# Patient Record
Sex: FEMALE | Race: WHITE | NOT HISPANIC OR LATINO | URBAN - METROPOLITAN AREA
[De-identification: names, ages, dates, MRNs, and addresses within clinical notes are randomized per-mention and may not be internally consistent; named-entity substitution may affect disease eponyms.]

---

## 2017-01-14 ENCOUNTER — EMERGENCY (EMERGENCY)
Facility: HOSPITAL | Age: 82
LOS: 1 days | Discharge: HOME | End: 2017-01-14
Attending: EMERGENCY MEDICINE
Payer: MEDICARE

## 2017-01-14 VITALS
TEMPERATURE: 98 F | OXYGEN SATURATION: 99 % | HEART RATE: 85 BPM | WEIGHT: 164.02 LBS | DIASTOLIC BLOOD PRESSURE: 77 MMHG | RESPIRATION RATE: 16 BRPM | HEIGHT: 63 IN | SYSTOLIC BLOOD PRESSURE: 115 MMHG

## 2017-01-14 DIAGNOSIS — L50.9 URTICARIA, UNSPECIFIED: ICD-10-CM
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
## 2017-01-14 PROCEDURE — 99283 EMERGENCY DEPT VISIT LOW MDM: CPT

## 2017-01-14 RX ADMIN — Medication 40 MILLIGRAM(S): at 10:30

## 2017-01-14 NOTE — ED ADULT NURSE NOTE - OBJECTIVE STATEMENT
Presented to ED with complaints of "allergic reaction that happened 2 weeks ago." AA&Ox3. Breathing on room air. Noted with hives, redness and itching to neck and abdomen. Verbalized she "does not know what triggered allergy." Seen by MD.

## 2017-01-14 NOTE — ED PROVIDER NOTE - NS ED MD SCRIBE ATTENDING SCRIBE SECTIONS
VITAL SIGNS( Pullset)/PHYSICAL EXAM/DISPOSITION/REVIEW OF SYSTEMS/HIV/PAST MEDICAL/SURGICAL/SOCIAL HISTORY/HISTORY OF PRESENT ILLNESS

## 2017-01-14 NOTE — ED PROVIDER NOTE - CHPI ED SYMPTOMS NEG
no nausea/no throat itching/no shortness of breath/no difficulty breathing/no difficulty swallowing/no vomiting

## 2017-01-14 NOTE — ED PROVIDER NOTE - OBJECTIVE STATEMENT
82 y/o female with no PMHx presents to the ED for rash x 2 weeks. Pt reports itchy diffuse rash x 2 weeks, moves around. Pt was initially given bactrim, hydroxyzine and loratadine by PMD without improvement. Pt denies throat itchiness, nausea, vomiting, new exposures, allergies, or any other complaints.

## 2017-01-14 NOTE — ED PROVIDER NOTE - MEDICAL DECISION MAKING DETAILS
Pt with urticarial rash. Pt instructed to stop Abx and continue with anti-histamine treatment. Will provide short course of steroids and have pt f/u with PMD in 1-2 days and allergy specialist in 1 week.

## 2017-05-09 ENCOUNTER — INPATIENT (INPATIENT)
Facility: HOSPITAL | Age: 82
LOS: 1 days | Discharge: HOME HEALTH | End: 2017-05-11
Attending: HOSPITALIST | Admitting: HOSPITALIST
Payer: MEDICARE

## 2017-05-09 VITALS
OXYGEN SATURATION: 99 % | DIASTOLIC BLOOD PRESSURE: 61 MMHG | HEART RATE: 72 BPM | SYSTOLIC BLOOD PRESSURE: 131 MMHG | TEMPERATURE: 98 F | RESPIRATION RATE: 16 BRPM

## 2017-05-09 DIAGNOSIS — F03.91 UNSPECIFIED DEMENTIA WITH BEHAVIORAL DISTURBANCE: ICD-10-CM

## 2017-05-09 LAB
ALBUMIN SERPL ELPH-MCNC: 4.3 G/DL — SIGNIFICANT CHANGE UP (ref 3.3–5)
ALP SERPL-CCNC: 66 U/L — SIGNIFICANT CHANGE UP (ref 40–120)
ALT FLD-CCNC: 10 U/L — SIGNIFICANT CHANGE UP (ref 4–33)
APPEARANCE UR: CLEAR — SIGNIFICANT CHANGE UP
AST SERPL-CCNC: 17 U/L — SIGNIFICANT CHANGE UP (ref 4–32)
BASOPHILS # BLD AUTO: 0.09 K/UL — SIGNIFICANT CHANGE UP (ref 0–0.2)
BASOPHILS NFR BLD AUTO: 1 % — SIGNIFICANT CHANGE UP (ref 0–2)
BILIRUB SERPL-MCNC: 0.8 MG/DL — SIGNIFICANT CHANGE UP (ref 0.2–1.2)
BILIRUB UR-MCNC: NEGATIVE — SIGNIFICANT CHANGE UP
BLOOD UR QL VISUAL: NEGATIVE — SIGNIFICANT CHANGE UP
BUN SERPL-MCNC: 18 MG/DL — SIGNIFICANT CHANGE UP (ref 7–23)
CALCIUM SERPL-MCNC: 9.5 MG/DL — SIGNIFICANT CHANGE UP (ref 8.4–10.5)
CHLORIDE SERPL-SCNC: 102 MMOL/L — SIGNIFICANT CHANGE UP (ref 98–107)
CO2 SERPL-SCNC: 23 MMOL/L — SIGNIFICANT CHANGE UP (ref 22–31)
COLOR SPEC: YELLOW — SIGNIFICANT CHANGE UP
CREAT SERPL-MCNC: 0.85 MG/DL — SIGNIFICANT CHANGE UP (ref 0.5–1.3)
EOSINOPHIL # BLD AUTO: 0.45 K/UL — SIGNIFICANT CHANGE UP (ref 0–0.5)
EOSINOPHIL NFR BLD AUTO: 5.2 % — SIGNIFICANT CHANGE UP (ref 0–6)
GLUCOSE SERPL-MCNC: 106 MG/DL — HIGH (ref 70–99)
GLUCOSE UR-MCNC: NEGATIVE — SIGNIFICANT CHANGE UP
HCT VFR BLD CALC: 42.4 % — SIGNIFICANT CHANGE UP (ref 34.5–45)
HGB BLD-MCNC: 13.2 G/DL — SIGNIFICANT CHANGE UP (ref 11.5–15.5)
IMM GRANULOCYTES NFR BLD AUTO: 0.1 % — SIGNIFICANT CHANGE UP (ref 0–1.5)
KETONES UR-MCNC: NEGATIVE — SIGNIFICANT CHANGE UP
LEUKOCYTE ESTERASE UR-ACNC: HIGH
LYMPHOCYTES # BLD AUTO: 3.82 K/UL — HIGH (ref 1–3.3)
LYMPHOCYTES # BLD AUTO: 43.9 % — SIGNIFICANT CHANGE UP (ref 13–44)
MAGNESIUM SERPL-MCNC: 3.7 MG/DL — HIGH (ref 1.6–2.6)
MCHC RBC-ENTMCNC: 26.3 PG — LOW (ref 27–34)
MCHC RBC-ENTMCNC: 31.1 % — LOW (ref 32–36)
MCV RBC AUTO: 84.5 FL — SIGNIFICANT CHANGE UP (ref 80–100)
MONOCYTES # BLD AUTO: 0.55 K/UL — SIGNIFICANT CHANGE UP (ref 0–0.9)
MONOCYTES NFR BLD AUTO: 6.3 % — SIGNIFICANT CHANGE UP (ref 2–14)
MUCOUS THREADS # UR AUTO: SIGNIFICANT CHANGE UP
NEUTROPHILS # BLD AUTO: 3.78 K/UL — SIGNIFICANT CHANGE UP (ref 1.8–7.4)
NEUTROPHILS NFR BLD AUTO: 43.5 % — SIGNIFICANT CHANGE UP (ref 43–77)
NITRITE UR-MCNC: NEGATIVE — SIGNIFICANT CHANGE UP
NON-SQ EPI CELLS # UR AUTO: <1 — SIGNIFICANT CHANGE UP
PH UR: 6 — SIGNIFICANT CHANGE UP (ref 4.6–8)
PLATELET # BLD AUTO: 289 K/UL — SIGNIFICANT CHANGE UP (ref 150–400)
PMV BLD: 10.7 FL — SIGNIFICANT CHANGE UP (ref 7–13)
POTASSIUM SERPL-MCNC: 3.9 MMOL/L — SIGNIFICANT CHANGE UP (ref 3.5–5.3)
POTASSIUM SERPL-SCNC: 3.9 MMOL/L — SIGNIFICANT CHANGE UP (ref 3.5–5.3)
PROT SERPL-MCNC: 7.9 G/DL — SIGNIFICANT CHANGE UP (ref 6–8.3)
PROT UR-MCNC: 20 — SIGNIFICANT CHANGE UP
RBC # BLD: 5.02 M/UL — SIGNIFICANT CHANGE UP (ref 3.8–5.2)
RBC # FLD: 13.9 % — SIGNIFICANT CHANGE UP (ref 10.3–14.5)
RBC CASTS # UR COMP ASSIST: SIGNIFICANT CHANGE UP (ref 0–?)
SODIUM SERPL-SCNC: 142 MMOL/L — SIGNIFICANT CHANGE UP (ref 135–145)
SP GR SPEC: 1.02 — SIGNIFICANT CHANGE UP (ref 1–1.03)
SQUAMOUS # UR AUTO: SIGNIFICANT CHANGE UP
TSH SERPL-MCNC: 1.6 UIU/ML — SIGNIFICANT CHANGE UP (ref 0.27–4.2)
UROBILINOGEN FLD QL: NORMAL E.U. — SIGNIFICANT CHANGE UP (ref 0.1–0.2)
WBC # BLD: 8.7 K/UL — SIGNIFICANT CHANGE UP (ref 3.8–10.5)
WBC # FLD AUTO: 8.7 K/UL — SIGNIFICANT CHANGE UP (ref 3.8–10.5)
WBC UR QL: HIGH (ref 0–?)

## 2017-05-09 PROCEDURE — 99223 1ST HOSP IP/OBS HIGH 75: CPT

## 2017-05-09 PROCEDURE — 70450 CT HEAD/BRAIN W/O DYE: CPT | Mod: 26

## 2017-05-09 RX ORDER — HALOPERIDOL DECANOATE 100 MG/ML
2.5 INJECTION INTRAMUSCULAR ONCE
Qty: 0 | Refills: 0 | Status: COMPLETED | OUTPATIENT
Start: 2017-05-09 | End: 2017-05-09

## 2017-05-09 RX ADMIN — Medication 1 MILLIGRAM(S): at 19:20

## 2017-05-09 RX ADMIN — HALOPERIDOL DECANOATE 2.5 MILLIGRAM(S): 100 INJECTION INTRAMUSCULAR at 19:20

## 2017-05-09 NOTE — ED ADULT NURSE NOTE - OBJECTIVE STATEMENT
Pt received in spot 25. Alert and oriented x1. Ambulatory. Brought in by family for ams. Hx of dementia, as per family pt's dementia is worsening. Pt wandering the halls in apartment building and becomes agitate at times. Meds given as per MD orders. Skin intact.  Labs sent. IV placed. VSS.

## 2017-05-09 NOTE — ED PROVIDER NOTE - CHIEF COMPLAINT
The patient is a 81y Female complaining of The patient is a 81y Female bib family for agitation, worsening behavior.

## 2017-05-09 NOTE — ED PROVIDER NOTE - MEDICAL DECISION MAKING DETAILS
80 yo F, bib 4 daughters for evaluation of worsening dementia.  Been wandering off from house. Being found by neighbors wandering the streets.  Patient is risk to self and they cannot care for her any longer at home.  They are at wits end and want her admitted for placement.  Patient is not cooperative and attempted to elope several times, but with the help of family 82 yo F, bib 4 daughters for evaluation of worsening dementia.  Been wandering off from house. Being found by neighbors wandering the streets.  Patient is risk to self and they cannot care for her any longer at home.  They are at wits end and want her admitted for placement.  Patient is not cooperative and attempted to elope several times, but with the help of family able to get back into room.  Required 2.5mg Haldol + 1mg Ativan IM.  Physical exam unremarkable.  NCAT. PERRL. EOMI. Neck supple. CTA B.  Abd:  S/ND/NT.  Extremities:  no edema.  Neuro:  ambulates w/o difficulty.  Impression:  likely dementia with behavior disturbance.  Plan:  r/o delirium with cbc, cmp, ct head, ua.  will need pharmacologic restraint to initiate workup; will administer 2.5mg haldol/1mg ativan IM.

## 2017-05-09 NOTE — H&P ADULT. - ASSESSMENT
81 year old female, with PH significant do Hypertension, Dementia and Rash, presented to the ED secondary to agitation and worsening behavior.  Vital signs upon ED presentation as follows: BP = 131/61, JR = 72, RR = 16, T = 36.7C, (98F), Ow St = 97% on RA.  Diagnosed with Dementia with Behavioral Disturbance, Unspecified, and Prolonged QT Interval.  Admitted for further management of 81 year old female, with PH significant do Hypertension, Dementia and Rash, presented to the ED secondary to agitation and worsening behavior.  Vital signs upon ED presentation as follows: BP = 131/61, HR = 72, RR = 16, T = 36.7C, (98F), Ow St = 97% on RA.  Diagnosed with Dementia with Behavioral Disturbance, Unspecified, and Prolonged QT Interval.  Admitted for further management of 81 year old female, with PH significant do Hypertension, Dementia and Urticaria, presented to the ED secondary to agitation and worsening behavior.  Vital signs upon ED presentation as follows: BP = 131/61, HR = 72, RR = 16, T = 36.7C, (98F), Ow St = 97% on RA.  Diagnosed with Dementia with Behavioral Disturbance, Unspecified, and Prolonged QT Interval.  Admitted for further management of Prolonged QT interval, Dementia with behavioral disturbance...

## 2017-05-09 NOTE — ED PROVIDER NOTE - PROGRESS NOTE DETAILS
PCP = Dr. Leslie Granado MD/DO.  No answering service.  Does not admit to [facility].  [phone] PCP = Dr. Leslie Granado MD/DO.  No answering service.  Does not admit to [facility].  [phone]  ECG demosntrated QTc 512; possibly medication induced; will admit on Tele, and check magnesium Per family, was told by PMD that ECG is "abnormal" but unclear exactly what this is.  Takes metoprolol for HTN, but does not take regularly.

## 2017-05-09 NOTE — H&P ADULT. - PROBLEM SELECTOR PLAN 1
- asymptomatic  - previous tracing of ECG without QT prolongation  - QTc currently 515  - s/p Haloperidol 2.5 mg IM in the ED  - f/u repeat ECG in the AM

## 2017-05-09 NOTE — H&P ADULT. - HISTORY OF PRESENT ILLNESS
89 year old female, with PH significant do Hypertension, Dementia and Rash, presented to the ED secondary to agitation and worsening behavior.  Seen and evaluate at bedside;    Vital signs upon ED presentation as follows: BP = 131/61, JR = 72, RR = 16, T = 36.7C, (98F), Ow St = 97% on RA.  Diagnosed with Dementia with Behavioral Disturbance, Unspecified, and Prolonged QT Interval.  Prescribed Haloperidol 2.5 mg IM x one and Lorazepam 1 mg IM in the ED. 81 year old female, with PH significant do Hypertension, Dementia and Rash, presented to the ED secondary to agitation and worsening behavior.  Seen and evaluated at bedside with son present; asleep, and repositions self during interview, but does not fully awaken.  Son, who is Albanian speaking, defers history to his wife, Carine, via telephone.  Per reports, patient has become increasingly agitated and uncooperative, especially during the past few days.  Three days ago, patient stopped taking her prescribed medications and verbalizes that she is well and that she wants to live alone.  Patient lives with her son, daughter-in-law and granddaughter, but prefers to be with her daughter who is only able to be with her for some hours during the days.  Since stopping her medications, patient frequently wanders outside the house, even onto the highway, and is not able to be managed by the A.  Unable to be convinced even to take showers.  Family has tried dissolving the medications in water, but patient refuses same.  Patient sleeps at nights, but gets up at 5:00 AM daily and begins banging on the neighbor's doors; consequently, the neighbors have been complaining.  Notedly, patient's   just over one year ago, and patient's signs/symptoms have worsened since then.  No reports of fevers, chills, SOB, chest pain, nausea, vomiting, dysuria.    Vital signs upon ED presentation as follows: BP = 131/61, JR = 72, RR = 16, T = 36.7C, (98F), Ow St = 97% on RA.  Diagnosed with Dementia with Behavioral Disturbance, Unspecified, and Prolonged QT Interval.  Prescribed Haloperidol 2.5 mg IM x one and Lorazepam 1 mg IM in the ED. 81 year old female, with PH significant do Hypertension, Dementia and Rash, presented to the ED secondary to agitation and worsening behavior.  Seen and evaluated at bedside with son present; asleep, and repositions self during interview, but does not fully awaken.  Son, who is Vietnamese speaking, defers history to his wife, Carine, via telephone.  Per reports, patient has become increasingly agitated and uncooperative, especially during the past few days.  Three days ago, patient stopped taking her prescribed medications and verbalizes that she is well and that she wants to live alone.  Patient lives with her son, daughter-in-law and granddaughter, but prefers to be with her daughter who is only able to be with her for some hours during the days.  Since stopping her medications, patient frequently wanders outside the house, even onto the highway, and is not able to be managed by the A.  Unable to be convinced even to take showers.  Family has tried dissolving the medications in water, but patient refuses same.  Patient sleeps at nights, but gets up at 5:00 AM daily and begins banging on the neighbor's doors; consequently, the neighbors have been complaining.  Notedly, patient's   just over one year ago, and patient's signs/symptoms have worsened since then.  No reports of fevers, chills, SOB, chest pain, nausea, vomiting, dysuria.    Vital signs upon ED presentation as follows: BP = 131/61, JR = 72, RR = 16, T = 36.7C, (98F), Ow St = 97% on RA.  CT scan of head negative for any acute pathology.  Diagnosed with Dementia with Behavioral Disturbance, Unspecified, and Prolonged QT Interval.  Prescribed Haloperidol 2.5 mg IM x one and Lorazepam 1 mg IM in the ED. 81 year old female, with PH significant do Hypertension, Dementia and Rash, presented to the ED secondary to agitation and worsening behavior.  Seen and evaluated at bedside with son present; asleep, and repositions self during interview, but does not fully awaken.  Son, who is Portuguese speaking, defers history to his wife, Carine, via telephone.  Per reports, patient has become increasingly agitated and uncooperative, especially during the past few days.  Three days ago, patient stopped taking her prescribed medications and verbalizes that she is well and that she wants to live alone.  Patient lives with her son, daughter-in-law and granddaughter, but prefers to be with her daughter who is only able to be with her for some hours during the days.  Since stopping her medications, patient frequently wanders outside the house, even onto the highway, and is not able to be managed by the A.  Unable to be convinced even to take showers.  Family has tried dissolving the medications in water, but patient refuses same.  Patient sleeps at nights, but gets up at 5:00 AM daily and begins banging on the neighbor's doors; consequently, the neighbors have been complaining.  Notedly, patient's   just over one year ago, and patient's signs/symptoms have worsened since then.  No reports of fevers, chills, SOB, chest pain, nausea, vomiting, dysuria.    Vital signs upon ED presentation as follows: BP = 131/61, JR = 72, RR = 16, T = 36.7C, (98F), O2 Sat = 97% on RA.  CT scan of head negative for any acute pathology.  Diagnosed with Dementia with Behavioral Disturbance, Unspecified, and Prolonged QT Interval.  Prescribed Haloperidol 2.5 mg IM x one and Lorazepam 1 mg IM in the ED.

## 2017-05-09 NOTE — ED PROVIDER NOTE - PMH
<<----- Click to add NO pertinent Past Medical History No pertinent past medical history Essential hypertension

## 2017-05-09 NOTE — ED PROVIDER NOTE - OBJECTIVE STATEMENT
80 yo F, bib 4 daughters for evaluation of worsening dementia.  Been wandering off from house. Being found by neighbors wandering the streets.  Patient is risk to self and they cannot care for her any longer at home.  They are at wits end and want her admitted for placement.  Patient is not cooperative and attempted to elope several times, but with the help of family able to get her into room for evaluation.

## 2017-05-09 NOTE — ED PROVIDER NOTE - CARE PLAN
Principal Discharge DX:	Dementia with behavioral disturbance, unspecified dementia type Principal Discharge DX:	Dementia with behavioral disturbance, unspecified dementia type  Secondary Diagnosis:	Prolonged QT interval

## 2017-05-09 NOTE — H&P ADULT. - PROBLEM SELECTOR PLAN 2
- worsening, especially during the past few days  - change in behavior noted since   > 1 year ago  - wandering, risk for elopement  - currently calm and asleep - s/p medication in the ED (Haloperidol and Ativan)  - worsening signs/symptoms due to medication non-compliance; patient refusing to take medications, especially over the past 3 days  - previously on Depakote, Namenda, Zoloft, Seroquel (needs to be verified for reconciliation - [facility] [place] [facility] [place])  - on Enhanced supervision for now; may need Constant Observation if patient again becomes agitated  - TSH within acceptable range (1.60)  - f/u AM lab-work  - family does not want NH placement at this time; prefers patient to be at home  - Psychiatry consult in the AM (please call) - worsening, especially during the past few days (anxiety, agitation)  - change in behavior noted since   > 1 year ago  - most likely compounded by depression  - wandering, risk for elopement  - currently calm and asleep - s/p medication in the ED (Haloperidol and Ativan)  - worsening signs/symptoms due to medication non-compliance; patient refusing to take medications, especially over the past 3 days  - previously on Depakote (s/e of stomach upset, per family), Namenda, Zoloft, Seroquel (needs to be verified for reconciliation - [facility] - [place])  - on Enhanced supervision for now; may need Constant Observation if patient again becomes agitated  - TSH within acceptable range (1.60)  - f/u AM lab-work - including vit D, vit B-1, vit B-12 levels  - family does not want NH placement at this time; prefers patient to be at home  - Psychiatry consult in the AM (please call)

## 2017-05-09 NOTE — ED PROVIDER NOTE - CONSTITUTIONAL, MLM
normal... Well appearing, well nourished, awake, alert, oriented to person, and in mild distress.  Agitated that she is in ED

## 2017-05-09 NOTE — ED ADULT TRIAGE NOTE - CHIEF COMPLAINT QUOTE
As per the patients daughter, Her mom has Dementia and has not been taking her meds she is more confused and pacing the hallways in her apartment building, she was sent by her PMD.

## 2017-05-09 NOTE — H&P ADULT. - PMH
Dementia with behavioral disturbance, unspecified dementia type    Essential hypertension    Urticaria

## 2017-05-10 VITALS
HEART RATE: 70 BPM | DIASTOLIC BLOOD PRESSURE: 87 MMHG | RESPIRATION RATE: 18 BRPM | OXYGEN SATURATION: 97 % | SYSTOLIC BLOOD PRESSURE: 143 MMHG | TEMPERATURE: 98 F

## 2017-05-10 DIAGNOSIS — F03.91 UNSPECIFIED DEMENTIA WITH BEHAVIORAL DISTURBANCE: ICD-10-CM

## 2017-05-10 DIAGNOSIS — R63.8 OTHER SYMPTOMS AND SIGNS CONCERNING FOOD AND FLUID INTAKE: ICD-10-CM

## 2017-05-10 DIAGNOSIS — Z29.9 ENCOUNTER FOR PROPHYLACTIC MEASURES, UNSPECIFIED: ICD-10-CM

## 2017-05-10 DIAGNOSIS — R94.31 ABNORMAL ELECTROCARDIOGRAM [ECG] [EKG]: ICD-10-CM

## 2017-05-10 LAB
BUN SERPL-MCNC: 12 MG/DL — SIGNIFICANT CHANGE UP (ref 7–23)
CALCIUM SERPL-MCNC: 9.4 MG/DL — SIGNIFICANT CHANGE UP (ref 8.4–10.5)
CHLORIDE SERPL-SCNC: 104 MMOL/L — SIGNIFICANT CHANGE UP (ref 98–107)
CK SERPL-CCNC: 190 U/L — HIGH (ref 25–170)
CO2 SERPL-SCNC: 27 MMOL/L — SIGNIFICANT CHANGE UP (ref 22–31)
CREAT SERPL-MCNC: 0.69 MG/DL — SIGNIFICANT CHANGE UP (ref 0.5–1.3)
GLUCOSE SERPL-MCNC: 88 MG/DL — SIGNIFICANT CHANGE UP (ref 70–99)
HBA1C BLD-MCNC: 6.6 % — HIGH (ref 4–5.6)
MAGNESIUM SERPL-MCNC: 2.3 MG/DL — SIGNIFICANT CHANGE UP (ref 1.6–2.6)
PHOSPHATE SERPL-MCNC: 4 MG/DL — SIGNIFICANT CHANGE UP (ref 2.5–4.5)
POTASSIUM SERPL-MCNC: 3.9 MMOL/L — SIGNIFICANT CHANGE UP (ref 3.5–5.3)
POTASSIUM SERPL-SCNC: 3.9 MMOL/L — SIGNIFICANT CHANGE UP (ref 3.5–5.3)
SODIUM SERPL-SCNC: 144 MMOL/L — SIGNIFICANT CHANGE UP (ref 135–145)
VIT B12 SERPL-MCNC: 825 PG/ML — SIGNIFICANT CHANGE UP (ref 200–900)

## 2017-05-10 PROCEDURE — 90792 PSYCH DIAG EVAL W/MED SRVCS: CPT

## 2017-05-10 PROCEDURE — 99233 SBSQ HOSP IP/OBS HIGH 50: CPT

## 2017-05-10 RX ORDER — DEXTROSE 50 % IN WATER 50 %
25 SYRINGE (ML) INTRAVENOUS ONCE
Qty: 0 | Refills: 0 | Status: DISCONTINUED | OUTPATIENT
Start: 2017-05-10 | End: 2017-05-11

## 2017-05-10 RX ORDER — PANTOPRAZOLE SODIUM 20 MG/1
40 TABLET, DELAYED RELEASE ORAL
Qty: 0 | Refills: 0 | Status: DISCONTINUED | OUTPATIENT
Start: 2017-05-10 | End: 2017-05-11

## 2017-05-10 RX ORDER — GLUCAGON INJECTION, SOLUTION 0.5 MG/.1ML
1 INJECTION, SOLUTION SUBCUTANEOUS ONCE
Qty: 0 | Refills: 0 | Status: DISCONTINUED | OUTPATIENT
Start: 2017-05-10 | End: 2017-05-11

## 2017-05-10 RX ORDER — DIVALPROEX SODIUM 500 MG/1
250 TABLET, DELAYED RELEASE ORAL DAILY
Qty: 0 | Refills: 0 | Status: DISCONTINUED | OUTPATIENT
Start: 2017-05-10 | End: 2017-05-10

## 2017-05-10 RX ORDER — DIVALPROEX SODIUM 500 MG/1
0 TABLET, DELAYED RELEASE ORAL
Qty: 0 | Refills: 0 | COMMUNITY

## 2017-05-10 RX ORDER — CLONAZEPAM 1 MG
1 TABLET ORAL
Qty: 0 | Refills: 0 | COMMUNITY

## 2017-05-10 RX ORDER — QUETIAPINE FUMARATE 200 MG/1
200 TABLET, FILM COATED ORAL DAILY
Qty: 0 | Refills: 0 | Status: DISCONTINUED | OUTPATIENT
Start: 2017-05-10 | End: 2017-05-10

## 2017-05-10 RX ORDER — INSULIN LISPRO 100/ML
VIAL (ML) SUBCUTANEOUS AT BEDTIME
Qty: 0 | Refills: 0 | Status: DISCONTINUED | OUTPATIENT
Start: 2017-05-10 | End: 2017-05-11

## 2017-05-10 RX ORDER — QUETIAPINE FUMARATE 200 MG/1
1 TABLET, FILM COATED ORAL
Qty: 0 | Refills: 0 | COMMUNITY

## 2017-05-10 RX ORDER — MEMANTINE HYDROCHLORIDE 10 MG/1
10 TABLET ORAL
Qty: 0 | Refills: 0 | Status: DISCONTINUED | OUTPATIENT
Start: 2017-05-10 | End: 2017-05-11

## 2017-05-10 RX ORDER — QUETIAPINE FUMARATE 200 MG/1
25 TABLET, FILM COATED ORAL EVERY 12 HOURS
Qty: 0 | Refills: 0 | Status: DISCONTINUED | OUTPATIENT
Start: 2017-05-10 | End: 2017-05-10

## 2017-05-10 RX ORDER — LEVOCETIRIZINE DIHYDROCHLORIDE 0.5 MG/ML
1 SOLUTION ORAL
Qty: 0 | Refills: 0 | COMMUNITY

## 2017-05-10 RX ORDER — DIVALPROEX SODIUM 500 MG/1
250 TABLET, DELAYED RELEASE ORAL
Qty: 0 | Refills: 0 | Status: DISCONTINUED | OUTPATIENT
Start: 2017-05-11 | End: 2017-05-11

## 2017-05-10 RX ORDER — SERTRALINE 25 MG/1
50 TABLET, FILM COATED ORAL DAILY
Qty: 0 | Refills: 0 | Status: DISCONTINUED | OUTPATIENT
Start: 2017-05-10 | End: 2017-05-11

## 2017-05-10 RX ORDER — SODIUM CHLORIDE 9 MG/ML
1000 INJECTION, SOLUTION INTRAVENOUS
Qty: 0 | Refills: 0 | Status: DISCONTINUED | OUTPATIENT
Start: 2017-05-10 | End: 2017-05-11

## 2017-05-10 RX ORDER — CLONAZEPAM 1 MG
0.5 TABLET ORAL DAILY
Qty: 0 | Refills: 0 | Status: DISCONTINUED | OUTPATIENT
Start: 2017-05-10 | End: 2017-05-11

## 2017-05-10 RX ORDER — INSULIN LISPRO 100/ML
VIAL (ML) SUBCUTANEOUS
Qty: 0 | Refills: 0 | Status: DISCONTINUED | OUTPATIENT
Start: 2017-05-10 | End: 2017-05-11

## 2017-05-10 RX ORDER — LORATADINE 10 MG/1
10 TABLET ORAL DAILY
Qty: 0 | Refills: 0 | Status: DISCONTINUED | OUTPATIENT
Start: 2017-05-10 | End: 2017-05-11

## 2017-05-10 RX ORDER — MEMANTINE HYDROCHLORIDE 10 MG/1
5 TABLET ORAL DAILY
Qty: 0 | Refills: 0 | Status: DISCONTINUED | OUTPATIENT
Start: 2017-05-10 | End: 2017-05-10

## 2017-05-10 RX ORDER — QUETIAPINE FUMARATE 200 MG/1
0 TABLET, FILM COATED ORAL
Qty: 0 | Refills: 0 | COMMUNITY

## 2017-05-10 RX ORDER — MEMANTINE HYDROCHLORIDE 10 MG/1
0 TABLET ORAL
Qty: 0 | Refills: 0 | COMMUNITY

## 2017-05-10 RX ORDER — SERTRALINE 25 MG/1
0 TABLET, FILM COATED ORAL
Qty: 0 | Refills: 0 | COMMUNITY

## 2017-05-10 RX ORDER — ENOXAPARIN SODIUM 100 MG/ML
40 INJECTION SUBCUTANEOUS EVERY 24 HOURS
Qty: 0 | Refills: 0 | Status: DISCONTINUED | OUTPATIENT
Start: 2017-05-10 | End: 2017-05-11

## 2017-05-10 RX ORDER — DEXTROSE 50 % IN WATER 50 %
1 SYRINGE (ML) INTRAVENOUS ONCE
Qty: 0 | Refills: 0 | Status: DISCONTINUED | OUTPATIENT
Start: 2017-05-10 | End: 2017-05-11

## 2017-05-10 RX ORDER — DEXTROSE 50 % IN WATER 50 %
12.5 SYRINGE (ML) INTRAVENOUS ONCE
Qty: 0 | Refills: 0 | Status: DISCONTINUED | OUTPATIENT
Start: 2017-05-10 | End: 2017-05-11

## 2017-05-10 RX ORDER — LORATADINE 10 MG/1
1 TABLET ORAL
Qty: 0 | Refills: 0 | COMMUNITY
Start: 2017-05-10

## 2017-05-10 RX ADMIN — Medication 0.5 MILLIGRAM(S): at 21:25

## 2017-05-10 RX ADMIN — Medication 1 MILLIGRAM(S): at 22:02

## 2017-05-10 RX ADMIN — QUETIAPINE FUMARATE 25 MILLIGRAM(S): 200 TABLET, FILM COATED ORAL at 06:39

## 2017-05-10 NOTE — DISCHARGE NOTE ADULT - MEDICATION SUMMARY - MEDICATIONS TO TAKE
I will START or STAY ON the medications listed below when I get home from the hospital:    Depakote  mg oral tablet, extended release  -- 1 tab(s) by mouth once a day  -- Indication: For Dementia with behavioral disturbance    clonazePAM 0.5 mg oral tablet  -- 1 tab(s) by mouth once a day, As Needed  -- Indication: For Dementia with behavioral disturbance    Zoloft 50 mg oral tablet  -- 1 tab(s) by mouth once a day  -- Indication: For Dementia with behavioral disturbance    metFORMIN 500 mg oral tablet, extended release  -- 1 tab(s) by mouth once a day  -- Indication: For Diabetes    loratadine 10 mg oral tablet  -- 1 tab(s) by mouth once a day  -- Indication: For allergies    Namenda 10 mg oral tablet  -- 1 tab(s) by mouth 2 times a day  -- Indication: For Dementia with behavioral disturbance    omeprazole 20 mg oral delayed release tablet  -- 1 tab(s) by mouth once a day  -- Indication: For GERD

## 2017-05-10 NOTE — DISCHARGE NOTE ADULT - CARE PROVIDER_API CALL
Maxx Aguilar; BM; PhD), Internal Medicine  [address] [place] [address]  Phone: [phone]  Fax: [phone]    Valentina Burns), Psychiatry; Psychosomatic Medicine  [address] [place] [id number]  Phone: [phone]  Fax: [phone]

## 2017-05-10 NOTE — DISCHARGE NOTE ADULT - CARE PLAN
Principal Discharge DX:	Dementia with behavioral disturbance, unspecified dementia type  Goal:	prevent worsening of disease  Instructions for follow-up, activity and diet:	follow up with psychiatrist in 3 days. please continue taking medications as prescribed.  Secondary Diagnosis:	Prolonged QT interval  Instructions for follow-up, activity and diet:	follow up with cardiologist in 1 week to monitor EKGs.

## 2017-05-10 NOTE — PROVIDER CONTACT NOTE (OTHER) - ASSESSMENT
Pt tele monitor off, refused to keep leads on despite multiple attempts. Family decided they want to take pt home today, asking how long it will take to discharge pt.

## 2017-05-10 NOTE — DISCHARGE NOTE ADULT - HOSPITAL COURSE
80 y/o female, with a PmHx of HTN, Dementia and Urticaria, presented to the ED secondary to agitation and worsening behavior. Diagnosed with Dementia with Behavioral Disturbance, Unspecified, and Prolonged QT Interval.  Admitted for further management of Prolonged QT interval, Dementia with behavioral disturbance.    Patient found to have dementia w/behavioral disturbance placed on enhanced supervision. Patient found to have prolonged QT - being monitored on telemetry. Seroquel discontinued due to prolong QTC.   EKG shows sinus bradycardia at 67, 1st deg AVB, LVH, , Q wave I, AVL, V2 UA  showed small leuks 80 y/o female, with a PmHx of HTN, Dementia and Urticaria, presented to the ED secondary to agitation and worsening behavior. Diagnosed with Dementia with Behavioral Disturbance, Unspecified, and Prolonged QT Interval.  Admitted for further management of Prolonged QT interval, Dementia with behavioral disturbance.    Patient found to have dementia w/behavioral disturbance placed on enhanced supervision. Patient found to have prolonged QT - being monitored on telemetry. Seroquel discontinued due to prolong QTC.   EKG shows sinus bradycardia at 67, 1st deg AVB, LVH, , Q wave I, AVL, V2 UA  showed small leuks  5/11 repeat EKG showed QTc- 468. stable for d/c home today and f/u with cardiologist and psychiatrist as an out patient. 82 y/o female, with a PmHx of HTN, Dementia and Urticaria, presented to the ED secondary to agitation and worsening behavior. Diagnosed with Dementia with Behavioral Disturbance, Unspecified, and Prolonged QT Interval.  Admitted for further management of Prolonged QT interval, Dementia with behavioral disturbance.    Patient found to have dementia w/behavioral disturbance placed on enhanced supervision. Patient found to have prolonged QT - being monitored on telemetry. Seroquel discontinued due to prolong QTC.   EKG shows sinus bradycardia at 67, 1st deg AVB, LVH, , Q wave I, AVL, V2 UA  showed small leuks  5/11 repeat EKG showed QTc- 468. stable for d/c home today and f/u with cardiologist and psychiatrist as an out patient.    5/11/17 11:00am, medical attending spoke with Dr. Nash, over phone [phone] about Pt hospital course. She agreed Pt has advanced dementia with behavior disturbance, likely Alzheimer's vs frontal-temperal dementia. She discussed with family about poor prognosis. However, if family is willing to take care of Pt at home, for pt best benefit. She will offer appointment next week to see pt. Discussed Pt current medication regimen.

## 2017-05-10 NOTE — DISCHARGE NOTE ADULT - PLAN OF CARE
prevent worsening of disease follow up with psychiatrist in 3 days. please continue taking medications as prescribed. follow up with cardiologist in 1 week to monitor EKGs.

## 2017-05-10 NOTE — PATIENT PROFILE ADULT. - LANGUAGE ASSISTANCE NEEDED
family @ bedside to interpret/No-Patient/Caregiver offered and refused free interpretation services.

## 2017-05-10 NOTE — DISCHARGE NOTE ADULT - PATIENT PORTAL LINK FT
“You can access the Follow[employer] Patient Portal, offered by [facility], by registering with the following website: [email or web address]”

## 2017-05-10 NOTE — PROVIDER CONTACT NOTE (OTHER) - BACKGROUND
80 yo F PmH HTN, dementia, urticaria, presented to ED secondary to agitation and worsening behavior. Dx with dementia with behavioral disturbance, unspecified, and prolonged QT interval.

## 2017-05-10 NOTE — PROVIDER CONTACT NOTE (OTHER) - SITUATION
Pt tele monitor off, refused to keep leads on despite multiple attempts. Family decided they want to take pt home today.

## 2017-05-10 NOTE — DISCHARGE NOTE ADULT - MEDICATION SUMMARY - MEDICATIONS TO STOP TAKING
I will STOP taking the medications listed below when I get home from the hospital:    SEROquel  --  by mouth

## 2017-05-11 LAB — 24R-OH-CALCIDIOL SERPL-MCNC: 19 NG/ML — LOW (ref 30–100)

## 2017-05-11 PROCEDURE — 99232 SBSQ HOSP IP/OBS MODERATE 35: CPT

## 2017-05-11 PROCEDURE — 99239 HOSP IP/OBS DSCHRG MGMT >30: CPT

## 2017-05-11 PROCEDURE — 93010 ELECTROCARDIOGRAM REPORT: CPT

## 2017-05-13 LAB — VIT B1 SERPL-MCNC: 139 NMOL/L — SIGNIFICANT CHANGE UP (ref 66.5–200)

## 2017-11-11 ENCOUNTER — EMERGENCY (EMERGENCY)
Facility: HOSPITAL | Age: 82
LOS: 1 days | Discharge: HOME | End: 2017-11-11
Attending: EMERGENCY MEDICINE
Payer: MEDICARE

## 2017-11-11 VITALS
HEIGHT: 65 IN | HEART RATE: 82 BPM | WEIGHT: 128.09 LBS | TEMPERATURE: 98 F | RESPIRATION RATE: 16 BRPM | DIASTOLIC BLOOD PRESSURE: 75 MMHG | SYSTOLIC BLOOD PRESSURE: 138 MMHG | OXYGEN SATURATION: 100 %

## 2017-11-11 DIAGNOSIS — K51.30 ULCERATIVE (CHRONIC) RECTOSIGMOIDITIS WITHOUT COMPLICATIONS: ICD-10-CM

## 2017-11-11 DIAGNOSIS — E86.0 DEHYDRATION: ICD-10-CM
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
## 2017-11-11 DIAGNOSIS — I10 ESSENTIAL (PRIMARY) HYPERTENSION: ICD-10-CM

## 2017-11-11 DIAGNOSIS — J32.9 CHRONIC SINUSITIS, UNSPECIFIED: ICD-10-CM
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
## 2017-11-11 LAB
ALBUMIN SERPL ELPH-MCNC: 3.4 G/DL — LOW (ref 3.5–5)
ALP SERPL-CCNC: 60 U/L — SIGNIFICANT CHANGE UP (ref 40–120)
ALT FLD-CCNC: 30 U/L DA — SIGNIFICANT CHANGE UP (ref 10–60)
ANION GAP SERPL CALC-SCNC: 3 MMOL/L — LOW (ref 5–17)
APPEARANCE UR: CLEAR — SIGNIFICANT CHANGE UP
AST SERPL-CCNC: 27 U/L — SIGNIFICANT CHANGE UP (ref 10–40)
BASOPHILS # BLD AUTO: 0.1 K/UL — SIGNIFICANT CHANGE UP (ref 0–0.2)
BASOPHILS NFR BLD AUTO: 0.8 % — SIGNIFICANT CHANGE UP (ref 0–2)
BILIRUB SERPL-MCNC: 0.7 MG/DL — SIGNIFICANT CHANGE UP (ref 0.2–1.2)
BILIRUB UR-MCNC: NEGATIVE — SIGNIFICANT CHANGE UP
BUN SERPL-MCNC: 12 MG/DL — SIGNIFICANT CHANGE UP (ref 7–18)
CALCIUM SERPL-MCNC: 9.1 MG/DL — SIGNIFICANT CHANGE UP (ref 8.4–10.5)
CHLORIDE SERPL-SCNC: 103 MMOL/L — SIGNIFICANT CHANGE UP (ref 96–108)
CO2 SERPL-SCNC: 33 MMOL/L — HIGH (ref 22–31)
COLOR SPEC: YELLOW — SIGNIFICANT CHANGE UP
CREAT SERPL-MCNC: 0.77 MG/DL — SIGNIFICANT CHANGE UP (ref 0.5–1.3)
DIFF PNL FLD: ABNORMAL
EOSINOPHIL # BLD AUTO: 0.3 K/UL — SIGNIFICANT CHANGE UP (ref 0–0.5)
EOSINOPHIL NFR BLD AUTO: 3.3 % — SIGNIFICANT CHANGE UP (ref 0–6)
GLUCOSE SERPL-MCNC: 115 MG/DL — HIGH (ref 70–99)
GLUCOSE UR QL: NEGATIVE — SIGNIFICANT CHANGE UP
HCT VFR BLD CALC: 45 % — SIGNIFICANT CHANGE UP (ref 34.5–45)
HGB BLD-MCNC: 14.3 G/DL — SIGNIFICANT CHANGE UP (ref 11.5–15.5)
KETONES UR-MCNC: NEGATIVE — SIGNIFICANT CHANGE UP
LEUKOCYTE ESTERASE UR-ACNC: ABNORMAL
LYMPHOCYTES # BLD AUTO: 2.2 K/UL — SIGNIFICANT CHANGE UP (ref 1–3.3)
LYMPHOCYTES # BLD AUTO: 21.8 % — SIGNIFICANT CHANGE UP (ref 13–44)
MCHC RBC-ENTMCNC: 28 PG — SIGNIFICANT CHANGE UP (ref 27–34)
MCHC RBC-ENTMCNC: 31.7 GM/DL — LOW (ref 32–36)
MCV RBC AUTO: 88.1 FL — SIGNIFICANT CHANGE UP (ref 80–100)
MONOCYTES # BLD AUTO: 0.6 K/UL — SIGNIFICANT CHANGE UP (ref 0–0.9)
MONOCYTES NFR BLD AUTO: 5.6 % — SIGNIFICANT CHANGE UP (ref 2–14)
NEUTROPHILS # BLD AUTO: 6.8 K/UL — SIGNIFICANT CHANGE UP (ref 1.8–7.4)
NEUTROPHILS NFR BLD AUTO: 68.5 % — SIGNIFICANT CHANGE UP (ref 43–77)
NITRITE UR-MCNC: POSITIVE
PH UR: 7 — SIGNIFICANT CHANGE UP (ref 5–8)
PLATELET # BLD AUTO: 238 K/UL — SIGNIFICANT CHANGE UP (ref 150–400)
POTASSIUM SERPL-MCNC: 4.7 MMOL/L — SIGNIFICANT CHANGE UP (ref 3.5–5.3)
POTASSIUM SERPL-SCNC: 4.7 MMOL/L — SIGNIFICANT CHANGE UP (ref 3.5–5.3)
PROT SERPL-MCNC: 7.4 G/DL — SIGNIFICANT CHANGE UP (ref 6–8.3)
PROT UR-MCNC: 15
RBC # BLD: 5.1 M/UL — SIGNIFICANT CHANGE UP (ref 3.8–5.2)
RBC # FLD: 12.4 % — SIGNIFICANT CHANGE UP (ref 10.3–14.5)
SODIUM SERPL-SCNC: 139 MMOL/L — SIGNIFICANT CHANGE UP (ref 135–145)
SP GR SPEC: 1.01 — SIGNIFICANT CHANGE UP (ref 1.01–1.02)
UROBILINOGEN FLD QL: NEGATIVE — SIGNIFICANT CHANGE UP
WBC # BLD: 10 K/UL — SIGNIFICANT CHANGE UP (ref 3.8–10.5)
WBC # FLD AUTO: 10 K/UL — SIGNIFICANT CHANGE UP (ref 3.8–10.5)

## 2017-11-11 PROCEDURE — 70486 CT MAXILLOFACIAL W/O DYE: CPT | Mod: 26

## 2017-11-11 PROCEDURE — 99285 EMERGENCY DEPT VISIT HI MDM: CPT | Mod: GW

## 2017-11-11 PROCEDURE — 12011 RPR F/E/E/N/L/M 2.5 CM/<: CPT

## 2017-11-11 PROCEDURE — 71010: CPT | Mod: 26

## 2017-11-11 PROCEDURE — 70450 CT HEAD/BRAIN W/O DYE: CPT | Mod: 26

## 2017-11-11 PROCEDURE — 74177 CT ABD & PELVIS W/CONTRAST: CPT | Mod: 26

## 2017-11-11 RX ORDER — CEFDINIR 250 MG/5ML
1 POWDER, FOR SUSPENSION ORAL
Qty: 12 | Refills: 0 | OUTPATIENT
Start: 2017-11-11 | End: 2017-11-17

## 2017-11-11 RX ORDER — HALOPERIDOL DECANOATE 100 MG/ML
5 INJECTION INTRAMUSCULAR ONCE
Qty: 0 | Refills: 0 | Status: COMPLETED | OUTPATIENT
Start: 2017-11-11 | End: 2017-11-11

## 2017-11-11 RX ORDER — SODIUM CHLORIDE 9 MG/ML
1000 INJECTION INTRAMUSCULAR; INTRAVENOUS; SUBCUTANEOUS ONCE
Qty: 0 | Refills: 0 | Status: COMPLETED | OUTPATIENT
Start: 2017-11-11 | End: 2017-11-11

## 2017-11-11 RX ORDER — CEFTRIAXONE 500 MG/1
1 INJECTION, POWDER, FOR SOLUTION INTRAMUSCULAR; INTRAVENOUS ONCE
Qty: 0 | Refills: 0 | Status: COMPLETED | OUTPATIENT
Start: 2017-11-11 | End: 2017-11-11

## 2017-11-11 RX ORDER — FLUCONAZOLE 150 MG/1
1 TABLET ORAL
Qty: 2 | Refills: 0 | OUTPATIENT
Start: 2017-11-11 | End: 2017-11-13

## 2017-11-11 RX ADMIN — HALOPERIDOL DECANOATE 5 MILLIGRAM(S): 100 INJECTION INTRAMUSCULAR at 16:33

## 2017-11-11 RX ADMIN — Medication 80 MILLIGRAM(S): at 21:58

## 2017-11-11 RX ADMIN — CEFTRIAXONE 100 GRAM(S): 500 INJECTION, POWDER, FOR SOLUTION INTRAMUSCULAR; INTRAVENOUS at 19:56

## 2017-11-11 RX ADMIN — SODIUM CHLORIDE 1000 MILLILITER(S): 9 INJECTION INTRAMUSCULAR; INTRAVENOUS; SUBCUTANEOUS at 21:58

## 2017-11-11 RX ADMIN — Medication 2 MILLIGRAM(S): at 15:16

## 2017-11-11 RX ADMIN — Medication 2 MILLIGRAM(S): at 16:33

## 2017-11-11 NOTE — ED PROVIDER NOTE - OBJECTIVE STATEMENT
81 y/o F pt with PMHx of dementia, essential HTN, is on Hospice, brought in by family member to the ED s/p fall x today. As per daughter, home health aid was changing pt when she fell and hit her face and suffered and abrasion to the head. Daughter also noticed itchy rash to left lower extremity for the past 2 days. Limited HPI secondary to dementia

## 2017-11-11 NOTE — ED PROVIDER NOTE - MEDICAL DECISION MAKING DETAILS
will eval for diverticulitis and uti. ct head and max face. xr chest, no other findings of bony injury.

## 2017-11-11 NOTE — ED ADULT NURSE NOTE - OBJECTIVE STATEMENT
BIB EMS S/P fall at home Pt with PMH Alzheimer none verbal anxious and restless bruse to L forehead abrasion to nose and mild bleeding noted family at bed side

## 2017-11-11 NOTE — ED PROVIDER NOTE - PHYSICAL EXAMINATION
A primary and secondary survey was performed. Airway: oropharynx clear, Breathing: normal breath sounds bilaterally,, Circulation: Mentation at baseline (poor verbal but awake) pulses palpated in all 4 limbs, no obvious active external hemorrhage, lungs/abd/pelvis/legs wo signs of blood accumulation. Disability: Neuro intact / pupils equal round reactive. Exposure: No external signs of trauma EXCEPT head contusion. Spine including c-spine non-tender.     Gen: Well appearing not in distress. Head: NC,L frontal contusion. No ott sign/raccoon eyes. ENT: No hemoTM, oropharynx as above, no nasal hemorrhage nasal bridge redness and minimal lac (0.5cm). Neck: as above. Chest: Lung exam- CTAB, no ttp in chest wall. Cardiac: RRR S1S2, No JVD. Abd: soft, left lower quadrant tender. Normal in color. Pelvis: Stable. Ext: no ttp in all 4 limbs, rom at shoulder, elbow, hip and knee wnl, Skin: No Abrasions or lacerations. Neuro: GCS 15 , Moving 4 limbs, able to ambulate per daughter . Psych: mild agitation.

## 2017-11-11 NOTE — ED PROCEDURE NOTE - CPROC ED INFORMED CONSENT1
Benefits, risks, and possible complications of procedure explained to patient/caregiver who verbalized understanding and gave verbal consent./daughter in law,

## 2017-11-11 NOTE — ED PROVIDER NOTE - PROGRESS NOTE DETAILS
labs remarkable for mild elevation bicarb, +uti, rx for abx sent, diflucan for tinea, pt sign out to dr. umanzor to follow up ctap. likely d/c to home hospice. pt with proctocolitis due to stool impaction, will give enema, also pt with urticaria, will give Prednisone  & d/c home with ambulance given 1st enema, only small amt stool obtained, manually disimpacted stool, will give 2nd enema

## 2017-11-12 VITALS
SYSTOLIC BLOOD PRESSURE: 139 MMHG | DIASTOLIC BLOOD PRESSURE: 77 MMHG | TEMPERATURE: 99 F | RESPIRATION RATE: 16 BRPM | OXYGEN SATURATION: 99 %

## 2017-11-12 PROCEDURE — 12011 RPR F/E/E/N/L/M 2.5 CM/<: CPT

## 2017-11-12 PROCEDURE — 87186 SC STD MICRODIL/AGAR DIL: CPT

## 2017-11-12 PROCEDURE — 87086 URINE CULTURE/COLONY COUNT: CPT

## 2017-11-12 PROCEDURE — 99284 EMERGENCY DEPT VISIT MOD MDM: CPT

## 2017-11-12 PROCEDURE — 80053 COMPREHEN METABOLIC PANEL: CPT

## 2017-11-12 PROCEDURE — 70450 CT HEAD/BRAIN W/O DYE: CPT

## 2017-11-12 PROCEDURE — 81001 URINALYSIS AUTO W/SCOPE: CPT

## 2017-11-12 PROCEDURE — 96372 THER/PROPH/DIAG INJ SC/IM: CPT

## 2017-11-12 PROCEDURE — 71045 X-RAY EXAM CHEST 1 VIEW: CPT

## 2017-11-12 PROCEDURE — 74177 CT ABD & PELVIS W/CONTRAST: CPT

## 2017-11-12 PROCEDURE — 85027 COMPLETE CBC AUTOMATED: CPT

## 2017-11-12 PROCEDURE — 70486 CT MAXILLOFACIAL W/O DYE: CPT

## 2017-11-12 PROCEDURE — 96374 THER/PROPH/DIAG INJ IV PUSH: CPT | Mod: XU

## 2017-11-12 PROCEDURE — 82962 GLUCOSE BLOOD TEST: CPT

## 2017-11-12 PROCEDURE — 96375 TX/PRO/DX INJ NEW DRUG ADDON: CPT

## 2017-11-12 RX ADMIN — Medication 1 ENEMA: at 01:14

## 2017-11-12 RX ADMIN — Medication 40 MILLIGRAM(S): at 00:39

## 2017-11-13 LAB
-  AMIKACIN: SIGNIFICANT CHANGE UP
-  AMPICILLIN/SULBACTAM: SIGNIFICANT CHANGE UP
-  AMPICILLIN: SIGNIFICANT CHANGE UP
-  AZTREONAM: SIGNIFICANT CHANGE UP
-  CEFAZOLIN: SIGNIFICANT CHANGE UP
-  CEFEPIME: SIGNIFICANT CHANGE UP
-  CEFOTAXIME: SIGNIFICANT CHANGE UP
-  CEFOXITIN: SIGNIFICANT CHANGE UP
-  CEFTAZIDIME: SIGNIFICANT CHANGE UP
-  CEFTRIAXONE: SIGNIFICANT CHANGE UP
-  CEFUROXIME: SIGNIFICANT CHANGE UP
-  CIPROFLOXACIN: SIGNIFICANT CHANGE UP
-  ERTAPENEM: SIGNIFICANT CHANGE UP
-  GENTAMICIN: SIGNIFICANT CHANGE UP
-  IMIPENEM: SIGNIFICANT CHANGE UP
-  LEVOFLOXACIN: SIGNIFICANT CHANGE UP
-  MEROPENEM: SIGNIFICANT CHANGE UP
-  NITROFURANTOIN: SIGNIFICANT CHANGE UP
-  PIPERACILLIN/TAZOBACTAM: SIGNIFICANT CHANGE UP
-  TIGECYCLINE: SIGNIFICANT CHANGE UP
-  TOBRAMYCIN: SIGNIFICANT CHANGE UP
-  TRIMETHOPRIM/SULFAMETHOXAZOLE: SIGNIFICANT CHANGE UP
CULTURE RESULTS: SIGNIFICANT CHANGE UP
METHOD TYPE: SIGNIFICANT CHANGE UP
ORGANISM # SPEC MICROSCOPIC CNT: SIGNIFICANT CHANGE UP
ORGANISM # SPEC MICROSCOPIC CNT: SIGNIFICANT CHANGE UP
SPECIMEN SOURCE: SIGNIFICANT CHANGE UP

## 2018-05-31 ENCOUNTER — INPATIENT (INPATIENT)
Facility: HOSPITAL | Age: 83
LOS: 7 days | Discharge: HOME | DRG: 871 | End: 2018-06-08
Attending: HOSPITALIST | Admitting: HOSPITALIST
Payer: MEDICARE

## 2018-05-31 VITALS
TEMPERATURE: 100 F | RESPIRATION RATE: 16 BRPM | SYSTOLIC BLOOD PRESSURE: 107 MMHG | OXYGEN SATURATION: 95 % | WEIGHT: 119.93 LBS | DIASTOLIC BLOOD PRESSURE: 72 MMHG | HEART RATE: 96 BPM

## 2018-05-31 LAB
ALBUMIN SERPL ELPH-MCNC: 2.4 G/DL — LOW (ref 3.5–5)
ALP SERPL-CCNC: 51 U/L — SIGNIFICANT CHANGE UP (ref 40–120)
ALT FLD-CCNC: 11 U/L DA — SIGNIFICANT CHANGE UP (ref 10–60)
ANION GAP SERPL CALC-SCNC: 7 MMOL/L — SIGNIFICANT CHANGE UP (ref 5–17)
AST SERPL-CCNC: 7 U/L — LOW (ref 10–40)
BASOPHILS # BLD AUTO: 0.1 K/UL — SIGNIFICANT CHANGE UP (ref 0–0.2)
BASOPHILS NFR BLD AUTO: 1 % — SIGNIFICANT CHANGE UP (ref 0–2)
BILIRUB SERPL-MCNC: 0.5 MG/DL — SIGNIFICANT CHANGE UP (ref 0.2–1.2)
BUN SERPL-MCNC: 13 MG/DL — SIGNIFICANT CHANGE UP (ref 7–18)
CALCIUM SERPL-MCNC: 8.5 MG/DL — SIGNIFICANT CHANGE UP (ref 8.4–10.5)
CHLORIDE SERPL-SCNC: 101 MMOL/L — SIGNIFICANT CHANGE UP (ref 96–108)
CO2 SERPL-SCNC: 27 MMOL/L — SIGNIFICANT CHANGE UP (ref 22–31)
CREAT SERPL-MCNC: 0.6 MG/DL — SIGNIFICANT CHANGE UP (ref 0.5–1.3)
EOSINOPHIL # BLD AUTO: 0.3 K/UL — SIGNIFICANT CHANGE UP (ref 0–0.5)
EOSINOPHIL NFR BLD AUTO: 2.4 % — SIGNIFICANT CHANGE UP (ref 0–6)
GLUCOSE SERPL-MCNC: 103 MG/DL — HIGH (ref 70–99)
HCT VFR BLD CALC: 35 % — SIGNIFICANT CHANGE UP (ref 34.5–45)
HGB BLD-MCNC: 10.9 G/DL — LOW (ref 11.5–15.5)
LACTATE SERPL-SCNC: 1.2 MMOL/L — SIGNIFICANT CHANGE UP (ref 0.7–2)
LYMPHOCYTES # BLD AUTO: 24.8 % — SIGNIFICANT CHANGE UP (ref 13–44)
LYMPHOCYTES # BLD AUTO: 3 K/UL — SIGNIFICANT CHANGE UP (ref 1–3.3)
MCHC RBC-ENTMCNC: 26.4 PG — LOW (ref 27–34)
MCHC RBC-ENTMCNC: 31.3 GM/DL — LOW (ref 32–36)
MCV RBC AUTO: 84.5 FL — SIGNIFICANT CHANGE UP (ref 80–100)
MONOCYTES # BLD AUTO: 0.9 K/UL — SIGNIFICANT CHANGE UP (ref 0–0.9)
MONOCYTES NFR BLD AUTO: 7.6 % — SIGNIFICANT CHANGE UP (ref 2–14)
NEUTROPHILS # BLD AUTO: 7.7 K/UL — HIGH (ref 1.8–7.4)
NEUTROPHILS NFR BLD AUTO: 64.2 % — SIGNIFICANT CHANGE UP (ref 43–77)
PLATELET # BLD AUTO: 322 K/UL — SIGNIFICANT CHANGE UP (ref 150–400)
POTASSIUM SERPL-MCNC: 4.2 MMOL/L — SIGNIFICANT CHANGE UP (ref 3.5–5.3)
POTASSIUM SERPL-SCNC: 4.2 MMOL/L — SIGNIFICANT CHANGE UP (ref 3.5–5.3)
PROT SERPL-MCNC: 6.8 G/DL — SIGNIFICANT CHANGE UP (ref 6–8.3)
RBC # BLD: 4.14 M/UL — SIGNIFICANT CHANGE UP (ref 3.8–5.2)
RBC # FLD: 13 % — SIGNIFICANT CHANGE UP (ref 10.3–14.5)
SODIUM SERPL-SCNC: 135 MMOL/L — SIGNIFICANT CHANGE UP (ref 135–145)
WBC # BLD: 12 K/UL — HIGH (ref 3.8–10.5)
WBC # FLD AUTO: 12 K/UL — HIGH (ref 3.8–10.5)

## 2018-05-31 PROCEDURE — 74177 CT ABD & PELVIS W/CONTRAST: CPT | Mod: 26

## 2018-05-31 RX ORDER — PIPERACILLIN AND TAZOBACTAM 4; .5 G/20ML; G/20ML
3.38 INJECTION, POWDER, LYOPHILIZED, FOR SOLUTION INTRAVENOUS ONCE
Qty: 0 | Refills: 0 | Status: COMPLETED | OUTPATIENT
Start: 2018-05-31 | End: 2018-05-31

## 2018-05-31 RX ORDER — SODIUM CHLORIDE 9 MG/ML
1000 INJECTION INTRAMUSCULAR; INTRAVENOUS; SUBCUTANEOUS ONCE
Qty: 0 | Refills: 0 | Status: COMPLETED | OUTPATIENT
Start: 2018-05-31 | End: 2018-05-31

## 2018-05-31 RX ORDER — MORPHINE SULFATE 50 MG/1
4 CAPSULE, EXTENDED RELEASE ORAL ONCE
Qty: 0 | Refills: 0 | Status: DISCONTINUED | OUTPATIENT
Start: 2018-05-31 | End: 2018-05-31

## 2018-05-31 RX ADMIN — SODIUM CHLORIDE 1000 MILLILITER(S): 9 INJECTION INTRAMUSCULAR; INTRAVENOUS; SUBCUTANEOUS at 19:52

## 2018-05-31 RX ADMIN — MORPHINE SULFATE 4 MILLIGRAM(S): 50 CAPSULE, EXTENDED RELEASE ORAL at 22:43

## 2018-05-31 RX ADMIN — PIPERACILLIN AND TAZOBACTAM 200 GRAM(S): 4; .5 INJECTION, POWDER, LYOPHILIZED, FOR SOLUTION INTRAVENOUS at 22:44

## 2018-05-31 NOTE — ED ADULT NURSE NOTE - OBJECTIVE STATEMENT
81 y/o F pt w/ PMhx c/o brown vaginal discharge, foul-smelling x 3 weeks, fever x 5 days. Daughter in law at bedside; relates hx. Pt was brought to PMD for same complaints some weeks; PMD rx'd flagyl w/ no relief at all. Daughter in law relates that pt has been getting weaker and is having difficulty walking. Multiple suppositories w/ no relief. No other complaints. NKDA.

## 2018-05-31 NOTE — ED PROVIDER NOTE - CARE PLAN
Principal Discharge DX:	Constipation  Secondary Diagnosis:	Dementia with behavioral disturbance, unspecified dementia type  Secondary Diagnosis:	Colitis

## 2018-06-01 ENCOUNTER — OUTPATIENT (OUTPATIENT)
Dept: OUTPATIENT SERVICES | Facility: HOSPITAL | Age: 83
LOS: 1 days | End: 2018-06-01
Payer: MEDICARE

## 2018-06-01 DIAGNOSIS — A41.9 SEPSIS, UNSPECIFIED ORGANISM: ICD-10-CM

## 2018-06-01 DIAGNOSIS — K52.9 NONINFECTIVE GASTROENTERITIS AND COLITIS, UNSPECIFIED: ICD-10-CM

## 2018-06-01 DIAGNOSIS — K59.00 CONSTIPATION, UNSPECIFIED: ICD-10-CM

## 2018-06-01 DIAGNOSIS — F03.91 UNSPECIFIED DEMENTIA WITH BEHAVIORAL DISTURBANCE: ICD-10-CM

## 2018-06-01 DIAGNOSIS — Z29.9 ENCOUNTER FOR PROPHYLACTIC MEASURES, UNSPECIFIED: ICD-10-CM

## 2018-06-01 DIAGNOSIS — N89.8 OTHER SPECIFIED NONINFLAMMATORY DISORDERS OF VAGINA: ICD-10-CM

## 2018-06-01 LAB
24R-OH-CALCIDIOL SERPL-MCNC: 35 NG/ML — SIGNIFICANT CHANGE UP (ref 30–80)
ALBUMIN SERPL ELPH-MCNC: 2.2 G/DL — LOW (ref 3.5–5)
ALP SERPL-CCNC: 51 U/L — SIGNIFICANT CHANGE UP (ref 40–120)
ALT FLD-CCNC: 11 U/L DA — SIGNIFICANT CHANGE UP (ref 10–60)
ANION GAP SERPL CALC-SCNC: 7 MMOL/L — SIGNIFICANT CHANGE UP (ref 5–17)
APPEARANCE UR: CLEAR — SIGNIFICANT CHANGE UP
AST SERPL-CCNC: 7 U/L — LOW (ref 10–40)
BACTERIA # UR AUTO: ABNORMAL /HPF
BASOPHILS # BLD AUTO: 0.1 K/UL — SIGNIFICANT CHANGE UP (ref 0–0.2)
BASOPHILS NFR BLD AUTO: 1 % — SIGNIFICANT CHANGE UP (ref 0–2)
BILIRUB SERPL-MCNC: 0.6 MG/DL — SIGNIFICANT CHANGE UP (ref 0.2–1.2)
BILIRUB UR-MCNC: NEGATIVE — SIGNIFICANT CHANGE UP
BUN SERPL-MCNC: 9 MG/DL — SIGNIFICANT CHANGE UP (ref 7–18)
CALCIUM SERPL-MCNC: 8.1 MG/DL — LOW (ref 8.4–10.5)
CHLORIDE SERPL-SCNC: 106 MMOL/L — SIGNIFICANT CHANGE UP (ref 96–108)
CO2 SERPL-SCNC: 26 MMOL/L — SIGNIFICANT CHANGE UP (ref 22–31)
COLOR SPEC: YELLOW — SIGNIFICANT CHANGE UP
CREAT SERPL-MCNC: 0.6 MG/DL — SIGNIFICANT CHANGE UP (ref 0.5–1.3)
DIFF PNL FLD: ABNORMAL
EOSINOPHIL # BLD AUTO: 0.4 K/UL — SIGNIFICANT CHANGE UP (ref 0–0.5)
EOSINOPHIL NFR BLD AUTO: 4 % — SIGNIFICANT CHANGE UP (ref 0–6)
EPI CELLS # UR: SIGNIFICANT CHANGE UP /HPF
FOLATE SERPL-MCNC: >20 NG/ML — SIGNIFICANT CHANGE UP
GLUCOSE SERPL-MCNC: 128 MG/DL — HIGH (ref 70–99)
GLUCOSE UR QL: NEGATIVE — SIGNIFICANT CHANGE UP
HBA1C BLD-MCNC: 5.9 % — HIGH (ref 4–5.6)
HCT VFR BLD CALC: 32.3 % — LOW (ref 34.5–45)
HGB BLD-MCNC: 10 G/DL — LOW (ref 11.5–15.5)
INR BLD: 1.19 RATIO — HIGH (ref 0.88–1.16)
KETONES UR-MCNC: NEGATIVE — SIGNIFICANT CHANGE UP
LEUKOCYTE ESTERASE UR-ACNC: ABNORMAL
LYMPHOCYTES # BLD AUTO: 2.5 K/UL — SIGNIFICANT CHANGE UP (ref 1–3.3)
LYMPHOCYTES # BLD AUTO: 23.5 % — SIGNIFICANT CHANGE UP (ref 13–44)
MAGNESIUM SERPL-MCNC: 2.2 MG/DL — SIGNIFICANT CHANGE UP (ref 1.6–2.6)
MCHC RBC-ENTMCNC: 26.6 PG — LOW (ref 27–34)
MCHC RBC-ENTMCNC: 30.8 GM/DL — LOW (ref 32–36)
MCV RBC AUTO: 86.1 FL — SIGNIFICANT CHANGE UP (ref 80–100)
MONOCYTES # BLD AUTO: 0.6 K/UL — SIGNIFICANT CHANGE UP (ref 0–0.9)
MONOCYTES NFR BLD AUTO: 5.4 % — SIGNIFICANT CHANGE UP (ref 2–14)
NEUTROPHILS # BLD AUTO: 7.1 K/UL — SIGNIFICANT CHANGE UP (ref 1.8–7.4)
NEUTROPHILS NFR BLD AUTO: 66.2 % — SIGNIFICANT CHANGE UP (ref 43–77)
NITRITE UR-MCNC: POSITIVE
PH UR: 7 — SIGNIFICANT CHANGE UP (ref 5–8)
PHOSPHATE SERPL-MCNC: 2.9 MG/DL — SIGNIFICANT CHANGE UP (ref 2.5–4.5)
PLATELET # BLD AUTO: 300 K/UL — SIGNIFICANT CHANGE UP (ref 150–400)
POTASSIUM SERPL-MCNC: 3.8 MMOL/L — SIGNIFICANT CHANGE UP (ref 3.5–5.3)
POTASSIUM SERPL-SCNC: 3.8 MMOL/L — SIGNIFICANT CHANGE UP (ref 3.5–5.3)
PROT SERPL-MCNC: 6.3 G/DL — SIGNIFICANT CHANGE UP (ref 6–8.3)
PROT UR-MCNC: NEGATIVE — SIGNIFICANT CHANGE UP
PROTHROM AB SERPL-ACNC: 13 SEC — HIGH (ref 9.8–12.7)
RBC # BLD: 3.75 M/UL — LOW (ref 3.8–5.2)
RBC # FLD: 12.8 % — SIGNIFICANT CHANGE UP (ref 10.3–14.5)
RBC CASTS # UR COMP ASSIST: ABNORMAL /HPF (ref 0–2)
SODIUM SERPL-SCNC: 139 MMOL/L — SIGNIFICANT CHANGE UP (ref 135–145)
SP GR SPEC: 1.01 — SIGNIFICANT CHANGE UP (ref 1.01–1.02)
TSH SERPL-MCNC: 0.91 UU/ML — SIGNIFICANT CHANGE UP (ref 0.34–4.82)
UROBILINOGEN FLD QL: NEGATIVE — SIGNIFICANT CHANGE UP
VIT B12 SERPL-MCNC: 1277 PG/ML — HIGH (ref 232–1245)
WBC # BLD: 10.7 K/UL — HIGH (ref 3.8–10.5)
WBC # FLD AUTO: 10.7 K/UL — HIGH (ref 3.8–10.5)
WBC UR QL: SIGNIFICANT CHANGE UP /HPF (ref 0–5)

## 2018-06-01 PROCEDURE — 71045 X-RAY EXAM CHEST 1 VIEW: CPT | Mod: 26

## 2018-06-01 PROCEDURE — 99223 1ST HOSP IP/OBS HIGH 75: CPT | Mod: AI,GC

## 2018-06-01 PROCEDURE — 99223 1ST HOSP IP/OBS HIGH 75: CPT

## 2018-06-01 PROCEDURE — 99285 EMERGENCY DEPT VISIT HI MDM: CPT | Mod: 25

## 2018-06-01 RX ORDER — SODIUM CHLORIDE 9 MG/ML
1000 INJECTION INTRAMUSCULAR; INTRAVENOUS; SUBCUTANEOUS
Qty: 0 | Refills: 0 | Status: DISCONTINUED | OUTPATIENT
Start: 2018-06-01 | End: 2018-06-01

## 2018-06-01 RX ORDER — SERTRALINE 25 MG/1
1 TABLET, FILM COATED ORAL
Qty: 0 | Refills: 0 | COMMUNITY

## 2018-06-01 RX ORDER — MEMANTINE HYDROCHLORIDE 10 MG/1
1 TABLET ORAL
Qty: 0 | Refills: 0 | COMMUNITY

## 2018-06-01 RX ORDER — SODIUM CHLORIDE 9 MG/ML
1000 INJECTION INTRAMUSCULAR; INTRAVENOUS; SUBCUTANEOUS ONCE
Qty: 0 | Refills: 0 | Status: COMPLETED | OUTPATIENT
Start: 2018-06-01 | End: 2018-06-01

## 2018-06-01 RX ORDER — DIVALPROEX SODIUM 500 MG/1
1 TABLET, DELAYED RELEASE ORAL
Qty: 0 | Refills: 0 | COMMUNITY

## 2018-06-01 RX ORDER — METFORMIN HYDROCHLORIDE 850 MG/1
1 TABLET ORAL
Qty: 0 | Refills: 0 | COMMUNITY

## 2018-06-01 RX ORDER — ACETAMINOPHEN 500 MG
650 TABLET ORAL EVERY 6 HOURS
Qty: 0 | Refills: 0 | Status: DISCONTINUED | OUTPATIENT
Start: 2018-06-01 | End: 2018-06-01

## 2018-06-01 RX ORDER — MULTIVIT WITH MIN/MFOLATE/K2 340-15/3 G
300 POWDER (GRAM) ORAL ONCE
Qty: 0 | Refills: 0 | Status: COMPLETED | OUTPATIENT
Start: 2018-06-01 | End: 2018-06-01

## 2018-06-01 RX ORDER — DOCUSATE SODIUM 100 MG
100 CAPSULE ORAL THREE TIMES A DAY
Qty: 0 | Refills: 0 | Status: DISCONTINUED | OUTPATIENT
Start: 2018-06-01 | End: 2018-06-06

## 2018-06-01 RX ORDER — LACTULOSE 10 G/15ML
10 SOLUTION ORAL THREE TIMES A DAY
Qty: 0 | Refills: 0 | Status: DISCONTINUED | OUTPATIENT
Start: 2018-06-01 | End: 2018-06-06

## 2018-06-01 RX ORDER — ACETAMINOPHEN 500 MG
650 TABLET ORAL EVERY 6 HOURS
Qty: 0 | Refills: 0 | Status: DISCONTINUED | OUTPATIENT
Start: 2018-06-01 | End: 2018-06-06

## 2018-06-01 RX ORDER — POLYETHYLENE GLYCOL 3350 17 G/17G
17 POWDER, FOR SOLUTION ORAL DAILY
Qty: 0 | Refills: 0 | Status: DISCONTINUED | OUTPATIENT
Start: 2018-06-01 | End: 2018-06-01

## 2018-06-01 RX ORDER — LACTULOSE 10 G/15ML
10 SOLUTION ORAL THREE TIMES A DAY
Qty: 0 | Refills: 0 | Status: DISCONTINUED | OUTPATIENT
Start: 2018-06-01 | End: 2018-06-01

## 2018-06-01 RX ORDER — LACTULOSE 10 G/15ML
1 SOLUTION ORAL
Qty: 0 | Refills: 0 | COMMUNITY

## 2018-06-01 RX ORDER — SODIUM CHLORIDE 9 MG/ML
500 INJECTION INTRAMUSCULAR; INTRAVENOUS; SUBCUTANEOUS ONCE
Qty: 0 | Refills: 0 | Status: COMPLETED | OUTPATIENT
Start: 2018-06-01 | End: 2018-06-01

## 2018-06-01 RX ORDER — TEMAZEPAM 15 MG/1
30 CAPSULE ORAL AT BEDTIME
Qty: 0 | Refills: 0 | Status: DISCONTINUED | OUTPATIENT
Start: 2018-06-01 | End: 2018-06-07

## 2018-06-01 RX ORDER — PIPERACILLIN AND TAZOBACTAM 4; .5 G/20ML; G/20ML
3.38 INJECTION, POWDER, LYOPHILIZED, FOR SOLUTION INTRAVENOUS EVERY 8 HOURS
Qty: 0 | Refills: 0 | Status: DISCONTINUED | OUTPATIENT
Start: 2018-06-01 | End: 2018-06-02

## 2018-06-01 RX ORDER — POLYETHYLENE GLYCOL 3350 17 G/17G
17 POWDER, FOR SOLUTION ORAL DAILY
Qty: 0 | Refills: 0 | Status: DISCONTINUED | OUTPATIENT
Start: 2018-06-01 | End: 2018-06-06

## 2018-06-01 RX ORDER — OMEPRAZOLE 10 MG/1
1 CAPSULE, DELAYED RELEASE ORAL
Qty: 0 | Refills: 0 | COMMUNITY

## 2018-06-01 RX ORDER — ACETAMINOPHEN 500 MG
1000 TABLET ORAL ONCE
Qty: 0 | Refills: 0 | Status: COMPLETED | OUTPATIENT
Start: 2018-06-01 | End: 2018-06-01

## 2018-06-01 RX ORDER — ENOXAPARIN SODIUM 100 MG/ML
40 INJECTION SUBCUTANEOUS DAILY
Qty: 0 | Refills: 0 | Status: DISCONTINUED | OUTPATIENT
Start: 2018-06-01 | End: 2018-06-08

## 2018-06-01 RX ORDER — CLONAZEPAM 1 MG
1 TABLET ORAL
Qty: 0 | Refills: 0 | COMMUNITY

## 2018-06-01 RX ORDER — SODIUM CHLORIDE 9 MG/ML
1000 INJECTION INTRAMUSCULAR; INTRAVENOUS; SUBCUTANEOUS
Qty: 0 | Refills: 0 | Status: DISCONTINUED | OUTPATIENT
Start: 2018-06-01 | End: 2018-06-04

## 2018-06-01 RX ORDER — HYDROMORPHONE HYDROCHLORIDE 2 MG/ML
0.5 INJECTION INTRAMUSCULAR; INTRAVENOUS; SUBCUTANEOUS ONCE
Qty: 0 | Refills: 0 | Status: DISCONTINUED | OUTPATIENT
Start: 2018-06-01 | End: 2018-06-01

## 2018-06-01 RX ORDER — SENNA PLUS 8.6 MG/1
2 TABLET ORAL AT BEDTIME
Qty: 0 | Refills: 0 | Status: DISCONTINUED | OUTPATIENT
Start: 2018-06-01 | End: 2018-06-08

## 2018-06-01 RX ORDER — RISPERIDONE 4 MG/1
1 TABLET ORAL DAILY
Qty: 0 | Refills: 0 | Status: DISCONTINUED | OUTPATIENT
Start: 2018-06-01 | End: 2018-06-04

## 2018-06-01 RX ADMIN — SODIUM CHLORIDE 1000 MILLILITER(S): 9 INJECTION INTRAMUSCULAR; INTRAVENOUS; SUBCUTANEOUS at 06:52

## 2018-06-01 RX ADMIN — PIPERACILLIN AND TAZOBACTAM 25 GRAM(S): 4; .5 INJECTION, POWDER, LYOPHILIZED, FOR SOLUTION INTRAVENOUS at 13:42

## 2018-06-01 RX ADMIN — Medication 300 MILLILITER(S): at 13:42

## 2018-06-01 RX ADMIN — SENNA PLUS 2 TABLET(S): 8.6 TABLET ORAL at 11:30

## 2018-06-01 RX ADMIN — RISPERIDONE 1 MILLIGRAM(S): 4 TABLET ORAL at 11:31

## 2018-06-01 RX ADMIN — POLYETHYLENE GLYCOL 3350 17 GRAM(S): 17 POWDER, FOR SOLUTION ORAL at 11:31

## 2018-06-01 RX ADMIN — SODIUM CHLORIDE 75 MILLILITER(S): 9 INJECTION INTRAMUSCULAR; INTRAVENOUS; SUBCUTANEOUS at 09:00

## 2018-06-01 RX ADMIN — Medication 100 MILLIGRAM(S): at 11:31

## 2018-06-01 RX ADMIN — SODIUM CHLORIDE 75 MILLILITER(S): 9 INJECTION INTRAMUSCULAR; INTRAVENOUS; SUBCUTANEOUS at 22:25

## 2018-06-01 RX ADMIN — Medication 1000 MILLIGRAM(S): at 14:53

## 2018-06-01 RX ADMIN — PIPERACILLIN AND TAZOBACTAM 25 GRAM(S): 4; .5 INJECTION, POWDER, LYOPHILIZED, FOR SOLUTION INTRAVENOUS at 05:42

## 2018-06-01 RX ADMIN — HYDROMORPHONE HYDROCHLORIDE 0.5 MILLIGRAM(S): 2 INJECTION INTRAMUSCULAR; INTRAVENOUS; SUBCUTANEOUS at 22:02

## 2018-06-01 RX ADMIN — PIPERACILLIN AND TAZOBACTAM 25 GRAM(S): 4; .5 INJECTION, POWDER, LYOPHILIZED, FOR SOLUTION INTRAVENOUS at 22:02

## 2018-06-01 RX ADMIN — MORPHINE SULFATE 4 MILLIGRAM(S): 50 CAPSULE, EXTENDED RELEASE ORAL at 00:48

## 2018-06-01 RX ADMIN — SODIUM CHLORIDE 1000 MILLILITER(S): 9 INJECTION INTRAMUSCULAR; INTRAVENOUS; SUBCUTANEOUS at 01:49

## 2018-06-01 RX ADMIN — TEMAZEPAM 30 MILLIGRAM(S): 15 CAPSULE ORAL at 22:03

## 2018-06-01 RX ADMIN — ENOXAPARIN SODIUM 40 MILLIGRAM(S): 100 INJECTION SUBCUTANEOUS at 11:32

## 2018-06-01 RX ADMIN — Medication 400 MILLIGRAM(S): at 13:07

## 2018-06-01 RX ADMIN — LACTULOSE 10 GRAM(S): 10 SOLUTION ORAL at 22:03

## 2018-06-01 RX ADMIN — LACTULOSE 10 GRAM(S): 10 SOLUTION ORAL at 11:31

## 2018-06-01 RX ADMIN — LACTULOSE 10 GRAM(S): 10 SOLUTION ORAL at 13:42

## 2018-06-01 RX ADMIN — HYDROMORPHONE HYDROCHLORIDE 0.5 MILLIGRAM(S): 2 INJECTION INTRAMUSCULAR; INTRAVENOUS; SUBCUTANEOUS at 23:00

## 2018-06-01 RX ADMIN — Medication 100 MILLIGRAM(S): at 13:43

## 2018-06-01 RX ADMIN — SENNA PLUS 2 TABLET(S): 8.6 TABLET ORAL at 22:03

## 2018-06-01 RX ADMIN — SODIUM CHLORIDE 1000 MILLILITER(S): 9 INJECTION INTRAMUSCULAR; INTRAVENOUS; SUBCUTANEOUS at 08:40

## 2018-06-01 RX ADMIN — SODIUM CHLORIDE 75 MILLILITER(S): 9 INJECTION INTRAMUSCULAR; INTRAVENOUS; SUBCUTANEOUS at 00:49

## 2018-06-01 RX ADMIN — Medication 1 MILLIGRAM(S): at 14:56

## 2018-06-01 NOTE — H&P ADULT - PROBLEM SELECTOR PLAN 5
IMPROVE VTE Individual Risk Assessment          RISK                                                          Points  [  ] Previous VTE                                                3  [  ] Thrombophilia                                             2  [  ] Lower limb paralysis                                   2        (unable to hold up >15 seconds)    [  ] Current Cancer                                             2         (within 6 months)  [ x ] Immobilization > 24 hrs                              1  [  ] ICU/CCU stay > 24 hours                             1  [  x] Age > 60                                                         1    IMPROVE VTE Score: 2, lovenox for dvt ppx dysphagia diet  nonverbal , bedbound  c/w ativan PRN, risperdal, temazepam hs PRN for insomnia  patient is on home hospice  aspiration and fall risk  DNR/DNI dysphagia diet  nonverbal , bedbound  c/w ativan PRN, Risperdal, temazepam hs PRN for insomnia  patient is on home hospice  aspiration and fall risk  DNR/DNI

## 2018-06-01 NOTE — CHART NOTE - NSCHARTNOTEFT_GEN_A_CORE
Pt has stercoral colitis and CT abd also showed rectal wall thickening and perirectal edema with large amount of stool. Spoke to surgery team Lenajose and pt s daughter at bedside Tam Moe and pt s grand daughter Sunshine regarding futher testing which they agreed to. But if there is recto vaginal fistula found they want to discuss with her brother and decide later if they want to pursue repair and surgery.   Spoke with Radiologist Dr. Gupta regarding what contrast study is best for r/o recto vaginal fistula in this pt given rectal thickeninga nd edema. He advised oral laxatives to clear the stool in rectum. He suggested not to do finger disimpaction or enema as risk for perforation and trauma. Once stool is cleared, pt can get CT abd and pelvis with oral and iv contrast wait for 3 to 4 hours till contrast reached rectum to look for fistula. So giving colace, senna, lactulose, miralax bowel regimen.  Discussed plan with Attending and family. Pt has stercoral colitis and CT abd also showed rectal wall thickening and perirectal edema with large amount of stool. Spoke to surgery team Abelmeme and pt s daughter at bedside Tam Moe and pt s grand daughter Sunshine regarding further testing which they agreed to. But if there is recto vaginal fistula found they want to discuss with her brother and decide later if they want to pursue repair and surgery.   Spoke with Radiologist Dr. Gupta regarding what contrast study is best for r/o recto vaginal fistula in this pt given rectal thickening and edema. He advised oral laxatives to clear the stool in rectum. He suggested not to do finger disimpaction or enema as risk for perforation and trauma. Once stool is cleared, pt can get CT abd and pelvis with oral and iv contrast wait for 3 to 4 hours till contrast reached rectum to look for fistula. So giving colace, senna, lactulose, Miralax bowel regimen.  Discussed plan with Attending and family.    ATTENDING ADDENDUM:  patient with Stercoral colitis.  Abdominal pain likely due to colitis.  Continue bowel regimen.  Plan discussed with family multiple times today. All the questions and concerns were addressed.  Overall prognosis is guarded.  supportive and palliative  measures.

## 2018-06-01 NOTE — H&P ADULT - NSHPLANGTRANSLATORFT_GEN_A_CORE
patient is nonverbal, spoke to HCP daughter-in-law Carine who is English speaking, via telephone, for history

## 2018-06-01 NOTE — H&P ADULT - ASSESSMENT
Patient is a 82F from home, lives with son, has HHA, nonverbal, bedbound x 3 weeks, on home hospice, DNR/DNI, with PMH dementia (worsening over past 6 mo) brought in by daughter in law Carine Younger [phone] as recommended by hospice MD due to leukocytosis, unknown number. Admitted for vaginal discharge, stercoral colitis, r/o rectovaginal fistula. Patient is a 82F from home, lives with son, has HHA, nonverbal, bedbound x 3 weeks, on home hospice, DNR/DNI, with PMH dementia (worsening over past 6 mo) brought in by daughter in law Carine Younger [phone] as recommended by hospice MD due to leukocytosis, unknown number. Admitted for sepsis 2/2 stercoral colitis, r/o rectovaginal fistula.

## 2018-06-01 NOTE — CHART NOTE - NSCHARTNOTEFT_GEN_A_CORE
Patient is a 82F from home, lives with son, has HHA, nonverbal, bedbound x 3 weeks, on home hospice, DNR/DNI, with PMH dementia (worsening over past 6 mo) brought in by daughter in law Carine Younger [phone] as recommended by hospice MD due to leukocytosis, unknown number. Patient was brought to PCP a few weeks ago for 3 weeks dark colored vaginal discharge mixed with feces and pus, was prescribed flagyl 250 TID x 7 days (completed course) and fever x 3 days. As per daughter - in -law has reported 3 week weight loss and poor po intake, 20 lbs x 3 weeks, denies SOB, chest pain, vomiting, diarrhea    < from: CT Abdomen and Pelvis w/ IV Cont (05.31.18 @ 22:38) >    GI tract: No evidence of small bowel obstruction. Markedly stool   distended rectum with perirectal wall thickening and presacral edema.   Increased stool in the remainder of the colon.    Peritoneum/retroperitoneum and mesentery: No free air. No organized fluid   collection. No adenopathy.        < end of copied text >    < from: CT Abdomen and Pelvis w/ IV Cont (05.31.18 @ 22:38) >    Markedly stool distended rectum with rectal wall thickening and   perirectal/presacral edema. Findings suggest stercoral colitis.   Evaluation for rectovaginal fistula is nondiagnostic without   administration of rectal contrast.    < end of copied text >    Pt resting comfortably in bed, NAD  no complaints        medical team pursuing dx of poss rectovaginal fistula  medicine to consult GYN    Pt's daughter at bedside who states she is proxy; had conversation with medical team; they are undecided about pursuing any surgical intervention if fistula found  they would like to discuss among family and will let med team about decisions  d/w Dr Fyr

## 2018-06-01 NOTE — H&P ADULT - PROBLEM SELECTOR PLAN 4
dysphagia diet  nonverbal , bedbound  c/w ativan PRN, risperdal, temazepam hs PRN for insomnia  patient is on home hospice  aspiration and fall risk  DNR/DNI c/w lactulose  adding miralax c/w lactulose  adding Miralax

## 2018-06-01 NOTE — H&P ADULT - PROBLEM SELECTOR PLAN 3
c/w lactulose  adding miralax plan as above  on exam, no pus or vaginal discharge noted  zosyn to cover for GI/ organisms  f/u cultures plan as above  on exam, no pus or vaginal discharge noted  zosyn to cover for GI /  organisms  f/u cultures

## 2018-06-01 NOTE — H&P ADULT - NSHPLABSRESULTS_GEN_ALL_CORE
LABS:                        10.9   12.0  )-----------( 322      ( 31 May 2018 20:03 )             35.0         135  |  101  |  13  ----------------------------<  103<H>  4.2   |  27  |  0.60    Ca    8.5      31 May 2018 20:16    TPro  6.8  /  Alb  2.4<L>  /  TBili  0.5  /  DBili  x   /  AST  7<L>  /  ALT  11  /  AlkPhos  51        Urinalysis Basic - ( 2018 00:44 )    Color: Yellow / Appearance: Clear / S.010 / pH: x  Gluc: x / Ketone: Negative  / Bili: Negative / Urobili: Negative   Blood: x / Protein: Negative / Nitrite: Positive   Leuk Esterase: Small / RBC: 2-5 /HPF / WBC 3-5 /HPF   Sq Epi: x / Non Sq Epi: Few /HPF / Bacteria: Many /HPF LABS:                        10.9   12.0  )-----------( 322      ( 31 May 2018 20:03 )             35.0         135  |  101  |  13  ----------------------------<  103<H>  4.2   |  27  |  0.60    Ca    8.5      31 May 2018 20:16    TPro  6.8  /  Alb  2.4<L>  /  TBili  0.5  /  DBili  x   /  AST  7<L>  /  ALT  11  /  AlkPhos  51        Urinalysis Basic - ( 2018 00:44 )    Color: Yellow / Appearance: Clear / S.010 / pH: x  Gluc: x / Ketone: Negative  / Bili: Negative / Urobili: Negative   Blood: x / Protein: Negative / Nitrite: Positive   Leuk Esterase: Small / RBC: 2-5 /HPF / WBC 3-5 /HPF   Sq Epi: x / Non Sq Epi: Few /HPF / Bacteria: Many /HPF    < from: CT Abdomen and Pelvis w/ IV Cont (18 @ 22:38) >    IMPRESSION:    Markedly stool distended rectum with rectal wall thickening and   perirectal/presacral edema. Findings suggest stercoral colitis.   Evaluation for rectovaginal fistula is nondiagnostic without   administration of rectal contrast.    < end of copied text >

## 2018-06-01 NOTE — H&P ADULT - PROBLEM SELECTOR PLAN 1
3 weeks dark vaginal discharge mixed with pus and feces  received cipro as outpatient without improvement  on admission wbc 12, afebrile  lactate normal  on exam: suprapubic tenderness  CT abd/pelvis: Markedly stool distended rectum with rectal wall thickening and   perirectal/presacral edema. Findings suggest stercoral colitis. Evaluation for rectovaginal fistula is nondiagnostic without administration of rectal contrast  house officer for surgery was consulted by ED attending, no acute intervention needed at this time, will consult surgery in AM  zon to cover for /GI pathology  UA + nitrites and LE, neg WBCs  f/u bcx, ucx  s/p 1L bolus  c/w maintenance fluids  fingerstick q 6 hours meets sepsis criteria: tachycardic, wbc 12, source of infection  will treat for colitis  3 weeks dark vaginal discharge mixed with pus and feces  received cipro as outpatient without improvement  on admission wbc 12, afebrile  lactate normal  on exam: suprapubic tenderness  CT abd/pelvis: Markedly stool distended rectum with rectal wall thickening and   perirectal/presacral edema. Findings suggest stercoral colitis. Evaluation for rectovaginal fistula is nondiagnostic without administration of rectal contrast  house officer for surgery was consulted by ED attending, no acute intervention needed at this time, will consult surgery in AM  zosyn to cover for /GI pathology  UA + nitrites and LE, neg WBCs  f/u bcx, ucx  s/p 1L bolus  c/w maintenance fluids  fingerstick q 6 hours Stercoral colitis - continue with Zosyn  iv fluids  f/u cultures

## 2018-06-01 NOTE — H&P ADULT - NSHPPHYSICALEXAM_GEN_ALL_CORE
INTERVAL HPI/OVERNIGHT EVENTS:  T(C): 36.8 (06-01-18 @ 00:25), Max: 37.7 (05-31-18 @ 18:46)  HR: 82 (06-01-18 @ 00:25) (82 - 96)  BP: 97/58 (06-01-18 @ 00:25) (97/58 - 107/72)  RR: 18 (06-01-18 @ 00:25) (16 - 18)  SpO2: 94% (06-01-18 @ 00:25) (94% - 95%)    PHYSICAL EXAM:  GENERAL: NAD, well-groomed, well-developed  HEAD:  Atraumatic, Normocephalic  EYES: EOMI, PERRLA, conjunctiva and sclera clear  ENMT: No tonsillar erythema, exudates, or enlargement; Moist mucous membranes, Good dentition, No lesions  NECK: Supple, No JVD, Normal thyroid  NERVOUS SYSTEM:  arousable to verbal stimuli, patient not cooperative with MSK exam, DTRs 2+ intact and symmetric  CHEST/LUNG: Clear to percussion bilaterally; No rales, rhonchi, wheezing, or rubs  HEART: Regular rate and rhythm; No murmurs, rubs, or gallops  ABDOMEN: Soft, suprapubic tenderness, Nondistended; Bowel sounds present  EXTREMITIES:  2+ Peripheral Pulses, No clubbing, cyanosis, or edema  LYMPH: No lymphadenopathy noted  SKIN: No rashes or lesions  : mild erythema on vaginal folds, no pus or fecal content noted INTERVAL HPI/OVERNIGHT EVENTS:  T(C): 36.8 (06-01-18 @ 00:25), Max: 37.7 (05-31-18 @ 18:46)  HR: 82 (06-01-18 @ 00:25) (82 - 96)  BP: 97/58 (06-01-18 @ 00:25) (97/58 - 107/72)  RR: 18 (06-01-18 @ 00:25) (16 - 18)  SpO2: 94% (06-01-18 @ 00:25) (94% - 95%)    PHYSICAL EXAM:  GENERAL: NAD, well-groomed, well-developed  HEAD:  Atraumatic, Normocephalic  EYES: EOMI, PERRLA, conjunctiva and sclera clear  ENMT: No tonsillar erythema, exudates, or enlargement; dry mucous membranes  NECK: Supple, No JVD, Normal thyroid  NERVOUS SYSTEM:  arousable to verbal stimuli, patient not cooperative with MSK exam, DTRs 2+ intact and symmetric  CHEST/LUNG: Clear to percussion bilaterally; No rales, rhonchi, wheezing, or rubs  HEART: Regular rate and rhythm; No murmurs, rubs, or gallops  ABDOMEN: Soft, suprapubic tenderness, Nondistended; Bowel sounds present  EXTREMITIES:  2+ Peripheral Pulses, No clubbing, cyanosis, or edema  LYMPH: No lymphadenopathy noted  SKIN: No rashes or lesions  : mild erythema on vaginal folds, no pus or fecal content noted

## 2018-06-01 NOTE — H&P ADULT - PROBLEM SELECTOR PLAN 2
plan as above  on exam, no pus or vaginal discharge noted  zosyn to cover for GI/ organisms  f/u cultures plan as above  zosyn  iv fluids  f/u cultures meets sepsis criteria: tachycardic, wbc 12, source of infection  will treat for colitis  3 weeks dark vaginal discharge mixed with pus and feces  received cipro as outpatient without improvement  on admission wbc 12, afebrile  lactate normal  on exam: suprapubic tenderness  CT abd/pelvis: Markedly stool distended rectum with rectal wall thickening and   perirectal/presacral edema. Findings suggest stercoral colitis. Evaluation for rectovaginal fistula is nondiagnostic without administration of rectal contrast  house officer for surgery was consulted by ED attending, no acute intervention needed at this time, will consult surgery in AM  zosyn to cover for / GI pathology  UA + nitrites and LE, neg WBCs  f/u blood culture and urine culture  s/p 1L bolus  c/w maintenance fluids  fingerstick q 6 hours

## 2018-06-01 NOTE — H&P ADULT - NSHPSOURCEINFORD_GEN_ALL_CORE
Home Health Aide or Other Non-Family Caregiver/Legal guardian Home Health Aide or Other Non-Family Caregiver/Other Family Member/Legal guardian

## 2018-06-01 NOTE — H&P ADULT - PROBLEM SELECTOR PLAN 6
IMPROVE VTE Individual Risk Assessment          RISK                                                          Points  [  ] Previous VTE                                                3  [  ] Thrombophilia                                             2  [  ] Lower limb paralysis                                   2        (unable to hold up >15 seconds)    [  ] Current Cancer                                             2         (within 6 months)  [ x ] Immobilization > 24 hrs                              1  [  ] ICU/CCU stay > 24 hours                             1  [  x] Age > 60                                                         1    IMPROVE VTE Score: 2, lovenox for dvt ppx

## 2018-06-01 NOTE — H&P ADULT - HISTORY OF PRESENT ILLNESS
Patient is a 82F from home, lives with son, has HHA, nonverbal, bedbound x 3 weeks, on home hospice, DNR/DNI, with PMH dementia (worsening over past 6 mo) brought in by daughter in law Carine Younger [phone] as recommended by hospice MD due to leukocytosis, unknown number. Patient was brought to PCP a few weeks ago for 3 weeks dark colored vaginal discharge mixed with feces and pus, was prescribed flagyl 250 TID x 7 days (completed course) and fever x 3 days. As per daughter - in -law has reported 3 week weight loss and poor po intake, 20 lbs x 3 weeks, denies SOB, chest pain, vomiting, diarrhea. Patient was seen and examined, nonverbal, no acute distress, cannot obtain ROS, however has abdominal tenderness when palpating suprapubic area. Patient is arousable to verbal stimuli. In the ED, CT abd/pelvis was done showing Markedly stool distended rectum with rectal wall thickening and perirectal/presacral edema. Findings suggest stercoral colitis. Evaluation for rectovaginal fistula is nondiagnostic without administration of rectal contrast. Daughter in law updated with results. ED attending spoke to surgery house officer who said no acute intervention warranted, can re-consult surgery in AM.    PMH: as per HPI  Surgical Hx: none  Fam Hx: none  Social Hx: neg for smoking or etoh  Allergies: NKDA

## 2018-06-01 NOTE — H&P ADULT - ATTENDING COMMENTS
Patient was seen and examined by myself. Case was discussed with house staff in details. I have reviewed and agree with the plan as outlined above with edits where appropriate. Patient was seen and examined by myself. Case was discussed with house staff in details. I have reviewed and agree with the plan as outlined above with edits where appropriate.  83 y/o F with dementia on home hospice care presents with  Abdominal pain and found to have Stercoral colitis  - continue antibiotics  Also with perineal skin redness and inflammatory changes and foul smelling discharge with concern for rectovaginal fistula  - Will benefit from repeat CT and pelvis with oral  contrast   Dysphagia- maintain aspiration precautions.  Other plan as outlined above.    Discussed with patients family multiple times about plan and they are agreeable.  GOC- comfort.

## 2018-06-02 DIAGNOSIS — N39.0 URINARY TRACT INFECTION, SITE NOT SPECIFIED: ICD-10-CM

## 2018-06-02 DIAGNOSIS — I10 ESSENTIAL (PRIMARY) HYPERTENSION: ICD-10-CM

## 2018-06-02 LAB
ANION GAP SERPL CALC-SCNC: 8 MMOL/L — SIGNIFICANT CHANGE UP (ref 5–17)
BUN SERPL-MCNC: 8 MG/DL — SIGNIFICANT CHANGE UP (ref 7–18)
CALCIUM SERPL-MCNC: 8.4 MG/DL — SIGNIFICANT CHANGE UP (ref 8.4–10.5)
CHLORIDE SERPL-SCNC: 106 MMOL/L — SIGNIFICANT CHANGE UP (ref 96–108)
CO2 SERPL-SCNC: 26 MMOL/L — SIGNIFICANT CHANGE UP (ref 22–31)
CREAT SERPL-MCNC: 0.61 MG/DL — SIGNIFICANT CHANGE UP (ref 0.5–1.3)
GLUCOSE SERPL-MCNC: 101 MG/DL — HIGH (ref 70–99)
HCT VFR BLD CALC: 34.4 % — LOW (ref 34.5–45)
HGB BLD-MCNC: 10.6 G/DL — LOW (ref 11.5–15.5)
MAGNESIUM SERPL-MCNC: 2.3 MG/DL — SIGNIFICANT CHANGE UP (ref 1.6–2.6)
MCHC RBC-ENTMCNC: 26.4 PG — LOW (ref 27–34)
MCHC RBC-ENTMCNC: 30.8 GM/DL — LOW (ref 32–36)
MCV RBC AUTO: 85.6 FL — SIGNIFICANT CHANGE UP (ref 80–100)
PHOSPHATE SERPL-MCNC: 3 MG/DL — SIGNIFICANT CHANGE UP (ref 2.5–4.5)
PLATELET # BLD AUTO: 312 K/UL — SIGNIFICANT CHANGE UP (ref 150–400)
POTASSIUM SERPL-MCNC: 3.9 MMOL/L — SIGNIFICANT CHANGE UP (ref 3.5–5.3)
POTASSIUM SERPL-SCNC: 3.9 MMOL/L — SIGNIFICANT CHANGE UP (ref 3.5–5.3)
RBC # BLD: 4.02 M/UL — SIGNIFICANT CHANGE UP (ref 3.8–5.2)
RBC # FLD: 13 % — SIGNIFICANT CHANGE UP (ref 10.3–14.5)
SODIUM SERPL-SCNC: 140 MMOL/L — SIGNIFICANT CHANGE UP (ref 135–145)
WBC # BLD: 10.8 K/UL — HIGH (ref 3.8–10.5)
WBC # FLD AUTO: 10.8 K/UL — HIGH (ref 3.8–10.5)

## 2018-06-02 PROCEDURE — 99232 SBSQ HOSP IP/OBS MODERATE 35: CPT | Mod: GC

## 2018-06-02 RX ORDER — NYSTATIN CREAM 100000 [USP'U]/G
1 CREAM TOPICAL
Qty: 0 | Refills: 0 | Status: DISCONTINUED | OUTPATIENT
Start: 2018-06-02 | End: 2018-06-08

## 2018-06-02 RX ORDER — METRONIDAZOLE 500 MG
500 TABLET ORAL EVERY 8 HOURS
Qty: 0 | Refills: 0 | Status: DISCONTINUED | OUTPATIENT
Start: 2018-06-02 | End: 2018-06-08

## 2018-06-02 RX ORDER — TRAMADOL HYDROCHLORIDE 50 MG/1
25 TABLET ORAL EVERY 12 HOURS
Qty: 0 | Refills: 0 | Status: DISCONTINUED | OUTPATIENT
Start: 2018-06-02 | End: 2018-06-03

## 2018-06-02 RX ORDER — CEFTRIAXONE 500 MG/1
1 INJECTION, POWDER, FOR SOLUTION INTRAMUSCULAR; INTRAVENOUS EVERY 24 HOURS
Qty: 0 | Refills: 0 | Status: DISCONTINUED | OUTPATIENT
Start: 2018-06-03 | End: 2018-06-08

## 2018-06-02 RX ADMIN — LACTULOSE 10 GRAM(S): 10 SOLUTION ORAL at 13:10

## 2018-06-02 RX ADMIN — TRAMADOL HYDROCHLORIDE 25 MILLIGRAM(S): 50 TABLET ORAL at 12:44

## 2018-06-02 RX ADMIN — NYSTATIN CREAM 1 APPLICATION(S): 100000 CREAM TOPICAL at 17:01

## 2018-06-02 RX ADMIN — PIPERACILLIN AND TAZOBACTAM 25 GRAM(S): 4; .5 INJECTION, POWDER, LYOPHILIZED, FOR SOLUTION INTRAVENOUS at 13:10

## 2018-06-02 RX ADMIN — TEMAZEPAM 30 MILLIGRAM(S): 15 CAPSULE ORAL at 21:18

## 2018-06-02 RX ADMIN — Medication 100 MILLIGRAM(S): at 21:18

## 2018-06-02 RX ADMIN — PIPERACILLIN AND TAZOBACTAM 25 GRAM(S): 4; .5 INJECTION, POWDER, LYOPHILIZED, FOR SOLUTION INTRAVENOUS at 06:53

## 2018-06-02 RX ADMIN — TRAMADOL HYDROCHLORIDE 25 MILLIGRAM(S): 50 TABLET ORAL at 11:44

## 2018-06-02 RX ADMIN — POLYETHYLENE GLYCOL 3350 17 GRAM(S): 17 POWDER, FOR SOLUTION ORAL at 11:43

## 2018-06-02 RX ADMIN — LACTULOSE 10 GRAM(S): 10 SOLUTION ORAL at 06:53

## 2018-06-02 RX ADMIN — SENNA PLUS 2 TABLET(S): 8.6 TABLET ORAL at 21:18

## 2018-06-02 RX ADMIN — RISPERIDONE 1 MILLIGRAM(S): 4 TABLET ORAL at 11:43

## 2018-06-02 NOTE — PROGRESS NOTE ADULT - ASSESSMENT
Patient is a 82F from home, lives with son, has HHA, nonverbal, bedbound x 3 weeks, on home hospice, DNR/DNI, with PMH dementia (worsening over past 6 mo) brought in by daughter in law Carine Younger [phone] as recommended by hospice MD due to leukocytosis, unknown number. Admitted for sepsis 2/2 stercoral colitis, r/o rectovaginal fistula.     Problem/Plan - 1:  ·  Problem: Colitis.  Plan: Stercoral colitis - continue with Zosyn  iv fluids  f/u cultures.   pt had 2 bowel movements yesterday with multiple laxatives  CT Abd/pelvis with oral and iv contrast to be done once stool is cleared to r/o rectovaginal fistula     Problem/Plan - 2:  ·  Problem: Sepsis.  Plan:   will treat for stercocolitis  WBC improving 10 today  3 weeks dark vaginal discharge mixed with pus and feces  failed cipro as outpatient   CT abd/pelvis: Markedly stool distended rectum with rectal wall thickening and perirectal/presacral edema. Findings suggest stercoral colitis. Evaluation for rectovaginal fistula is nondiagnostic without administration of rectal contrast  c/w zosyn to cover for / GI pathology  f/u blood culture and urine culture  c/w maintenance fluids  BP improved       Problem/Plan - 3:  ·  Problem: Vaginal discharge.  Plan: plan as above  on exam, no pus or vaginal discharge noted  zosyn to cover for GI /  organisms  f/u cultures.      Problem/Plan - 4:  ·  Problem: Constipation.  Plan: c/w lactulose, colce, senna, miralax     Problem/Plan - 5:  ·  Problem: Dementia with behavioral disturbance, unspecified dementia type.  Plan: dysphagia diet  nonverbal , bedbound  c/w ativan PRN, Risperdal, temazepam hs PRN for insomnia  patient is on home hospice  aspiration and fall risk  DNR/DNI.      Problem/Plan - 6:  Problem: Need for prophylactic measure. Plan: IMPROVE 2, c/w lovenox for dvt ppx

## 2018-06-02 NOTE — PROGRESS NOTE ADULT - ASSESSMENT
Patient is a 82F from home, lives with son, has HHA, nonverbal, bedbound x 3 weeks, on home hospice, DNR/DNI, with PMH dementia (worsening over past 6 mo) brought in by daughter in law Carine Younger [phone] as recommended by hospice MD due to leukocytosis, unknown number. Admitted for sepsis 2/2 stercoral colitis, r/o rectovaginal fistula.

## 2018-06-02 NOTE — PROGRESS NOTE ADULT - SUBJECTIVE AND OBJECTIVE BOX
MEDICAL ATTENDING NOTE    Patient is a 82y old  Female who presents with a chief complaint of dark vaginal discharge (2018 01:11)      INTERVAL HPI/OVERNIGHT EVENTS: no new complaints    MEDICATIONS  (STANDING):  docusate sodium 100 milliGRAM(s) Oral three times a day  enoxaparin Injectable 40 milliGRAM(s) SubCutaneous daily  lactulose Syrup 10 Gram(s) Oral three times a day  nystatin Ointment 1 Application(s) Topical two times a day  piperacillin/tazobactam IVPB. 3.375 Gram(s) IV Intermittent every 8 hours  polyethylene glycol 3350 17 Gram(s) Oral daily  risperiDONE   Tablet 1 milliGRAM(s) Oral daily  senna 2 Tablet(s) Oral at bedtime  sodium chloride 0.9%. 1000 milliLiter(s) (75 mL/Hr) IV Continuous <Continuous>    MEDICATIONS  (PRN):  acetaminophen   Tablet 650 milliGRAM(s) Oral every 6 hours PRN For Temp greater than 38 C (100.4 F)  LORazepam     Tablet 1 milliGRAM(s) Oral every 12 hours PRN Agitation  temazepam 30 milliGRAM(s) Oral at bedtime PRN Insomnia      __________________________________________________  ----------------------------------------------------------------------------------  REVIEW OF SYSTEMS: no fever, no SOB, No Chest pain; feels well      Vital Signs Last 24 Hrs  T(C): 36.6 (2018 06:00), Max: 37.4 (2018 21:20)  T(F): 97.8 (2018 06:00), Max: 99.4 (2018 21:20)  HR: 82 (2018 06:00) (82 - 95)  BP: 107/64 (2018 06:00) (107/64 - 128/59)  BP(mean): --  RR: 16 (2018 06:00) (16 - 17)  SpO2: 96% (2018 06:00) (96% - 96%)    _________________  PHYSICAL EXAM:  ---------------------------   NAD; Normocephalic;   LUNGS - no wheezing  HEART: S1 S2+   ABDOMEN: Soft, Nontender, non distended  EXTREMITIES: no cyanosis; no edema  NERVOUS SYSTEM:  Awake and alert; no new deficits    _________________________________________________  LABS:                        10.6   10.8  )-----------( 312      ( 2018 07:16 )             34.4     06-02    140  |  106  |  8   ----------------------------<  101<H>  3.9   |  26  |  0.61    Ca    8.4      2018 07:16  Phos  3.0     06-02  Mg     2.3     06-02    TPro  6.3  /  Alb  2.2<L>  /  TBili  0.6  /  DBili  x   /  AST  7<L>  /  ALT  11  /  AlkPhos  51  06-01    PT/INR - ( 2018 06:44 )   PT: 13.0 sec;   INR: 1.19 ratio           Urinalysis Basic - ( 2018 00:44 )    Color: Yellow / Appearance: Clear / S.010 / pH: x  Gluc: x / Ketone: Negative  / Bili: Negative / Urobili: Negative   Blood: x / Protein: Negative / Nitrite: Positive   Leuk Esterase: Small / RBC: 2-5 /HPF / WBC 3-5 /HPF   Sq Epi: x / Non Sq Epi: Few /HPF / Bacteria: Many /HPF      CAPILLARY BLOOD GLUCOSE      POCT Blood Glucose.: 151 mg/dL (2018 16:35)  POCT Blood Glucose.: 111 mg/dL (2018 12:08)            Care Discussed with Consultants :     Plan of care was discussed with patient ; all questions and concerns were addressed and care was aligned with patient's wishes. MEDICAL ATTENDING NOTE    Patient is a 82y old  Female who presents with a chief complaint of dark vaginal discharge (2018 01:11)      INTERVAL HPI/ OVERNIGHT EVENTS: no new complaints; appears more comfortable today.  Per home attendant at bedside, patient had large soft bowel movement earlier today.    MEDICATIONS  (STANDING):  docusate sodium 100 milliGRAM(s) Oral three times a day  enoxaparin Injectable 40 milliGRAM(s) SubCutaneous daily  lactulose Syrup 10 Gram(s) Oral three times a day  nystatin Ointment 1 Application(s) Topical two times a day  piperacillin/tazobactam IVPB. 3.375 Gram(s) IV Intermittent every 8 hours  polyethylene glycol 3350 17 Gram(s) Oral daily  risperiDONE   Tablet 1 milliGRAM(s) Oral daily  senna 2 Tablet(s) Oral at bedtime  sodium chloride 0.9%. 1000 milliLiter(s) (75 mL/Hr) IV Continuous <Continuous>    MEDICATIONS  (PRN):  acetaminophen   Tablet 650 milliGRAM(s) Oral every 6 hours PRN For Temp greater than 38 C (100.4 F)  LORazepam     Tablet 1 milliGRAM(s) Oral every 12 hours PRN Agitation  temazepam 30 milliGRAM(s) Oral at bedtime PRN Insomnia      __________________________________________________  ----------------------------------------------------------------------------------  REVIEW OF SYSTEMS: no fever and no SOB noted; patient non verbal due to dementia      Vital Signs Last 24 Hrs  T(C): 36.6 (2018 06:00), Max: 37.4 (2018 21:20)  T(F): 97.8 (2018 06:00), Max: 99.4 (2018 21:20)  HR: 82 (2018 06:00) (82 - 95)  BP: 107/64 (2018 06:00) (107/64 - 128/59)  BP(mean): --  RR: 16 (2018 06:00) (16 - 17)  SpO2: 96% (2018 06:00) (96% - 96%)    _________________  PHYSICAL EXAM:  ---------------------------   NAD; Normocephalic;   LUNGS - no wheezing; clear to auscultation  HEART: S1 S2+   ABDOMEN: Soft, Nontender, non distended; BS+  EXTREMITIES: no cyanosis; no edema  NERVOUS SYSTEM:   no new deficits    _________________________________________________  LABS:                        10.6   10.8  )-----------( 312      ( 2018 07:16 )             34.4     06-02    140  |  106  |  8   ----------------------------<  101<H>  3.9   |  26  |  0.61    Ca    8.4      2018 07:16  Phos  3.0     06-02  Mg     2.3     06-02    TPro  6.3  /  Alb  2.2<L>  /  TBili  0.6  /  DBili  x   /  AST  7<L>  /  ALT  11  /  AlkPhos  51  06-01    PT/INR - ( 2018 06:44 )   PT: 13.0 sec;   INR: 1.19 ratio           Urinalysis Basic - ( 2018 00:44 )    Color: Yellow / Appearance: Clear / S.010 / pH: x  Gluc: x / Ketone: Negative  / Bili: Negative / Urobili: Negative   Blood: x / Protein: Negative / Nitrite: Positive   Leuk Esterase: Small / RBC: 2-5 /HPF / WBC 3-5 /HPF   Sq Epi: x / Non Sq Epi: Few /HPF / Bacteria: Many /HPF      CAPILLARY BLOOD GLUCOSE      POCT Blood Glucose.: 151 mg/dL (2018 16:35)  POCT Blood Glucose.: 111 mg/dL (2018 12:08)

## 2018-06-02 NOTE — PROGRESS NOTE ADULT - PROBLEM SELECTOR PLAN 1
Continue with antibiotics  Repeat CT abd/pelvis with rectal contrast in am Continue with antibiotics  Repeat CT abd/pelvis with rectal contrast to assess for rectovaginal fistula

## 2018-06-02 NOTE — PROGRESS NOTE ADULT - SUBJECTIVE AND OBJECTIVE BOX
INTERVAL HPI/OVERNIGHT EVENTS: pt seen and examined. pt had 2 bowel movements yesterday    VITAL SIGNS:  T(F): 97.8 (18 @ 06:00)  HR: 82 (18 @ 06:00)  BP: 107/64 (18 @ 06:00)  RR: 16 (18 @ 06:00)  SpO2: 96% (18 @ 06:00)  Wt(kg): --    PHYSICAL EXAM:    Constitutional: NAD  Respiratory: ctab  Cardiovascular: S1, S2 present  Gastrointestinal: distended, firm, tenderness, bS++  Extremities: no edema  Neurological: alert and awake  Musk: bedbound    MEDICATIONS  (STANDING):  docusate sodium 100 milliGRAM(s) Oral three times a day  enoxaparin Injectable 40 milliGRAM(s) SubCutaneous daily  lactulose Syrup 10 Gram(s) Oral three times a day  nystatin Ointment 1 Application(s) Topical two times a day  piperacillin/tazobactam IVPB. 3.375 Gram(s) IV Intermittent every 8 hours  polyethylene glycol 3350 17 Gram(s) Oral daily  risperiDONE   Tablet 1 milliGRAM(s) Oral daily  senna 2 Tablet(s) Oral at bedtime  sodium chloride 0.9%. 1000 milliLiter(s) (75 mL/Hr) IV Continuous <Continuous>    MEDICATIONS  (PRN):  acetaminophen   Tablet 650 milliGRAM(s) Oral every 6 hours PRN For Temp greater than 38 C (100.4 F)  LORazepam     Tablet 1 milliGRAM(s) Oral every 12 hours PRN Agitation  temazepam 30 milliGRAM(s) Oral at bedtime PRN Insomnia      Allergies    No Known Allergies    Intolerances        LABS:                        10.6   10.8  )-----------( 312      ( 2018 07:16 )             34.4     06-02    140  |  106  |  8   ----------------------------<  101<H>  3.9   |  26  |  0.61    Ca    8.4      2018 07:16  Phos  3.0     06-02  Mg     2.3     06-02    TPro  6.3  /  Alb  2.2<L>  /  TBili  0.6  /  DBili  x   /  AST  7<L>  /  ALT  11  /  AlkPhos  51      PT/INR - ( 2018 06:44 )   PT: 13.0 sec;   INR: 1.19 ratio           Urinalysis Basic - ( 2018 00:44 )    Color: Yellow / Appearance: Clear / S.010 / pH: x  Gluc: x / Ketone: Negative  / Bili: Negative / Urobili: Negative   Blood: x / Protein: Negative / Nitrite: Positive   Leuk Esterase: Small / RBC: 2-5 /HPF / WBC 3-5 /HPF   Sq Epi: x / Non Sq Epi: Few /HPF / Bacteria: Many /HPF        RADIOLOGY & ADDITIONAL TESTS:    < from: CT Abdomen and Pelvis w/ IV Cont (18 @ 22:38) >  IMPRESSION:    Markedly stool distended rectum with rectal wall thickening and   perirectal/presacral edema. Findings suggest stercoral colitis.   Evaluation for rectovaginal fistula is nondiagnostic without   administration of rectal contrast.      < end of copied text >    < from: Xray Chest 1 View- PORTABLE-Urgent (18 @ 09:19) >  IMPRESSION:  Clear lungs    < end of copied text > INTERVAL HPI/OVERNIGHT EVENTS: pt seen and examined. pt had 2 bowel movements yesterday    VITAL SIGNS:  T(F): 97.8 (18 @ 06:00)  HR: 82 (18 @ 06:00)  BP: 107/64 (18 @ 06:00)  RR: 16 (18 @ 06:00)  SpO2: 96% (18 @ 06:00)  Wt(kg): --    PHYSICAL EXAM:    Constitutional: NAD  Respiratory: ctab  Cardiovascular: S1, S2 present  Gastrointestinal: non distended, soft , tenderness, bS++  Extremities: no edema  Neurological: alert and awake  Musk: bedbound    MEDICATIONS  (STANDING):  docusate sodium 100 milliGRAM(s) Oral three times a day  enoxaparin Injectable 40 milliGRAM(s) SubCutaneous daily  lactulose Syrup 10 Gram(s) Oral three times a day  nystatin Ointment 1 Application(s) Topical two times a day  piperacillin/tazobactam IVPB. 3.375 Gram(s) IV Intermittent every 8 hours  polyethylene glycol 3350 17 Gram(s) Oral daily  risperiDONE   Tablet 1 milliGRAM(s) Oral daily  senna 2 Tablet(s) Oral at bedtime  sodium chloride 0.9%. 1000 milliLiter(s) (75 mL/Hr) IV Continuous <Continuous>    MEDICATIONS  (PRN):  acetaminophen   Tablet 650 milliGRAM(s) Oral every 6 hours PRN For Temp greater than 38 C (100.4 F)  LORazepam     Tablet 1 milliGRAM(s) Oral every 12 hours PRN Agitation  temazepam 30 milliGRAM(s) Oral at bedtime PRN Insomnia      Allergies    No Known Allergies    Intolerances        LABS:                        10.6   10.8  )-----------( 312      ( 2018 07:16 )             34.4     06-02    140  |  106  |  8   ----------------------------<  101<H>  3.9   |  26  |  0.61    Ca    8.4      2018 07:16  Phos  3.0     06-02  Mg     2.3     06-02    TPro  6.3  /  Alb  2.2<L>  /  TBili  0.6  /  DBili  x   /  AST  7<L>  /  ALT  11  /  AlkPhos  51      PT/INR - ( 2018 06:44 )   PT: 13.0 sec;   INR: 1.19 ratio           Urinalysis Basic - ( 2018 00:44 )    Color: Yellow / Appearance: Clear / S.010 / pH: x  Gluc: x / Ketone: Negative  / Bili: Negative / Urobili: Negative   Blood: x / Protein: Negative / Nitrite: Positive   Leuk Esterase: Small / RBC: 2-5 /HPF / WBC 3-5 /HPF   Sq Epi: x / Non Sq Epi: Few /HPF / Bacteria: Many /HPF        RADIOLOGY & ADDITIONAL TESTS:    < from: CT Abdomen and Pelvis w/ IV Cont (18 @ 22:38) >  IMPRESSION:    Markedly stool distended rectum with rectal wall thickening and   perirectal/presacral edema. Findings suggest stercoral colitis.   Evaluation for rectovaginal fistula is nondiagnostic without   administration of rectal contrast.      < end of copied text >    < from: Xray Chest 1 View- PORTABLE-Urgent (18 @ 09:19) >  IMPRESSION:  Clear lungs    < end of copied text >

## 2018-06-02 NOTE — PROGRESS NOTE ADULT - ATTENDING COMMENTS
Patient was seen and examined by myself. Case was discussed with house staff in details. I have reviewed and agree with the plan as outlined above with edits where appropriate.  See attending addendum

## 2018-06-03 PROCEDURE — 99232 SBSQ HOSP IP/OBS MODERATE 35: CPT

## 2018-06-03 PROCEDURE — 74177 CT ABD & PELVIS W/CONTRAST: CPT | Mod: 26

## 2018-06-03 RX ORDER — ACETAMINOPHEN 500 MG
650 TABLET ORAL EVERY 6 HOURS
Qty: 0 | Refills: 0 | Status: DISCONTINUED | OUTPATIENT
Start: 2018-06-03 | End: 2018-06-06

## 2018-06-03 RX ORDER — TRAMADOL HYDROCHLORIDE 50 MG/1
25 TABLET ORAL
Qty: 0 | Refills: 0 | Status: DISCONTINUED | OUTPATIENT
Start: 2018-06-03 | End: 2018-06-06

## 2018-06-03 RX ADMIN — RISPERIDONE 1 MILLIGRAM(S): 4 TABLET ORAL at 09:31

## 2018-06-03 RX ADMIN — TRAMADOL HYDROCHLORIDE 25 MILLIGRAM(S): 50 TABLET ORAL at 10:32

## 2018-06-03 RX ADMIN — LACTULOSE 10 GRAM(S): 10 SOLUTION ORAL at 13:04

## 2018-06-03 RX ADMIN — Medication 100 MILLIGRAM(S): at 05:31

## 2018-06-03 RX ADMIN — Medication 1 MILLIGRAM(S): at 12:06

## 2018-06-03 RX ADMIN — NYSTATIN CREAM 1 APPLICATION(S): 100000 CREAM TOPICAL at 05:31

## 2018-06-03 RX ADMIN — LACTULOSE 10 GRAM(S): 10 SOLUTION ORAL at 21:04

## 2018-06-03 RX ADMIN — SENNA PLUS 2 TABLET(S): 8.6 TABLET ORAL at 21:04

## 2018-06-03 RX ADMIN — Medication 100 MILLIGRAM(S): at 21:03

## 2018-06-03 RX ADMIN — POLYETHYLENE GLYCOL 3350 17 GRAM(S): 17 POWDER, FOR SOLUTION ORAL at 11:23

## 2018-06-03 RX ADMIN — CEFTRIAXONE 100 GRAM(S): 500 INJECTION, POWDER, FOR SOLUTION INTRAMUSCULAR; INTRAVENOUS at 05:31

## 2018-06-03 RX ADMIN — TRAMADOL HYDROCHLORIDE 25 MILLIGRAM(S): 50 TABLET ORAL at 11:30

## 2018-06-03 RX ADMIN — TEMAZEPAM 30 MILLIGRAM(S): 15 CAPSULE ORAL at 21:03

## 2018-06-03 RX ADMIN — NYSTATIN CREAM 1 APPLICATION(S): 100000 CREAM TOPICAL at 17:03

## 2018-06-03 RX ADMIN — Medication 100 MILLIGRAM(S): at 13:04

## 2018-06-03 NOTE — DIETITIAN INITIAL EVALUATION ADULT. - PROBLEM SELECTOR PLAN 5
dysphagia diet  nonverbal , bedbound  c/w ativan PRN, Risperdal, temazepam hs PRN for insomnia  patient is on home hospice  aspiration and fall risk  DNR/DNI

## 2018-06-03 NOTE — DIETITIAN INITIAL EVALUATION ADULT. - PROBLEM SELECTOR PLAN 3
plan as above  on exam, no pus or vaginal discharge noted  zosyn to cover for GI /  organisms  f/u cultures

## 2018-06-03 NOTE — DIETITIAN INITIAL EVALUATION ADULT. - PERTINENT LABORATORY DATA
06-02 Na140 mmol/L Glu 101 mg/dL<H> K+ 3.9 mmol/L Cr  0.61 mg/dL BUN 8 mg/dL 06-02 Phos 3.0 mg/dL 06-01 Alb 2.2 g/dL<L> 06-01 [id number] 5.9 %<H>

## 2018-06-03 NOTE — DIETITIAN INITIAL EVALUATION ADULT. - NS AS NUTRI INTERV MEALS SNACK
consider swallow evaluation to determine adequacy of diet therapy/Texture-modified diet/Fluid - modified diet

## 2018-06-03 NOTE — DIETITIAN INITIAL EVALUATION ADULT. - MD RECOMMEND
po supplement/consider swallow evaluation to determine adequacy of diet therapy , in the interim, add ensure pudding bid ,

## 2018-06-03 NOTE — CHART NOTE - NSCHARTNOTEFT_GEN_A_CORE
Patient's CT scan of abdomen was inconclusive with regards to diagnosing fistula due to lack of contrast - unable to pass stool within colon.  Primary team, please re-order test if clinically indicated.

## 2018-06-03 NOTE — PROGRESS NOTE ADULT - SUBJECTIVE AND OBJECTIVE BOX
MEDICAL ATTENDING NOTE    Patient is a 82y old  Female who presents with a chief complaint of dark vaginal discharge (01 Jun 2018 01:11)      INTERVAL HPI/ OVERNIGHT EVENTS: no new complaints, pt was seen and examined at bedside with her HA around, pt couldnt tolerate the whole oral contrast, no acute event over night.    MEDICATIONS  (STANDING):  docusate sodium 100 milliGRAM(s) Oral three times a day  enoxaparin Injectable 40 milliGRAM(s) SubCutaneous daily  lactulose Syrup 10 Gram(s) Oral three times a day  nystatin Ointment 1 Application(s) Topical two times a day  piperacillin/tazobactam IVPB. 3.375 Gram(s) IV Intermittent every 8 hours  polyethylene glycol 3350 17 Gram(s) Oral daily  risperiDONE   Tablet 1 milliGRAM(s) Oral daily  senna 2 Tablet(s) Oral at bedtime  sodium chloride 0.9%. 1000 milliLiter(s) (75 mL/Hr) IV Continuous <Continuous>    MEDICATIONS  (PRN):  acetaminophen   Tablet 650 milliGRAM(s) Oral every 6 hours PRN For Temp greater than 38 C (100.4 F)  LORazepam     Tablet 1 milliGRAM(s) Oral every 12 hours PRN Agitation  temazepam 30 milliGRAM(s) Oral at bedtime PRN Insomnia      __________________________________________________  ----------------------------------------------------------------------------------  REVIEW OF SYSTEMS: no fever and no SOB noted; patient is demented and speak [place]ian only     Vital signs: reviewed.      _________________  PHYSICAL EXAM:  ---------------------------   NAD; Normocephalic;   LUNGS - no wheezing; clear to auscultation  HEART: S1 S2+   ABDOMEN: Soft, Nontender, non distended; BS+  EXTREMITIES: no cyanosis; no edema  NERVOUS SYSTEM:   no new deficits

## 2018-06-03 NOTE — PROGRESS NOTE ADULT - ASSESSMENT
Patient is a 82F from home, lives with son, has HHA, nonverbal, bedbound x 3 weeks, on home hospice, DNR/DNI, with PMH dementia (worsening over past 6 mo) brought in by daughter in law Carine Younger [phone] as recommended by hospice MD due to leukocytosis, unknown number. Admitted for sepsis 2/2 stercoral colitis, r/o rectovaginal fistula.    1:  Problem: Colitis.  Plan: Continue with antibiotics  need Repeat CT abd/pelvis with rectal contrast to assess for rectovaginal fistula. pt can not tolerate the oral contrast. will discuss with radiology the options.  c/w tylenol and tramadol     2: Problem: Constipation.  Plan: continue bowel regimen.: improved      3:Problem: Dementia with behavioral disturbance, unspecified dementia type.  Plan: supportive measures  Continue with Risperdal  PRN ativan for agitated behavior.    4: Problem: Essential hypertension.  Plan: BP stable  Monitor.      5: Problem: UTI (urinary tract infection).  Plan: continue antibiotics.   6:DVT prophylaxis with Lovenox

## 2018-06-03 NOTE — DIETITIAN INITIAL EVALUATION ADULT. - PROBLEM SELECTOR PLAN 2
meets sepsis criteria: tachycardic, wbc 12, source of infection  will treat for colitis  3 weeks dark vaginal discharge mixed with pus and feces  received cipro as outpatient without improvement  on admission wbc 12, afebrile  lactate normal  on exam: suprapubic tenderness  CT abd/pelvis: Markedly stool distended rectum with rectal wall thickening and   perirectal/presacral edema. Findings suggest stercoral colitis. Evaluation for rectovaginal fistula is nondiagnostic without administration of rectal contrast  house officer for surgery was consulted by ED attending, no acute intervention needed at this time, will consult surgery in AM  zosyn to cover for / GI pathology  UA + nitrites and LE, neg WBCs  f/u blood culture and urine culture  s/p 1L bolus  c/w maintenance fluids  fingerstick q 6 hours

## 2018-06-04 LAB
ANION GAP SERPL CALC-SCNC: 8 MMOL/L — SIGNIFICANT CHANGE UP (ref 5–17)
BUN SERPL-MCNC: 8 MG/DL — SIGNIFICANT CHANGE UP (ref 7–18)
CALCIUM SERPL-MCNC: 8.6 MG/DL — SIGNIFICANT CHANGE UP (ref 8.4–10.5)
CHLORIDE SERPL-SCNC: 101 MMOL/L — SIGNIFICANT CHANGE UP (ref 96–108)
CO2 SERPL-SCNC: 28 MMOL/L — SIGNIFICANT CHANGE UP (ref 22–31)
CREAT SERPL-MCNC: 0.56 MG/DL — SIGNIFICANT CHANGE UP (ref 0.5–1.3)
GLUCOSE SERPL-MCNC: 99 MG/DL — SIGNIFICANT CHANGE UP (ref 70–99)
HCT VFR BLD CALC: 37 % — SIGNIFICANT CHANGE UP (ref 34.5–45)
HGB BLD-MCNC: 11.5 G/DL — SIGNIFICANT CHANGE UP (ref 11.5–15.5)
MAGNESIUM SERPL-MCNC: 2.4 MG/DL — SIGNIFICANT CHANGE UP (ref 1.6–2.6)
MCHC RBC-ENTMCNC: 26.4 PG — LOW (ref 27–34)
MCHC RBC-ENTMCNC: 31 GM/DL — LOW (ref 32–36)
MCV RBC AUTO: 85.1 FL — SIGNIFICANT CHANGE UP (ref 80–100)
PHOSPHATE SERPL-MCNC: 3.7 MG/DL — SIGNIFICANT CHANGE UP (ref 2.5–4.5)
PLATELET # BLD AUTO: 378 K/UL — SIGNIFICANT CHANGE UP (ref 150–400)
POTASSIUM SERPL-MCNC: 4.2 MMOL/L — SIGNIFICANT CHANGE UP (ref 3.5–5.3)
POTASSIUM SERPL-SCNC: 4.2 MMOL/L — SIGNIFICANT CHANGE UP (ref 3.5–5.3)
RBC # BLD: 4.34 M/UL — SIGNIFICANT CHANGE UP (ref 3.8–5.2)
RBC # FLD: 12.9 % — SIGNIFICANT CHANGE UP (ref 10.3–14.5)
SODIUM SERPL-SCNC: 137 MMOL/L — SIGNIFICANT CHANGE UP (ref 135–145)
WBC # BLD: 7.7 K/UL — SIGNIFICANT CHANGE UP (ref 3.8–10.5)
WBC # FLD AUTO: 7.7 K/UL — SIGNIFICANT CHANGE UP (ref 3.8–10.5)

## 2018-06-04 PROCEDURE — 99233 SBSQ HOSP IP/OBS HIGH 50: CPT | Mod: GC

## 2018-06-04 RX ORDER — RISPERIDONE 4 MG/1
1 TABLET ORAL AT BEDTIME
Qty: 0 | Refills: 0 | Status: DISCONTINUED | OUTPATIENT
Start: 2018-06-05 | End: 2018-06-05

## 2018-06-04 RX ORDER — SODIUM CHLORIDE 9 MG/ML
1000 INJECTION INTRAMUSCULAR; INTRAVENOUS; SUBCUTANEOUS
Qty: 0 | Refills: 0 | Status: DISCONTINUED | OUTPATIENT
Start: 2018-06-04 | End: 2018-06-06

## 2018-06-04 RX ORDER — SODIUM CHLORIDE 9 MG/ML
500 INJECTION INTRAMUSCULAR; INTRAVENOUS; SUBCUTANEOUS ONCE
Qty: 0 | Refills: 0 | Status: COMPLETED | OUTPATIENT
Start: 2018-06-04 | End: 2018-06-04

## 2018-06-04 RX ORDER — MULTIVIT WITH MIN/MFOLATE/K2 340-15/3 G
300 POWDER (GRAM) ORAL ONCE
Qty: 0 | Refills: 0 | Status: COMPLETED | OUTPATIENT
Start: 2018-06-04 | End: 2018-06-04

## 2018-06-04 RX ADMIN — LACTULOSE 10 GRAM(S): 10 SOLUTION ORAL at 13:35

## 2018-06-04 RX ADMIN — TRAMADOL HYDROCHLORIDE 25 MILLIGRAM(S): 50 TABLET ORAL at 10:42

## 2018-06-04 RX ADMIN — TRAMADOL HYDROCHLORIDE 25 MILLIGRAM(S): 50 TABLET ORAL at 11:18

## 2018-06-04 RX ADMIN — Medication 100 MILLIGRAM(S): at 13:35

## 2018-06-04 RX ADMIN — TEMAZEPAM 30 MILLIGRAM(S): 15 CAPSULE ORAL at 21:17

## 2018-06-04 RX ADMIN — ENOXAPARIN SODIUM 40 MILLIGRAM(S): 100 INJECTION SUBCUTANEOUS at 11:19

## 2018-06-04 RX ADMIN — TRAMADOL HYDROCHLORIDE 25 MILLIGRAM(S): 50 TABLET ORAL at 16:01

## 2018-06-04 RX ADMIN — Medication 300 MILLILITER(S): at 11:59

## 2018-06-04 RX ADMIN — SODIUM CHLORIDE 500 MILLILITER(S): 9 INJECTION INTRAMUSCULAR; INTRAVENOUS; SUBCUTANEOUS at 18:48

## 2018-06-04 RX ADMIN — SODIUM CHLORIDE 50 MILLILITER(S): 9 INJECTION INTRAMUSCULAR; INTRAVENOUS; SUBCUTANEOUS at 21:17

## 2018-06-04 RX ADMIN — POLYETHYLENE GLYCOL 3350 17 GRAM(S): 17 POWDER, FOR SOLUTION ORAL at 11:19

## 2018-06-04 RX ADMIN — CEFTRIAXONE 100 GRAM(S): 500 INJECTION, POWDER, FOR SOLUTION INTRAMUSCULAR; INTRAVENOUS at 05:05

## 2018-06-04 RX ADMIN — NYSTATIN CREAM 1 APPLICATION(S): 100000 CREAM TOPICAL at 17:23

## 2018-06-04 RX ADMIN — NYSTATIN CREAM 1 APPLICATION(S): 100000 CREAM TOPICAL at 05:05

## 2018-06-04 RX ADMIN — Medication 100 MILLIGRAM(S): at 06:05

## 2018-06-04 RX ADMIN — RISPERIDONE 1 MILLIGRAM(S): 4 TABLET ORAL at 11:19

## 2018-06-04 RX ADMIN — LACTULOSE 10 GRAM(S): 10 SOLUTION ORAL at 05:05

## 2018-06-04 RX ADMIN — Medication 100 MILLIGRAM(S): at 21:17

## 2018-06-04 RX ADMIN — Medication 650 MILLIGRAM(S): at 13:35

## 2018-06-04 RX ADMIN — Medication 650 MILLIGRAM(S): at 15:24

## 2018-06-04 RX ADMIN — TRAMADOL HYDROCHLORIDE 25 MILLIGRAM(S): 50 TABLET ORAL at 17:23

## 2018-06-04 NOTE — PROGRESS NOTE ADULT - ATTENDING COMMENTS
Attending Medicine Covering Dr. Alvarado    Patient seen and examined earlier today 12.45 PM; Agree with PGY 3 A/P above with editing as needed. Discussed with Dr. Larsen    Patient doing okay; Non verbal; unable to offer complaints; Has BMs; No abdominal pain on palpation; No vomiting    D/D:  Stercoral Colitis due to fecal Impaction  Continue Rocephin and Flagyl  Laxatives and stool softeners  On discharge sshould continue Colace and Senna routine with Miralax every 2 days if no BM d/w daughter    Concern for Rectovaginal fistula  Repeat CT after Fecal impaction is cleared doubt     Advanced Dementia  D/C Plan back to Home Hospice once acute issues are cleared  Discussed with daughter at bedside and with grand daughter over the phone  Discussed with PGY3 and RN

## 2018-06-04 NOTE — PROGRESS NOTE ADULT - SUBJECTIVE AND OBJECTIVE BOX
INTERVAL HPI/OVERNIGHT EVENTS: pt seen and examined. pt having bowel movements    VITAL SIGNS:  ICU Vital Signs Last 24 Hrs  T(C): 36.6 (04 Jun 2018 14:23), Max: 37.4 (03 Jun 2018 19:04)  T(F): 97.8 (04 Jun 2018 14:23), Max: 99.3 (03 Jun 2018 19:04)  HR: 87 (04 Jun 2018 16:10) (86 - 94)  BP: 101/65 (04 Jun 2018 16:10) (94/64 - 114/68)  BP(mean): --  ABP: --  ABP(mean): --  RR: 18 (04 Jun 2018 14:23) (16 - 18)  SpO2: 97% (04 Jun 2018 14:23) (95% - 98%)      PHYSICAL EXAM:    Constitutional: NAD  Respiratory: ctab  Cardiovascular: S1, S2 present  Gastrointestinal: mildly distended, mild tenderness, bS++  Extremities: no edema  Neurological: alert and awake  Musk: bedbound    MEDICATIONS  (STANDING):  docusate sodium 100 milliGRAM(s) Oral three times a day  enoxaparin Injectable 40 milliGRAM(s) SubCutaneous daily  lactulose Syrup 10 Gram(s) Oral three times a day  nystatin Ointment 1 Application(s) Topical two times a day  piperacillin/tazobactam IVPB. 3.375 Gram(s) IV Intermittent every 8 hours  polyethylene glycol 3350 17 Gram(s) Oral daily  risperiDONE   Tablet 1 milliGRAM(s) Oral daily  senna 2 Tablet(s) Oral at bedtime  sodium chloride 0.9%. 1000 milliLiter(s) (75 mL/Hr) IV Continuous <Continuous>    MEDICATIONS  (PRN):  acetaminophen   Tablet 650 milliGRAM(s) Oral every 6 hours PRN For Temp greater than 38 C (100.4 F)  LORazepam     Tablet 1 milliGRAM(s) Oral every 12 hours PRN Agitation  temazepam 30 milliGRAM(s) Oral at bedtime PRN Insomnia      Allergies    No Known Allergies    Intolerances        LABS:                                   11.5   7.7   )-----------( 378      ( 04 Jun 2018 08:22 )             37.0   06-04    137  |  101  |  8   ----------------------------<  99  4.2   |  28  |  0.56    Ca    8.6      04 Jun 2018 08:22  Phos  3.7     06-04  Mg     2.4     06-04            RADIOLOGY & ADDITIONAL TESTS:    < from: CT Abdomen and Pelvis w/ Oral Cont and w/ IV Cont (06.03.18 @ 16:57) >  IMPRESSION:    Oral contrast has only reached the right colon. The colon is filled with   a large amount of stool. The rectum is markedly distended with a   thickened wall and surrounding inflammatory changes and lymph nodes   suggesting stercoral colitis. The uterus demonstrates air within the   endometrium which is concerning for fistula, however this cannot be   evaluated at this time in that oral contrast has not reached this area.   This would be better evaluated following evacuation of stool and with   rectal administration of contrast or vaginogram.     Small bilateral effusions new since the previous exam.    Thickening of the distal esophagus and cardia of the stomach. Correlate   clinically for pathological processes.    < end of copied text >

## 2018-06-04 NOTE — PROGRESS NOTE ADULT - ASSESSMENT
Patient is a 82F from home, lives with son, has HHA, nonverbal, bedbound x 3 weeks, on home hospice, DNR/DNI, with PMH dementia (worsening over past 6 mo) brought in by daughter in law Carine Younger [phone] as recommended by hospice MD due to leukocytosis, unknown number. Admitted for sepsis 2/2 stercoral colitis, r/o rectovaginal fistula.     Problem/Plan - 1:  ·  Problem: Colitis.  Plan: Stercoral colitis - continue with Zosyn  iv fluids  f/u cultures.   pt having bowel movements yesterday with multiple laxatives, will continue to clear remaining stool from colon  Repeat CT Abd/pelvis with oral and iv contrast to r/o rectovaginal fistula done yesterday but inconclusive as contrast reached only upto colona nd colon filled with stool and rectum edema and inflammation as above, need more stool evacuation.  will repeat ct abd with oral contrast in a day or two after more bowel movements  Family not sure if want to pursue surgery if fistual is found  Discussed plan with Attedning and family     Problem/Plan - 2:  ·  Problem: Sepsis.  Plan:   will treat for stercocolitis  WBC improving 10 today  3 weeks dark vaginal discharge mixed with pus and feces  failed cipro as outpatient   CT abd/pelvis: Markedly stool distended rectum with rectal wall thickening and perirectal/presacral edema. Findings suggest stercoral colitis. Evaluation for rectovaginal fistula is nondiagnostic without administration of rectal contrast  c/w rocephin, flagyl to cover for / GI pathology  blood culture and urine culture neg  BP improved       Problem/Plan - 3:  ·  Problem: Vaginal discharge.  Plan: plan as above       Problem/Plan - 4:  ·  Problem: Constipation.  Plan: c/w lactulose, colace, senna, miralax. 1 dose of mg citrate given     Problem/Plan - 5:  ·  Problem: Dementia with behavioral disturbance, unspecified dementia type.  Plan: dysphagia diet  nonverbal , bedbound  c/w ativan PRN, Risperdal, temazepam hs PRN for insomnia  patient is on home hospice  aspiration and fall risk  DNR/DNI.      Problem/Plan - 6:  Problem: Need for prophylactic measure. Plan: IMPROVE 2, c/w lovenox for dvt ppx

## 2018-06-04 NOTE — CHART NOTE - NSCHARTNOTEFT_GEN_A_CORE
Rapid Response PGY 2/ PGY 3 Note  Patient is a 82y old  Female Patient is a on home hospice, DNR/DNI, with PMH dementia admitted for sepsis 2/2 stercoral colitis. Rapid response team called because patient had a near syncope episode.     Patient was seen and examined at the bedside by the rapid response team.    Allergies    No Known Allergies    Intolerances        PAST MEDICAL & SURGICAL HISTORY:  Urticaria  Dementia with behavioral disturbance, unspecified dementia type  Essential hypertension  No significant past surgical history      Vital Signs Last 24 Hrs  T(C): 36.6 (04 Jun 2018 14:23), Max: 36.9 (03 Jun 2018 20:55)  T(F): 97.8 (04 Jun 2018 14:23), Max: 98.4 (03 Jun 2018 20:55)  HR: 87 (04 Jun 2018 16:10) (86 - 94)  BP: 101/65 (04 Jun 2018 16:10) (94/64 - 114/68)  BP(mean): --  RR: 18 (04 Jun 2018 14:23) (16 - 18)  SpO2: 97% (04 Jun 2018 14:23) (95% - 98%)          GENERAL: The patient is awake, not alert and is crying (per daughter that's her baseline)  HEENT: Head is normocephalic and atraumatic. Extraocular muscles are intact. Mucous membranes are dry.   NECK: Supple.  LUNGS: Clear to auscultation BL without wheezing, rales or rhonchi; respirations unlabored  HEART: Regular rate and rhythm ,+S1/+S2, no murmurs, rubs, gallops  ABDOMEN: Soft, possible tenderness since she cries when palpated. feeble bowel sounds.   EXTREMITIES: Without any cyanosis, clubbing, rash, lesions or edema.  SKIN: No new rashes or lesions.  MSK: strength equal BL  VASCULAR: Radial and Dorsal pedal pulses palpable BL  NEUROLOGIC: severe dementia       06-03 @ 07:01  -  06-04 @ 07:00  --------------------------------------------------------  IN: 0 mL / OUT: 3 mL / NET: -3 mL    06-04 @ 07:01 - 06-04 @ 19:13  --------------------------------------------------------  IN: 0 mL / OUT: 1 mL / NET: -1 mL                              11.5   7.7   )-----------( 378      ( 04 Jun 2018 08:22 )             37.0     06-04    137  |  101  |  8   ----------------------------<  99  4.2   |  28  |  0.56    Ca    8.6      04 Jun 2018 08:22  Phos  3.7     06-04  Mg     2.4     06-04                    Vital Signs Last 24 Hrs*       Plan-    Patient was walking with the daughter and when they tried to sit her, she became pale and was not responding. No loss of consciousness or seizure like activity. Patient's eyes were open and she was staring at the daughter. The episode lasted for 1-2 seconds. Vitals stable except for borderline BP. glucose 126 and afebrile.     - possible vasovagal and some orthostasis since patient has limited po intake given stercoral colitis   - will give 500ml bolus and place on maintenance fluids   - will get ECG to r/o arrythmia   - labs normal in the am; will not repeat   - No new focal deficits, no need for imaging   - fall precautions     Discussed with covering attending Dr Crisostomo and agrees with the plan. Rapid Response PGY 3 Note  Patient is a 82y old  Female Patient is a on home hospice, DNR/DNI, with PMH dementia admitted for sepsis 2/2 stercoral colitis. Rapid response team called because patient had a near syncope episode.     Patient was seen and examined at the bedside by the rapid response team.    Allergies    No Known Allergies    Intolerances        PAST MEDICAL & SURGICAL HISTORY:  Urticaria  Dementia with behavioral disturbance, unspecified dementia type  Essential hypertension  No significant past surgical history      Vital Signs Last 24 Hrs  T(C): 36.6 (04 Jun 2018 14:23), Max: 36.9 (03 Jun 2018 20:55)  T(F): 97.8 (04 Jun 2018 14:23), Max: 98.4 (03 Jun 2018 20:55)  HR: 87 (04 Jun 2018 16:10) (86 - 94)  BP: 101/65 (04 Jun 2018 16:10) (94/64 - 114/68)  BP(mean): --  RR: 18 (04 Jun 2018 14:23) (16 - 18)  SpO2: 97% (04 Jun 2018 14:23) (95% - 98%)          GENERAL: The patient is awake, not alert and is crying (per daughter that's her baseline)  HEENT: Head is normocephalic and atraumatic. Extraocular muscles are intact. Mucous membranes are dry.   NECK: Supple.  LUNGS: Clear to auscultation BL without wheezing, rales or rhonchi; respirations unlabored  HEART: Regular rate and rhythm ,+S1/+S2, no murmurs, rubs, gallops  ABDOMEN: Soft, possible tenderness since she cries when palpated. feeble bowel sounds.   EXTREMITIES: Without any cyanosis, clubbing, rash, lesions or edema.  SKIN: No new rashes or lesions.  MSK: strength equal BL  VASCULAR: Radial and Dorsal pedal pulses palpable BL  NEUROLOGIC: severe dementia       06-03 @ 07:01  -  06-04 @ 07:00  --------------------------------------------------------  IN: 0 mL / OUT: 3 mL / NET: -3 mL    06-04 @ 07:01  -  06-04 @ 19:13  --------------------------------------------------------  IN: 0 mL / OUT: 1 mL / NET: -1 mL                              11.5   7.7   )-----------( 378      ( 04 Jun 2018 08:22 )             37.0     06-04    137  |  101  |  8   ----------------------------<  99  4.2   |  28  |  0.56    Ca    8.6      04 Jun 2018 08:22  Phos  3.7     06-04  Mg     2.4     06-04                    Vital Signs Last 24 Hrs*       Plan-    Patient was walking with the daughter and when they tried to sit her, she became pale and was not responding. No loss of consciousness or seizure like activity. Patient's eyes were open and she was staring at the daughter. The episode lasted for 1-2 seconds. Vitals stable except for borderline BP. glucose 126 and afebrile.     - possible vasovagal and some orthostasis since patient has limited po intake given stercoral colitis   - will give 500ml bolus and place on maintenance fluids   - ECG: not interpretable since patient was shaking but NSR, QTc 492.   - labs normal in the am; will not repeat   - No new focal deficits, no need for imaging   - fall precautions     Discussed with covering attending Dr Crisostomo and agrees with the plan.

## 2018-06-05 LAB
ANION GAP SERPL CALC-SCNC: 5 MMOL/L — SIGNIFICANT CHANGE UP (ref 5–17)
BUN SERPL-MCNC: 11 MG/DL — SIGNIFICANT CHANGE UP (ref 7–18)
CALCIUM SERPL-MCNC: 8.4 MG/DL — SIGNIFICANT CHANGE UP (ref 8.4–10.5)
CHLORIDE SERPL-SCNC: 105 MMOL/L — SIGNIFICANT CHANGE UP (ref 96–108)
CO2 SERPL-SCNC: 29 MMOL/L — SIGNIFICANT CHANGE UP (ref 22–31)
CREAT SERPL-MCNC: 0.52 MG/DL — SIGNIFICANT CHANGE UP (ref 0.5–1.3)
GLUCOSE SERPL-MCNC: 108 MG/DL — HIGH (ref 70–99)
HCT VFR BLD CALC: 34.5 % — SIGNIFICANT CHANGE UP (ref 34.5–45)
HGB BLD-MCNC: 11 G/DL — LOW (ref 11.5–15.5)
INR BLD: 1.18 RATIO — HIGH (ref 0.88–1.16)
MAGNESIUM SERPL-MCNC: 2.5 MG/DL — SIGNIFICANT CHANGE UP (ref 1.6–2.6)
MCHC RBC-ENTMCNC: 26.8 PG — LOW (ref 27–34)
MCHC RBC-ENTMCNC: 31.9 GM/DL — LOW (ref 32–36)
MCV RBC AUTO: 83.9 FL — SIGNIFICANT CHANGE UP (ref 80–100)
PLATELET # BLD AUTO: 391 K/UL — SIGNIFICANT CHANGE UP (ref 150–400)
POTASSIUM SERPL-MCNC: 3.9 MMOL/L — SIGNIFICANT CHANGE UP (ref 3.5–5.3)
POTASSIUM SERPL-SCNC: 3.9 MMOL/L — SIGNIFICANT CHANGE UP (ref 3.5–5.3)
PROTHROM AB SERPL-ACNC: 12.9 SEC — HIGH (ref 9.8–12.7)
RBC # BLD: 4.12 M/UL — SIGNIFICANT CHANGE UP (ref 3.8–5.2)
RBC # FLD: 12.4 % — SIGNIFICANT CHANGE UP (ref 10.3–14.5)
SODIUM SERPL-SCNC: 139 MMOL/L — SIGNIFICANT CHANGE UP (ref 135–145)
WBC # BLD: 7.8 K/UL — SIGNIFICANT CHANGE UP (ref 3.8–10.5)
WBC # FLD AUTO: 7.8 K/UL — SIGNIFICANT CHANGE UP (ref 3.8–10.5)

## 2018-06-05 PROCEDURE — 99233 SBSQ HOSP IP/OBS HIGH 50: CPT | Mod: GC

## 2018-06-05 RX ORDER — ACETAMINOPHEN 500 MG
1000 TABLET ORAL ONCE
Qty: 0 | Refills: 0 | Status: COMPLETED | OUTPATIENT
Start: 2018-06-05 | End: 2018-06-05

## 2018-06-05 RX ORDER — RISPERIDONE 4 MG/1
1 TABLET ORAL DAILY
Qty: 0 | Refills: 0 | Status: DISCONTINUED | OUTPATIENT
Start: 2018-06-06 | End: 2018-06-06

## 2018-06-05 RX ORDER — RISPERIDONE 4 MG/1
1 TABLET ORAL ONCE
Qty: 0 | Refills: 0 | Status: COMPLETED | OUTPATIENT
Start: 2018-06-05 | End: 2018-06-05

## 2018-06-05 RX ADMIN — TRAMADOL HYDROCHLORIDE 25 MILLIGRAM(S): 50 TABLET ORAL at 12:00

## 2018-06-05 RX ADMIN — NYSTATIN CREAM 1 APPLICATION(S): 100000 CREAM TOPICAL at 05:26

## 2018-06-05 RX ADMIN — Medication 100 MILLIGRAM(S): at 21:26

## 2018-06-05 RX ADMIN — CEFTRIAXONE 100 GRAM(S): 500 INJECTION, POWDER, FOR SOLUTION INTRAMUSCULAR; INTRAVENOUS at 05:25

## 2018-06-05 RX ADMIN — Medication 400 MILLIGRAM(S): at 16:06

## 2018-06-05 RX ADMIN — LACTULOSE 10 GRAM(S): 10 SOLUTION ORAL at 05:26

## 2018-06-05 RX ADMIN — TEMAZEPAM 30 MILLIGRAM(S): 15 CAPSULE ORAL at 21:26

## 2018-06-05 RX ADMIN — TRAMADOL HYDROCHLORIDE 25 MILLIGRAM(S): 50 TABLET ORAL at 21:18

## 2018-06-05 RX ADMIN — Medication 1 MILLIGRAM(S): at 12:16

## 2018-06-05 RX ADMIN — NYSTATIN CREAM 1 APPLICATION(S): 100000 CREAM TOPICAL at 19:31

## 2018-06-05 RX ADMIN — Medication 100 MILLIGRAM(S): at 14:30

## 2018-06-05 RX ADMIN — ENOXAPARIN SODIUM 40 MILLIGRAM(S): 100 INJECTION SUBCUTANEOUS at 15:07

## 2018-06-05 RX ADMIN — TRAMADOL HYDROCHLORIDE 25 MILLIGRAM(S): 50 TABLET ORAL at 20:39

## 2018-06-05 RX ADMIN — TRAMADOL HYDROCHLORIDE 25 MILLIGRAM(S): 50 TABLET ORAL at 11:13

## 2018-06-05 RX ADMIN — Medication 100 MILLIGRAM(S): at 05:25

## 2018-06-05 NOTE — PROGRESS NOTE ADULT - SUBJECTIVE AND OBJECTIVE BOX
INTERVAL HPI/OVERNIGHT EVENTS: pt seen and examined. pt having bowel movements    VITAL SIGNS:  Vital Signs Last 24 Hrs  T(C): 36.7 (05 Jun 2018 06:00), Max: 36.8 (04 Jun 2018 20:57)  T(F): 98 (05 Jun 2018 06:00), Max: 98.3 (04 Jun 2018 20:57)  HR: 96 (05 Jun 2018 08:42) (87 - 96)  BP: 101/64 (05 Jun 2018 08:42) (94/64 - 101/614)  BP(mean): --  RR: 18 (05 Jun 2018 06:00) (18 - 18)  SpO2: 97% (05 Jun 2018 06:00) (95% - 97%)      PHYSICAL EXAM:    Constitutional: NAD  Respiratory: ctab  Cardiovascular: S1, S2 present  Gastrointestinal: mildly distended, no tenderness, bS++  Extremities: no edema  Neurological: alert and awake  Musk: bedbound    MEDICATIONS  (STANDING):  docusate sodium 100 milliGRAM(s) Oral three times a day  enoxaparin Injectable 40 milliGRAM(s) SubCutaneous daily  lactulose Syrup 10 Gram(s) Oral three times a day  nystatin Ointment 1 Application(s) Topical two times a day  piperacillin/tazobactam IVPB. 3.375 Gram(s) IV Intermittent every 8 hours  polyethylene glycol 3350 17 Gram(s) Oral daily  risperiDONE   Tablet 1 milliGRAM(s) Oral daily  senna 2 Tablet(s) Oral at bedtime  sodium chloride 0.9%. 1000 milliLiter(s) (75 mL/Hr) IV Continuous <Continuous>    MEDICATIONS  (PRN):  acetaminophen   Tablet 650 milliGRAM(s) Oral every 6 hours PRN For Temp greater than 38 C (100.4 F)  LORazepam     Tablet 1 milliGRAM(s) Oral every 12 hours PRN Agitation  temazepam 30 milliGRAM(s) Oral at bedtime PRN Insomnia      Allergies    No Known Allergies    Intolerances        LABS:                                              11.0   7.8   )-----------( 391      ( 05 Jun 2018 08:26 )             34.5   06-05    139  |  105  |  11  ----------------------------<  108<H>  3.9   |  29  |  0.52    Ca    8.4      05 Jun 2018 08:26  Phos  3.7     06-04  Mg     2.5     06-05        RADIOLOGY & ADDITIONAL TESTS:    < from: CT Abdomen and Pelvis w/ Oral Cont and w/ IV Cont (06.03.18 @ 16:57) >  IMPRESSION:    Oral contrast has only reached the right colon. The colon is filled with   a large amount of stool. The rectum is markedly distended with a   thickened wall and surrounding inflammatory changes and lymph nodes   suggesting stercoral colitis. The uterus demonstrates air within the   endometrium which is concerning for fistula, however this cannot be   evaluated at this time in that oral contrast has not reached this area.   This would be better evaluated following evacuation of stool and with   rectal administration of contrast or vaginogram.     Small bilateral effusions new since the previous exam.    Thickening of the distal esophagus and cardia of the stomach. Correlate   clinically for pathological processes.    < end of copied text > INTERVAL HPI/OVERNIGHT EVENTS: pt seen and examined. pt having bowel movements. pt is agitated this morning and spitting out her pills. She had RRT last evening for vasovagal near syncope after walking and sitting in chair.    VITAL SIGNS:  Vital Signs Last 24 Hrs  T(C): 36.7 (05 Jun 2018 06:00), Max: 36.8 (04 Jun 2018 20:57)  T(F): 98 (05 Jun 2018 06:00), Max: 98.3 (04 Jun 2018 20:57)  HR: 96 (05 Jun 2018 08:42) (87 - 96)  BP: 101/64 (05 Jun 2018 08:42) (94/64 - 101/614)  BP(mean): --  RR: 18 (05 Jun 2018 06:00) (18 - 18)  SpO2: 97% (05 Jun 2018 06:00) (95% - 97%)      PHYSICAL EXAM:    Constitutional: NAD  Respiratory: ctab  Cardiovascular: S1, S2 present  Gastrointestinal: mildly distended, no tenderness, bS++  Extremities: no edema  Neurological: alert and awake  Musk: bedbound    MEDICATIONS  (STANDING):  docusate sodium 100 milliGRAM(s) Oral three times a day  enoxaparin Injectable 40 milliGRAM(s) SubCutaneous daily  lactulose Syrup 10 Gram(s) Oral three times a day  nystatin Ointment 1 Application(s) Topical two times a day  piperacillin/tazobactam IVPB. 3.375 Gram(s) IV Intermittent every 8 hours  polyethylene glycol 3350 17 Gram(s) Oral daily  risperiDONE   Tablet 1 milliGRAM(s) Oral daily  senna 2 Tablet(s) Oral at bedtime  sodium chloride 0.9%. 1000 milliLiter(s) (75 mL/Hr) IV Continuous <Continuous>    MEDICATIONS  (PRN):  acetaminophen   Tablet 650 milliGRAM(s) Oral every 6 hours PRN For Temp greater than 38 C (100.4 F)  LORazepam     Tablet 1 milliGRAM(s) Oral every 12 hours PRN Agitation  temazepam 30 milliGRAM(s) Oral at bedtime PRN Insomnia      Allergies    No Known Allergies    Intolerances        LABS:                                              11.0   7.8   )-----------( 391      ( 05 Jun 2018 08:26 )             34.5   06-05    139  |  105  |  11  ----------------------------<  108<H>  3.9   |  29  |  0.52    Ca    8.4      05 Jun 2018 08:26  Phos  3.7     06-04  Mg     2.5     06-05        RADIOLOGY & ADDITIONAL TESTS:    < from: CT Abdomen and Pelvis w/ Oral Cont and w/ IV Cont (06.03.18 @ 16:57) >  IMPRESSION:    Oral contrast has only reached the right colon. The colon is filled with   a large amount of stool. The rectum is markedly distended with a   thickened wall and surrounding inflammatory changes and lymph nodes   suggesting stercoral colitis. The uterus demonstrates air within the   endometrium which is concerning for fistula, however this cannot be   evaluated at this time in that oral contrast has not reached this area.   This would be better evaluated following evacuation of stool and with   rectal administration of contrast or vaginogram.     Small bilateral effusions new since the previous exam.    Thickening of the distal esophagus and cardia of the stomach. Correlate   clinically for pathological processes.    < end of copied text >

## 2018-06-05 NOTE — CHART NOTE - NSCHARTNOTEFT_GEN_A_CORE
Reassessment: Nutrition consult requested for low Galdino score on 18. Pt. seen by RD & initial assessment done 6/3/18 noted.  82yFemalePatient is a 82y old  Female who presents with a chief complaint of dark vaginal discharge (2018 01:11)      Factors impacting intake: [ ] none [ ] nausea  [ ] vomiting [ ] diarrhea [ ] constipation  [ x]chewing problems [X ] swallowing issues  [X ] other: FTT, Dementia     Diet Presciption: Diet, Dysphagia 1 Pureed-Honey Consistency Fluid:   Supplement Feeding Modality:  Oral  Ensure Enlive Servings Per Day:  1       Frequency:  Three Times a day (18 @ 10:26)    Intake: Visited pt. alert but nonverbal, Grenadian speaking HHA at bedside, spoke with daughter-in-law Carine via phone, stated pt. with poor appetite at home & ongoing also fed with syringe thickened liquids with Ensure Plus mixed in, requesting Ensure Enlive with thickened powder with pt. meal while in hospital, declined feeding tube, encourage safe feeding practices with HHA/daughter, pt. on home hospice PTA. Discussed with MD/RN, add 1 can Ensure Enlive TID with 2 pkts of "Thick Up" powder @TID to Dysphagia Pureed 1 Honey consistency fluids, as medically feasible, RD available     Daily Weight in k.4 (2018 16:37)  Weight in k (2018 01:40)    % Weight Change: 4% since admission    Pertinent Medications: MEDICATIONS  (STANDING):  cefTRIAXone   IVPB 1 Gram(s) IV Intermittent every 24 hours  docusate sodium 100 milliGRAM(s) Oral three times a day  enoxaparin Injectable 40 milliGRAM(s) SubCutaneous daily  lactulose Syrup 10 Gram(s) Oral three times a day  metroNIDAZOLE  IVPB 500 milliGRAM(s) IV Intermittent every 8 hours  nystatin Ointment 1 Application(s) Topical two times a day  polyethylene glycol 3350 17 Gram(s) Oral daily  senna 2 Tablet(s) Oral at bedtime  sodium chloride 0.9%. 1000 milliLiter(s) (50 mL/Hr) IV Continuous <Continuous>    MEDICATIONS  (PRN):  acetaminophen   Tablet 650 milliGRAM(s) Oral every 6 hours PRN For Temp greater than 38 C (100.4 F)  acetaminophen   Tablet. 650 milliGRAM(s) Oral every 6 hours PRN Mild Pain (1 - 3)  LORazepam     Tablet 1 milliGRAM(s) Oral every 12 hours PRN Agitation  temazepam 30 milliGRAM(s) Oral at bedtime PRN Insomnia  traMADol 25 milliGRAM(s) Oral two times a day PRN Moderate Pain (4 - 6)    Pertinent Labs:  Na139 mmol/L Glu 108 mg/dL<H> K+ 3.9 mmol/L Cr  0.52 mg/dL BUN 11 mg/dL  Phos 3.7 mg/dL  Alb 2.2 g/dL<L>  [id number] 5.9 %<H>     CAPILLARY BLOOD GLUCOSE      POCT Blood Glucose.: 108 mg/dL (2018 06:06)  POCT Blood Glucose.: 126 mg/dL (2018 18:39)  POCT Blood Glucose.: 126 mg/dL (2018 17:25)    Skin: Intact    Estimated Needs:   [ X] no change since previous assessment  [ ] recalculated:     Previous Nutrition Diagnosis:   [ ] Inadequate Energy Intake [X ]Inadequate Oral Intake [ ] Excessive Energy Intake   [ ] Underweight [ ] Increased Nutrient Needs [ ] Overweight/Obesity   [ ] Altered GI Function [ ] Unintended Weight Loss [ ] Food & Nutrition Related Knowledge Deficit [ ] Malnutrition     Nutrition Diagnosis is [ X] ongoing  [ ] resolved [ ] not applicable     New Nutrition Diagnosis: [ ] not applicable       Interventions: To meet nutrition needs   Recommend  [ ] Change Diet To:  [ ] Nutrition Supplement  [ X] Nutrition Support  [ ] Other:     Monitoring and Evaluation:   [X ] PO intake [ x ] Tolerance to diet prescription [ x ] weights [ x ] labs[ x ] follow up per protocol  [X ] other: Reassessment: Nutrition consult requested for low Galdino score on 18. Pt. seen by RD & initial assessment done 6/3/18 noted.  82yFemalePatient is a 82y old  Female who presents with a chief complaint of dark vaginal discharge (2018 01:11)      Factors impacting intake: [ ] none [ ] nausea  [ ] vomiting [ ] diarrhea [ ] constipation  [ x]chewing problems [X ] swallowing issues  [X ] other: FTT, Dementia     Diet Presciption: Diet, Dysphagia 1 Pureed-Honey Consistency Fluid:   Supplement Feeding Modality:  Oral  Ensure Enlive Servings Per Day:  1       Frequency:  Three Times a day (18 @ 10:26)    Intake: Visited pt. alert but nonverbal, Argentine speaking HHA at bedside, spoke with daughter-in-law Carine via phone, stated pt. with poor appetite at home & ongoing also fed with syringe thickened liquids with Ensure Plus mixed in, requesting Ensure Enlive with thickened powder with pt. meal while in hospital, declined feeding tube, encourage safe feeding practices with HHA/daughter, had BM yesterday noted, pt. on home hospice PTA. Discussed with MD/RN, add 1 can Ensure Enlive TID with 2 pkts of "Thick Up" powder @TID to Dysphagia Pureed 1 Honey consistency fluids, as medically feasible, RD available     Daily Weight in k.4 (2018 16:37)  Weight in k (2018 01:40)    % Weight Change: 4% since admission    Pertinent Medications: MEDICATIONS  (STANDING):  cefTRIAXone   IVPB 1 Gram(s) IV Intermittent every 24 hours  docusate sodium 100 milliGRAM(s) Oral three times a day  enoxaparin Injectable 40 milliGRAM(s) SubCutaneous daily  lactulose Syrup 10 Gram(s) Oral three times a day  metroNIDAZOLE  IVPB 500 milliGRAM(s) IV Intermittent every 8 hours  nystatin Ointment 1 Application(s) Topical two times a day  polyethylene glycol 3350 17 Gram(s) Oral daily  senna 2 Tablet(s) Oral at bedtime  sodium chloride 0.9%. 1000 milliLiter(s) (50 mL/Hr) IV Continuous <Continuous>    MEDICATIONS  (PRN):  acetaminophen   Tablet 650 milliGRAM(s) Oral every 6 hours PRN For Temp greater than 38 C (100.4 F)  acetaminophen   Tablet. 650 milliGRAM(s) Oral every 6 hours PRN Mild Pain (1 - 3)  LORazepam     Tablet 1 milliGRAM(s) Oral every 12 hours PRN Agitation  temazepam 30 milliGRAM(s) Oral at bedtime PRN Insomnia  traMADol 25 milliGRAM(s) Oral two times a day PRN Moderate Pain (4 - 6)    Pertinent Labs:  Na139 mmol/L Glu 108 mg/dL<H> K+ 3.9 mmol/L Cr  0.52 mg/dL BUN 11 mg/dL  Phos 3.7 mg/dL  Alb 2.2 g/dL<L>  [id number] 5.9 %<H>     CAPILLARY BLOOD GLUCOSE      POCT Blood Glucose.: 108 mg/dL (2018 06:06)  POCT Blood Glucose.: 126 mg/dL (2018 18:39)  POCT Blood Glucose.: 126 mg/dL (2018 17:25)    Skin: Intact    Estimated Needs:   [ X] no change since previous assessment  [ ] recalculated:     Previous Nutrition Diagnosis:   [ ] Inadequate Energy Intake [X ]Inadequate Oral Intake [ ] Excessive Energy Intake   [ ] Underweight [ ] Increased Nutrient Needs [ ] Overweight/Obesity   [ ] Altered GI Function [ ] Unintended Weight Loss [ ] Food & Nutrition Related Knowledge Deficit [ ] Malnutrition     Nutrition Diagnosis is [ X] ongoing  [ ] resolved [ ] not applicable     New Nutrition Diagnosis: [ ] not applicable       Interventions: To meet nutrition needs   Recommend  [ ] Change Diet To:  [ ] Nutrition Supplement  [ X] Nutrition Support  [ ] Other:     Monitoring and Evaluation:   [X ] PO intake [ x ] Tolerance to diet prescription [ x ] weights [ x ] labs[ x ] follow up per protocol  [X ] other: Reassessment: Nutrition consult requested for low Galdino score on 18. Pt. seen by RD & initial assessment done 6/3/18 noted.  82yFemalePatient is a 82y old  Female who presents with a chief complaint of dark vaginal discharge (2018 01:11)      Factors impacting intake: [ ] none [ ] nausea  [ ] vomiting [ ] diarrhea [ ] constipation  [ x]chewing problems [X ] swallowing issues  [X ] other: FTT, Dementia     Diet Presciption: Diet, Dysphagia 1 Pureed-Honey Consistency Fluid:   Supplement Feeding Modality:  Oral  Ensure Enlive Servings Per Day:  1       Frequency:  Three Times a day (18 @ 10:26)    Intake: Visited pt. alert but nonverbal, [place] Lankan speaking HHA at bedside, spoke with daughter-in-law Carine via phone, stated pt. with poor appetite at home & ongoing also fed by syringe thickened liquids with Ensure Plus mixed in, requesting Ensure Enlive with thickened powder with pt. meal while in hospital, declined feeding tube, encourage safe feeding practices with HHA/daughter, had BM yesterday noted, pt. on home hospice PTA. Discussed with MD/RN, add 1 can Ensure Enlive TID with 2 pkts of "Thick Up" powder @TID to Dysphagia Pureed 1 Honey consistency fluids, as medically feasible, RD available     Daily Weight in k.4 (2018 16:37)  Weight in k (2018 01:40)    % Weight Change: 4% since admission    Pertinent Medications: MEDICATIONS  (STANDING):  cefTRIAXone   IVPB 1 Gram(s) IV Intermittent every 24 hours  docusate sodium 100 milliGRAM(s) Oral three times a day  enoxaparin Injectable 40 milliGRAM(s) SubCutaneous daily  lactulose Syrup 10 Gram(s) Oral three times a day  metroNIDAZOLE  IVPB 500 milliGRAM(s) IV Intermittent every 8 hours  nystatin Ointment 1 Application(s) Topical two times a day  polyethylene glycol 3350 17 Gram(s) Oral daily  senna 2 Tablet(s) Oral at bedtime  sodium chloride 0.9%. 1000 milliLiter(s) (50 mL/Hr) IV Continuous <Continuous>    MEDICATIONS  (PRN):  acetaminophen   Tablet 650 milliGRAM(s) Oral every 6 hours PRN For Temp greater than 38 C (100.4 F)  acetaminophen   Tablet. 650 milliGRAM(s) Oral every 6 hours PRN Mild Pain (1 - 3)  LORazepam     Tablet 1 milliGRAM(s) Oral every 12 hours PRN Agitation  temazepam 30 milliGRAM(s) Oral at bedtime PRN Insomnia  traMADol 25 milliGRAM(s) Oral two times a day PRN Moderate Pain (4 - 6)    Pertinent Labs:  Na139 mmol/L Glu 108 mg/dL<H> K+ 3.9 mmol/L Cr  0.52 mg/dL BUN 11 mg/dL  Phos 3.7 mg/dL  Alb 2.2 g/dL<L>  [id number] 5.9 %<H>     CAPILLARY BLOOD GLUCOSE      POCT Blood Glucose.: 108 mg/dL (2018 06:06)  POCT Blood Glucose.: 126 mg/dL (2018 18:39)  POCT Blood Glucose.: 126 mg/dL (2018 17:25)    Skin: Intact    Estimated Needs:   [ X] no change since previous assessment  [ ] recalculated:     Previous Nutrition Diagnosis:   [ ] Inadequate Energy Intake [X ]Inadequate Oral Intake [ ] Excessive Energy Intake   [ ] Underweight [ ] Increased Nutrient Needs [ ] Overweight/Obesity   [ ] Altered GI Function [ ] Unintended Weight Loss [ ] Food & Nutrition Related Knowledge Deficit [ ] Malnutrition     Nutrition Diagnosis is [ X] ongoing  [ ] resolved [ ] not applicable     New Nutrition Diagnosis: [ ] not applicable       Interventions: To meet nutrition needs   Recommend  [ ] Change Diet To:  [ ] Nutrition Supplement  [ X] Nutrition Support  [ ] Other:     Monitoring and Evaluation:   [X ] PO intake [ x ] Tolerance to diet prescription [ x ] weights [ x ] labs[ x ] follow up per protocol  [X ] other: Reassessment: Nutrition consult requested for low Galdino score on 18. Pt. seen by RD & initial assessment done 6/3/18 noted.  82yFemalePatient is a 82y old  Female who presents with a chief complaint of dark vaginal discharge (2018 01:11)      Factors impacting intake: [ ] none [ ] nausea  [ ] vomiting [ ] diarrhea [ ] constipation  [ x]chewing problems [X ] swallowing issues  [X ] other: FTT, Dementia     Diet Presciption: Diet, Dysphagia 1 Pureed-Honey Consistency Fluid:   Supplement Feeding Modality:  Oral  Ensure Enlive Servings Per Day:  1       Frequency:  Three Times a day (18 @ 10:26)    Intake: Visited pt. alert but nonverbal, Gabonese speaking HHA at bedside, spoke with daughter-in-law Carine via phone, stated pt. with poor appetite at home & ongoing also fed by syringe thickened liquids with Ensure Plus mixed in, requesting Ensure Enlive with thickened powder with pt. meal while in hospital, declined feeding tube, encourage safe feeding practices with HHA/daughter, had BM yesterday noted, pt. on home hospice PTA. Discussed with MD/RN, add 1 can Ensure Enlive TID with 2 pkts of "Thick Up" powder @TID to Dysphagia Pureed 1 Honey consistency fluids, as medically feasible, RD available.    Daily Weight in k.4 (2018 16:37)  Weight in k (2018 01:40)    % Weight Change: 4% since admission    Pertinent Medications: MEDICATIONS  (STANDING):  cefTRIAXone   IVPB 1 Gram(s) IV Intermittent every 24 hours  docusate sodium 100 milliGRAM(s) Oral three times a day  enoxaparin Injectable 40 milliGRAM(s) SubCutaneous daily  lactulose Syrup 10 Gram(s) Oral three times a day  metroNIDAZOLE  IVPB 500 milliGRAM(s) IV Intermittent every 8 hours  nystatin Ointment 1 Application(s) Topical two times a day  polyethylene glycol 3350 17 Gram(s) Oral daily  senna 2 Tablet(s) Oral at bedtime  sodium chloride 0.9%. 1000 milliLiter(s) (50 mL/Hr) IV Continuous <Continuous>    MEDICATIONS  (PRN):  acetaminophen   Tablet 650 milliGRAM(s) Oral every 6 hours PRN For Temp greater than 38 C (100.4 F)  acetaminophen   Tablet. 650 milliGRAM(s) Oral every 6 hours PRN Mild Pain (1 - 3)  LORazepam     Tablet 1 milliGRAM(s) Oral every 12 hours PRN Agitation  temazepam 30 milliGRAM(s) Oral at bedtime PRN Insomnia  traMADol 25 milliGRAM(s) Oral two times a day PRN Moderate Pain (4 - 6)    Pertinent Labs:  Na139 mmol/L Glu 108 mg/dL<H> K+ 3.9 mmol/L Cr  0.52 mg/dL BUN 11 mg/dL  Phos 3.7 mg/dL  Alb 2.2 g/dL<L>  [id number] 5.9 %<H>     CAPILLARY BLOOD GLUCOSE      POCT Blood Glucose.: 108 mg/dL (2018 06:06)  POCT Blood Glucose.: 126 mg/dL (2018 18:39)  POCT Blood Glucose.: 126 mg/dL (2018 17:25)    Skin: Intact    Estimated Needs:   [ X] no change since previous assessment  [ ] recalculated:     Previous Nutrition Diagnosis:   [ ] Inadequate Energy Intake [X ]Inadequate Oral Intake [ ] Excessive Energy Intake   [ ] Underweight [ ] Increased Nutrient Needs [ ] Overweight/Obesity   [ ] Altered GI Function [ ] Unintended Weight Loss [ ] Food & Nutrition Related Knowledge Deficit [ ] Malnutrition     Nutrition Diagnosis is [ X] ongoing  [ ] resolved [ ] not applicable     New Nutrition Diagnosis: [ ] not applicable       Interventions: To meet nutrition needs   Recommend  [ ] Change Diet To:  [ ] Nutrition Supplement  [ X] Nutrition Support  [ ] Other:     Monitoring and Evaluation:   [X ] PO intake [ x ] Tolerance to diet prescription [ x ] weights [ x ] labs[ x ] follow up per protocol  [X ] other: Reassessment: Nutrition consult requested for low Galdino score on 18. Pt. seen by RD & initial assessment done 6/3/18 noted.  82yFemalePatient is a 82y old  Female who presents with a chief complaint of dark vaginal discharge (2018 01:11)      Factors impacting intake: [ ] none [ ] nausea  [ ] vomiting [ ] diarrhea [ ] constipation  [ x]chewing problems [X ] swallowing issues  [X ] other: FTT, Dementia     Diet Presciption: Diet, Dysphagia 1 Pureed-Honey Consistency Fluid:   Supplement Feeding Modality:  Oral  Ensure Enlive Servings Per Day:  1       Frequency:  Three Times a day (18 @ 10:26)    Intake: Visited pt. alert but nonverbal, Bangladeshi speaking HHA at bedside, spoke with daughter-in-law Carine via phone, stated pt. with poor appetite at home & ongoing also fed by syringe thickened liquids with Ensure Plus mixed in, requesting Ensure Enlive with thickened powder with pt. meal while in hospital, declined feeding tube, encourage safe feeding practices with HHA/daughter, rec. SLP evaluation as medically feasible, had BM yesterday noted, pt. on home hospice PTA. Discussed with MD/RN, add 1 can Ensure Enlive TID with 2 pkts of "Thick Up" powder @TID to Dysphagia Pureed 1 Honey consistency fluids, as medically feasible, RD available.    Daily Weight in k.4 (2018 16:37)  Weight in k (2018 01:40)    % Weight Change: 4% since admission    Pertinent Medications: MEDICATIONS  (STANDING):  cefTRIAXone   IVPB 1 Gram(s) IV Intermittent every 24 hours  docusate sodium 100 milliGRAM(s) Oral three times a day  enoxaparin Injectable 40 milliGRAM(s) SubCutaneous daily  lactulose Syrup 10 Gram(s) Oral three times a day  metroNIDAZOLE  IVPB 500 milliGRAM(s) IV Intermittent every 8 hours  nystatin Ointment 1 Application(s) Topical two times a day  polyethylene glycol 3350 17 Gram(s) Oral daily  senna 2 Tablet(s) Oral at bedtime  sodium chloride 0.9%. 1000 milliLiter(s) (50 mL/Hr) IV Continuous <Continuous>    MEDICATIONS  (PRN):  acetaminophen   Tablet 650 milliGRAM(s) Oral every 6 hours PRN For Temp greater than 38 C (100.4 F)  acetaminophen   Tablet. 650 milliGRAM(s) Oral every 6 hours PRN Mild Pain (1 - 3)  LORazepam     Tablet 1 milliGRAM(s) Oral every 12 hours PRN Agitation  temazepam 30 milliGRAM(s) Oral at bedtime PRN Insomnia  traMADol 25 milliGRAM(s) Oral two times a day PRN Moderate Pain (4 - 6)    Pertinent Labs:  Na139 mmol/L Glu 108 mg/dL<H> K+ 3.9 mmol/L Cr  0.52 mg/dL BUN 11 mg/dL  Phos 3.7 mg/dL  Alb 2.2 g/dL<L>  [id number] 5.9 %<H>     CAPILLARY BLOOD GLUCOSE      POCT Blood Glucose.: 108 mg/dL (2018 06:06)  POCT Blood Glucose.: 126 mg/dL (2018 18:39)  POCT Blood Glucose.: 126 mg/dL (2018 17:25)    Skin: Intact    Estimated Needs:   [ X] no change since previous assessment  [ ] recalculated:     Previous Nutrition Diagnosis:   [ ] Inadequate Energy Intake [X ]Inadequate Oral Intake [ ] Excessive Energy Intake   [ ] Underweight [ ] Increased Nutrient Needs [ ] Overweight/Obesity   [ ] Altered GI Function [ ] Unintended Weight Loss [ ] Food & Nutrition Related Knowledge Deficit [ ] Malnutrition     Nutrition Diagnosis is [ X] ongoing  [ ] resolved [ ] not applicable     New Nutrition Diagnosis: [ ] not applicable       Interventions: To meet nutrition needs   Recommend  [ ] Change Diet To:  [ ] Nutrition Supplement  [ X] Nutrition Support  [ ] Other:     Monitoring and Evaluation:   [X ] PO intake [ x ] Tolerance to diet prescription [ x ] weights [ x ] labs[ x ] follow up per protocol  [X ] other:

## 2018-06-05 NOTE — PROGRESS NOTE ADULT - ATTENDING COMMENTS
Patient was seen and examined by myself. Case was discussed with house staff in details. I have reviewed and agree with the plan as outlined above with edits where appropriate. Patient was seen and examined by myself. Case was discussed with house staff in details. I have reviewed and agree with the plan as outlined above with edits where appropriate.  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  Doubt she will be a candidate for surgical intervention   continue to monitor.  Continue bowel regimen for severe cn=constipation.  Obtain abdominal XRAY to follow up   Discussed with family

## 2018-06-05 NOTE — CONSULT NOTE ADULT - SUBJECTIVE AND OBJECTIVE BOX
82 year old female admitted to medicine.   History obtained per chart as pt is nonverbal, on home hospice, DNR/DNI  GYN consulted for evaluation of possible rectovaginal fistula as pt reported to have brownish vaginal discharge.    Case discussed with healthcare proxy Louann Turcios [phone], verbal consent taken for vaginal examination to evaluate for rectovaginal fistula. Family requesting vaginal exam despite being DNR/DNI.      PAST MEDICAL & SURGICAL HISTORY:  Urticaria  Dementia with behavioral disturbance, unspecified dementia type  Essential hypertension  No significant past surgical history    MEDICATIONS  (STANDING):  acetaminophen  IVPB. 1000 milliGRAM(s) IV Intermittent once  cefTRIAXone   IVPB 1 Gram(s) IV Intermittent every 24 hours  docusate sodium 100 milliGRAM(s) Oral three times a day  enoxaparin Injectable 40 milliGRAM(s) SubCutaneous daily  lactulose Syrup 10 Gram(s) Oral three times a day  metroNIDAZOLE  IVPB 500 milliGRAM(s) IV Intermittent every 8 hours  nystatin Ointment 1 Application(s) Topical two times a day  polyethylene glycol 3350 17 Gram(s) Oral daily  risperiDONE   Tablet 1 milliGRAM(s) Oral once  senna 2 Tablet(s) Oral at bedtime  sodium chloride 0.9%. 1000 milliLiter(s) (50 mL/Hr) IV Continuous <Continuous>    MEDICATIONS  (PRN):  acetaminophen   Tablet 650 milliGRAM(s) Oral every 6 hours PRN For Temp greater than 38 C (100.4 F)  acetaminophen   Tablet. 650 milliGRAM(s) Oral every 6 hours PRN Mild Pain (1 - 3)  LORazepam     Tablet 1 milliGRAM(s) Oral every 12 hours PRN Agitation  temazepam 30 milliGRAM(s) Oral at bedtime PRN Insomnia  traMADol 25 milliGRAM(s) Oral two times a day PRN Moderate Pain (4 - 6)      Allergies  No Known Allergies    FAMILY HISTORY:  No pertinent family history in first degree relatives      Vital Signs Last 24 Hrs  T(C): 37 (05 Jun 2018 14:13), Max: 37 (05 Jun 2018 14:13)  T(F): 98.6 (05 Jun 2018 14:13), Max: 98.6 (05 Jun 2018 14:13)  HR: 93 (05 Jun 2018 14:13) (87 - 96)  BP: 107/59 (05 Jun 2018 14:13) (94/64 - 107/59)  BP(mean): --  RR: 18 (05 Jun 2018 14:13) (18 - 18)  SpO2: 95% (05 Jun 2018 14:13) (95% - 97%)    PHYSICAL EXAM:    Gen: non verbal, NAD  Chest: CTA B/L  Cardiac: S1,S2  RRR  Abdomen: +BS; soft; Nontender, nondistended, no rebound or guarding   Gyn: no bleeding, no pooling, mild yellow discharge, no CMT, no AT, no abnormal masses   Extremities: Nontender, no edema    LABS:                        11.0   7.8   )-----------( 391      ( 05 Jun 2018 08:26 )             34.5     06-05    139  |  105  |  11  ----------------------------<  108<H>  3.9   |  29  |  0.52    Ca    8.4      05 Jun 2018 08:26  Phos  3.7     06-04  Mg     2.5     06-05      PT/INR - ( 05 Jun 2018 08:26 )   PT: 12.9 sec;   INR: 1.18 ratio      RADIOLOGY & ADDITIONAL STUDIES:    < from: CT Abdomen and Pelvis w/ IV Cont (05.31.18 @ 22:38) >  IMPRESSION:    Markedly stool distended rectum with rectal wall thickening and   perirectal/presacral edema. Findings suggest stercoral colitis.   Evaluation for rectovaginal fistula is nondiagnostic without   administration of rectal contrast.    < end of copied text >    < from: CT Abdomen and Pelvis w/ Oral Cont and w/ IV Cont (06.03.18 @ 16:57) >  IMPRESSION:    Oral contrast has only reached the right colon. The colon is filled with   a large amount of stool. The rectum is markedly distended with a   thickened wall and surrounding inflammatory changes and lymph nodes   suggesting stercoral colitis. The uterus demonstrates air within the   endometrium which is concerning for fistula, however this cannot be   evaluated at this time in that oral contrast has not reached this area.   This would be better evaluated following evacuation of stool and with   rectal administration of contrast or vaginogram.     < end of copied text > 82 year old female admitted to medicine.   History obtained per chart as pt is nonverbal, on home hospice, DNR/DNI  GYN consulted for evaluation of possible rectovaginal fistula as family reported to have brownish vaginal discharge.    Case discussed with healthcare proxy Louann Turcios [phone], verbal consent taken for vaginal examination to evaluate for rectovaginal fistula. Family requesting vaginal exam despite being DNR/DNI.      PAST MEDICAL & SURGICAL HISTORY:  Urticaria  Dementia with behavioral disturbance, unspecified dementia type  Essential hypertension  No significant past surgical history    MEDICATIONS  (STANDING):  acetaminophen  IVPB. 1000 milliGRAM(s) IV Intermittent once  cefTRIAXone   IVPB 1 Gram(s) IV Intermittent every 24 hours  docusate sodium 100 milliGRAM(s) Oral three times a day  enoxaparin Injectable 40 milliGRAM(s) SubCutaneous daily  lactulose Syrup 10 Gram(s) Oral three times a day  metroNIDAZOLE  IVPB 500 milliGRAM(s) IV Intermittent every 8 hours  nystatin Ointment 1 Application(s) Topical two times a day  polyethylene glycol 3350 17 Gram(s) Oral daily  risperiDONE   Tablet 1 milliGRAM(s) Oral once  senna 2 Tablet(s) Oral at bedtime  sodium chloride 0.9%. 1000 milliLiter(s) (50 mL/Hr) IV Continuous <Continuous>    MEDICATIONS  (PRN):  acetaminophen   Tablet 650 milliGRAM(s) Oral every 6 hours PRN For Temp greater than 38 C (100.4 F)  acetaminophen   Tablet. 650 milliGRAM(s) Oral every 6 hours PRN Mild Pain (1 - 3)  LORazepam     Tablet 1 milliGRAM(s) Oral every 12 hours PRN Agitation  temazepam 30 milliGRAM(s) Oral at bedtime PRN Insomnia  traMADol 25 milliGRAM(s) Oral two times a day PRN Moderate Pain (4 - 6)      Allergies  No Known Allergies    FAMILY HISTORY:  No pertinent family history in first degree relatives      Vital Signs Last 24 Hrs  T(C): 37 (05 Jun 2018 14:13), Max: 37 (05 Jun 2018 14:13)  T(F): 98.6 (05 Jun 2018 14:13), Max: 98.6 (05 Jun 2018 14:13)  HR: 93 (05 Jun 2018 14:13) (87 - 96)  BP: 107/59 (05 Jun 2018 14:13) (94/64 - 107/59)  BP(mean): --  RR: 18 (05 Jun 2018 14:13) (18 - 18)  SpO2: 95% (05 Jun 2018 14:13) (95% - 97%)    PHYSICAL EXAM:    Gen: slim, non verbal, NAD, pt not very cooperative to exam   Abdomen: +BS; soft; Nontender, nondistended, no rebound or guarding   Gyn: external genitalia appears irritated with some vulvitis, digital exam is limited but no abnormal masses are palpable, no fistulas are palpable. there is a brownish discharge suggestive of feces.     LABS:                        11.0   7.8   )-----------( 391      ( 05 Jun 2018 08:26 )             34.5     06-05    139  |  105  |  11  ----------------------------<  108<H>  3.9   |  29  |  0.52    Ca    8.4      05 Jun 2018 08:26  Phos  3.7     06-04  Mg     2.5     06-05      PT/INR - ( 05 Jun 2018 08:26 )   PT: 12.9 sec;   INR: 1.18 ratio      RADIOLOGY & ADDITIONAL STUDIES:    < from: CT Abdomen and Pelvis w/ IV Cont (05.31.18 @ 22:38) >  IMPRESSION:    Markedly stool distended rectum with rectal wall thickening and   perirectal/presacral edema. Findings suggest stercoral colitis.   Evaluation for rectovaginal fistula is nondiagnostic without   administration of rectal contrast.    < end of copied text >    < from: CT Abdomen and Pelvis w/ Oral Cont and w/ IV Cont (06.03.18 @ 16:57) >  IMPRESSION:    Oral contrast has only reached the right colon. The colon is filled with   a large amount of stool. The rectum is markedly distended with a   thickened wall and surrounding inflammatory changes and lymph nodes   suggesting stercoral colitis. The uterus demonstrates air within the   endometrium which is concerning for fistula, however this cannot be   evaluated at this time in that oral contrast has not reached this area.   This would be better evaluated following evacuation of stool and with   rectal administration of contrast or vaginogram.     < end of copied text >

## 2018-06-05 NOTE — PROGRESS NOTE ADULT - ASSESSMENT
Patient is a 82F from home, lives with son, has HHA, nonverbal, bedbound x 3 weeks, on home hospice, DNR/DNI, with PMH dementia (worsening over past 6 mo) brought in by daughter in law Carine Younger [phone] as recommended by hospice MD due to leukocytosis, unknown number. Admitted for sepsis 2/2 stercoral colitis, r/o rectovaginal fistula.     Problem/Plan - 1:  ·  Problem: Colitis.  Plan: Stercoral colitis - c/w rocephin flagyl  c/w iv fluids as pt has vaso vagal near syncope last evening  blood and urine cultures negative.   pt having bowel movements with multiple laxatives, will continue to clear remaining stool from colon  Repeat CT Abd/pelvis with oral and iv contrast to r/o rectovaginal fistula done on sunday but inconclusive as contrast reached only upto colona nd colon filled with stool and rectum edema and inflammation as above, need more stool evacuation.  will repeat ct abd with oral contrast in a day or two after more bowel movements  Family not sure if want to pursue surgery if fistual is found  Discussed plan with Attedning and family     Problem/Plan - 2:  ·  Problem: Sepsis.  Plan:   will treat for stercocolitis  WBC improving 10 today  3 weeks dark vaginal discharge mixed with pus and feces  failed cipro as outpatient   CT abd/pelvis: Markedly stool distended rectum with rectal wall thickening and perirectal/presacral edema. Findings suggest stercoral colitis. Evaluation for rectovaginal fistula is nondiagnostic without administration of rectal contrast  c/w rocephin, flagyl to cover for / GI pathology  blood culture and urine culture neg  BP improved       Problem/Plan - 3:  ·  Problem: Vaginal discharge.  Plan: plan as above       Problem/Plan - 4:  ·  Problem: Constipation.  Plan: c/w lactulose, colace, senna, miralax. 1 dose of mg citrate given     Problem/Plan - 5:  ·  Problem: Dementia with behavioral disturbance, unspecified dementia type.  Plan: dysphagia diet  nonverbal , bedbound  c/w ativan PRN, Risperdal, temazepam hs PRN for insomnia  patient is on home hospice  aspiration and fall risk  DNR/DNI.      Problem/Plan - 6:  Problem: Need for prophylactic measure. Plan: IMPROVE 2, c/w lovenox for dvt ppx Patient is a 82F from home, lives with son, has HHA, nonverbal, bedbound x 3 weeks, on home hospice, DNR/DNI, with PMH dementia (worsening over past 6 mo) brought in by daughter in law Carine Younger [phone] as recommended by hospice MD due to leukocytosis, unknown number. Admitted for sepsis 2/2 stercoral colitis, r/o rectovaginal fistula.     Problem/Plan - 1:  ·  Problem: Colitis.  Plan: Stercoral colitis - c/w rocephin flagyl  c/w iv fluids as pt has vaso vagal near syncope last evening  blood and urine cultures negative.   pt having bowel movements with multiple laxatives, will continue to clear remaining stool from colon  Repeat CT Abd/pelvis with oral and iv contrast to r/o rectovaginal fistula done on sunday but inconclusive as contrast reached only upto colona nd colon filled with stool and rectum edema and inflammation as above, need more stool evacuation.  will repeat ct abd with oral contrast in a day or two after more bowel movements  Family not sure if want to pursue surgery if fistual is found  Discussed plan with Attedning and family     Problem/Plan - 2:  ·  Problem: Sepsis.  Plan:   will treat for stercocolitis  WBC improving 10 today  3 weeks dark vaginal discharge mixed with pus and feces  failed cipro as outpatient   CT abd/pelvis: Markedly stool distended rectum with rectal wall thickening and perirectal/presacral edema. Findings suggest stercoral colitis. Evaluation for rectovaginal fistula is nondiagnostic without administration of rectal contrast  c/w rocephin, flagyl to cover for / GI pathology  blood culture and urine culture neg  IVF        Problem/Plan - 3:  ·  Problem: Vaginal discharge.  Plan: plan as above       Problem/Plan - 4:  ·  Problem: Constipation.  Plan: c/w lactulose, colace, senna, miralax. 1 dose of mg citrate given     Problem/Plan - 5:  ·  Problem: Dementia with behavioral disturbance, unspecified dementia type.  Plan: dysphagia diet  nonverbal , bedbound  c/w ativan PRN, Risperdal, temazepam hs PRN for insomnia  patient is on home hospice  aspiration and fall risk  DNR/DNI.      Problem/Plan - 6:  Problem: Need for prophylactic measure. Plan: IMPROVE 2, c/w lovenox for dvt ppx Patient is a 82F from home, lives with son, has HHA, nonverbal, bedbound x 3 weeks, on home hospice, DNR/DNI, with PMH dementia (worsening over past 6 mo) brought in by daughter in law Carine Younger [phone] as recommended by hospice MD due to leukocytosis, unknown number. Admitted for sepsis 2/2 stercoral colitis, r/o rectovaginal fistula.     Problem/Plan - 1:  ·  Problem: Colitis.  Plan: Stercoral colitis - c/w rocephin flagyl  c/w iv fluids as pt has vaso vagal near syncope last evening  blood and urine cultures negative.   pt having bowel movements with multiple laxatives, will continue to clear remaining stool from colon  Repeat CT Abd/pelvis with oral and iv contrast to r/o rectovaginal fistula done on sunday but inconclusive as contrast reached only upto colona nd colon filled with stool and rectum edema and inflammation as above, need more stool evacuation.  will repeat ct abd with oral contrast in a day or two after more bowel movements  Family not sure if want to pursue surgery if fistual is found  Discussed plan with Attedning and family     Problem/Plan - 2:  ·  Problem: Sepsis.  Plan:   will treat for stercocolitis  WBC improving 10 today  3 weeks dark vaginal discharge mixed with pus and feces  failed cipro as outpatient   CT abd/pelvis: Markedly stool distended rectum with rectal wall thickening and perirectal/presacral edema. Findings suggest stercoral colitis. Evaluation for rectovaginal fistula is nondiagnostic without administration of rectal contrast  c/w rocephin, flagyl to cover for / GI pathology  blood culture and urine culture neg  IVF   PT IS AGITATED AND WALKING AROUND, SPITTING OUT PILLS, WILL GIVE ATIVAN 1 MG IV X1       Problem/Plan - 3:  ·  Problem: Vaginal discharge.  Plan: plan as above       Problem/Plan - 4:  ·  Problem: Constipation.  Plan: c/w lactulose, colace, senna, miralax. 1 dose of mg citrate given     Problem/Plan - 5:  ·  Problem: Dementia with behavioral disturbance, unspecified dementia type.  Plan: dysphagia diet  nonverbal , bedbound  c/w ativan PRN, Risperdal, temazepam hs PRN for insomnia  patient is on home hospice  aspiration and fall risk  DNR/DNI.      Problem/Plan - 6:  Problem: Need for prophylactic measure. Plan: IMPROVE 2, c/w lovenox for dvt ppx Patient is a 82F from home, lives with son, has HHA, nonverbal, bedbound x 3 weeks, on home hospice, DNR/DNI, with PMH dementia (worsening over past 6 mo) brought in by daughter in law Carine Younger [phone] as recommended by hospice MD due to leukocytosis, unknown number. Admitted for sepsis 2/2 stercoral colitis, r/o rectovaginal fistula.     Problem/Plan - 1:  ·  Problem: Colitis.  Plan: Stercoral colitis - c/w rocephin flagyl  c/w iv fluids as pt has vaso vagal near syncope last evening  blood and urine cultures negative.   pt having bowel movements with multiple laxatives, will slow down on laxatives as pt had vaso vagal near syncopal episode last evening. Also, pt is agitated today.  Repeat CT Abd/pelvis with oral and iv contrast to r/o rectovaginal fistula done on sunday but inconclusive as contrast reached only upto colona nd colon filled with stool and rectum edema and inflammation as above, need more stool evacuation.  will repeat ct abd with oral contrast in a day or two after more bowel movements  Family not sure if want to pursue surgery if fistual is found  Discussed plan with Attending and family- Son and daughter in law and connected with surgery PA as well for details of fistula repair surgery, they will let us know their decision regarding surgery after discussing with other family members.     Problem/Plan - 2:  ·  Problem: Sepsis.  Plan:   will treat for stercocolitis  WBC improving 10 today  3 weeks dark vaginal discharge mixed with pus and feces  failed cipro as outpatient   CT abd/pelvis: Markedly stool distended rectum with rectal wall thickening and perirectal/presacral edema. Findings suggest stercoral colitis. Evaluation for rectovaginal fistula is nondiagnostic without administration of rectal contrast  c/w rocephin, flagyl to cover for / GI pathology  blood culture and urine culture neg  IVF   PT IS AGITATED AND WALKING AROUND, SPITTING OUT PILLS, WILL GIVE ATIVAN 1 MG IV X1       Problem/Plan - 3:  ·  Problem: Vaginal discharge.  Plan: plan as above       Problem/Plan - 4:  ·  Problem: Constipation.  Plan: c/w lactulose, colace, senna, miralax. 1 dose of mg citrate given     Problem/Plan - 5:  ·  Problem: Dementia with behavioral disturbance, unspecified dementia type.  Plan: dysphagia diet  nonverbal , bedbound  c/w ativan PRN, Risperdal, temazepam hs PRN for insomnia  patient is on home hospice  aspiration and fall risk  DNR/DNI.      Problem/Plan - 6:  Problem: Need for prophylactic measure. Plan: IMPROVE 2, c/w lovenox for dvt ppx Patient is a 82F from home, lives with son, has HHA, nonverbal, bedbound x 3 weeks, on home hospice, DNR/DNI, with PMH dementia (worsening over past 6 mo) brought in by daughter in law Carine Younger [phone] as recommended by hospice MD due to leukocytosis, unknown number. Admitted for sepsis 2/2 stercoral colitis, r/o rectovaginal fistula.     Problem/Plan - 1:  ·  Problem: Colitis.  Plan: Stercoral colitis - c/w rocephin flagyl  c/w iv fluids as pt has vaso vagal near syncope last evening  blood and urine cultures negative.   pt having bowel movements with multiple laxatives, will slow down on laxatives as pt had vaso vagal near syncopal episode last evening. Also, pt is agitated today.  Repeat CT Abd/pelvis with oral and iv contrast to r/o rectovaginal fistula done on sunday but inconclusive as contrast reached only upto colona nd colon filled with stool and rectum edema and inflammation as above, need more stool evacuation.  will repeat ct abd with oral contrast in a day or two after more bowel movements  Family not sure if want to pursue surgery if fistual is found  Discussed plan with Attending and family- Son and daughter in law and connected with surgery PA as well for details of fistula repair surgery, they will let us know their decision regarding surgery after discussing with other family members.     Problem/Plan - 2:  ·  Problem: Sepsis.  Plan:   will treat for stercocolitis  WBC improving 10 today  3 weeks dark vaginal discharge mixed with pus and feces  failed cipro as outpatient   CT abd/pelvis: Markedly stool distended rectum with rectal wall thickening and perirectal/presacral edema. Findings suggest stercoral colitis. Evaluation for rectovaginal fistula is nondiagnostic without administration of rectal contrast  c/w rocephin, flagyl to cover for / GI pathology  blood culture and urine culture neg  IVF   PT IS AGITATED, SPITTING OUT PILLS, WILL GIVE ATIVAN 1 MG IV X1       Problem/Plan - 3:  ·  Problem: Vaginal discharge.  Plan: plan as above       Problem/Plan - 4:  ·  Problem: Constipation.  Plan: c/w lactulose, colace, senna, miralax. 1 dose of mg citrate given     Problem/Plan - 5:  ·  Problem: Dementia with behavioral disturbance, unspecified dementia type.  Plan: dysphagia diet  nonverbal , bedbound  c/w ativan PRN, Risperdal, temazepam hs PRN for insomnia  patient is on home hospice  aspiration and fall risk  DNR/DNI.      Problem/Plan - 6:  Problem: Need for prophylactic measure. Plan: IMPROVE 2, c/w lovenox for dvt ppx

## 2018-06-05 NOTE — CONSULT NOTE ADULT - ASSESSMENT
82 year old female, symptoms suggestive of GI/vaginal fistula, CT meneses is inconclusive, if you feel a rectovaginal fistula needs to be ruled in or out, we recommend the following: place a tampon in the vagina, instill 200ccs of water with 3ccs of methylene blue as an enema. once the liquid is evacuated, remove the tampon, a blue soaked enema would confirm the presence of a fistula.   The most likely causes of fistula in these cases are colon cancer or diverticulitis.   it would be useful to have surgery evaluate for this condition   plan per Dr. Velez

## 2018-06-06 DIAGNOSIS — R69 ILLNESS, UNSPECIFIED: ICD-10-CM

## 2018-06-06 DIAGNOSIS — N82.3 FISTULA OF VAGINA TO LARGE INTESTINE: ICD-10-CM

## 2018-06-06 DIAGNOSIS — R53.2 FUNCTIONAL QUADRIPLEGIA: ICD-10-CM

## 2018-06-06 LAB
CULTURE RESULTS: SIGNIFICANT CHANGE UP
CULTURE RESULTS: SIGNIFICANT CHANGE UP
SPECIMEN SOURCE: SIGNIFICANT CHANGE UP
SPECIMEN SOURCE: SIGNIFICANT CHANGE UP

## 2018-06-06 PROCEDURE — 99233 SBSQ HOSP IP/OBS HIGH 50: CPT | Mod: GC

## 2018-06-06 PROCEDURE — 74018 RADEX ABDOMEN 1 VIEW: CPT | Mod: 26

## 2018-06-06 RX ORDER — MORPHINE SULFATE 50 MG/1
1 CAPSULE, EXTENDED RELEASE ORAL EVERY 6 HOURS
Qty: 0 | Refills: 0 | Status: DISCONTINUED | OUTPATIENT
Start: 2018-06-06 | End: 2018-06-07

## 2018-06-06 RX ORDER — DOCUSATE SODIUM 100 MG
100 CAPSULE ORAL
Qty: 0 | Refills: 0 | Status: DISCONTINUED | OUTPATIENT
Start: 2018-06-06 | End: 2018-06-08

## 2018-06-06 RX ORDER — RISPERIDONE 4 MG/1
1 TABLET ORAL AT BEDTIME
Qty: 0 | Refills: 0 | Status: DISCONTINUED | OUTPATIENT
Start: 2018-06-06 | End: 2018-06-07

## 2018-06-06 RX ORDER — MULTIVIT WITH MIN/MFOLATE/K2 340-15/3 G
300 POWDER (GRAM) ORAL ONCE
Qty: 0 | Refills: 0 | Status: COMPLETED | OUTPATIENT
Start: 2018-06-06 | End: 2018-06-06

## 2018-06-06 RX ORDER — MORPHINE SULFATE 50 MG/1
1 CAPSULE, EXTENDED RELEASE ORAL EVERY 8 HOURS
Qty: 0 | Refills: 0 | Status: DISCONTINUED | OUTPATIENT
Start: 2018-06-06 | End: 2018-06-06

## 2018-06-06 RX ORDER — MORPHINE SULFATE 50 MG/1
2 CAPSULE, EXTENDED RELEASE ORAL EVERY 6 HOURS
Qty: 0 | Refills: 0 | Status: DISCONTINUED | OUTPATIENT
Start: 2018-06-06 | End: 2018-06-06

## 2018-06-06 RX ORDER — ACETAMINOPHEN 500 MG
650 TABLET ORAL EVERY 6 HOURS
Qty: 0 | Refills: 0 | Status: DISCONTINUED | OUTPATIENT
Start: 2018-06-06 | End: 2018-06-08

## 2018-06-06 RX ADMIN — RISPERIDONE 1 MILLIGRAM(S): 4 TABLET ORAL at 12:09

## 2018-06-06 RX ADMIN — Medication 100 MILLIGRAM(S): at 21:27

## 2018-06-06 RX ADMIN — RISPERIDONE 1 MILLIGRAM(S): 4 TABLET ORAL at 21:28

## 2018-06-06 RX ADMIN — CEFTRIAXONE 100 GRAM(S): 500 INJECTION, POWDER, FOR SOLUTION INTRAMUSCULAR; INTRAVENOUS at 05:08

## 2018-06-06 RX ADMIN — ENOXAPARIN SODIUM 40 MILLIGRAM(S): 100 INJECTION SUBCUTANEOUS at 12:09

## 2018-06-06 RX ADMIN — Medication 1 MILLIGRAM(S): at 08:31

## 2018-06-06 RX ADMIN — Medication 100 MILLIGRAM(S): at 13:51

## 2018-06-06 RX ADMIN — NYSTATIN CREAM 1 APPLICATION(S): 100000 CREAM TOPICAL at 17:42

## 2018-06-06 RX ADMIN — LACTULOSE 10 GRAM(S): 10 SOLUTION ORAL at 13:44

## 2018-06-06 RX ADMIN — NYSTATIN CREAM 1 APPLICATION(S): 100000 CREAM TOPICAL at 06:27

## 2018-06-06 RX ADMIN — TRAMADOL HYDROCHLORIDE 25 MILLIGRAM(S): 50 TABLET ORAL at 07:56

## 2018-06-06 RX ADMIN — Medication 100 MILLIGRAM(S): at 06:27

## 2018-06-06 RX ADMIN — TEMAZEPAM 30 MILLIGRAM(S): 15 CAPSULE ORAL at 21:27

## 2018-06-06 RX ADMIN — Medication 300 MILLILITER(S): at 17:42

## 2018-06-06 RX ADMIN — Medication 650 MILLIGRAM(S): at 13:52

## 2018-06-06 RX ADMIN — POLYETHYLENE GLYCOL 3350 17 GRAM(S): 17 POWDER, FOR SOLUTION ORAL at 12:08

## 2018-06-06 RX ADMIN — SENNA PLUS 2 TABLET(S): 8.6 TABLET ORAL at 21:28

## 2018-06-06 RX ADMIN — MORPHINE SULFATE 1 MILLIGRAM(S): 50 CAPSULE, EXTENDED RELEASE ORAL at 19:01

## 2018-06-06 RX ADMIN — TRAMADOL HYDROCHLORIDE 25 MILLIGRAM(S): 50 TABLET ORAL at 08:45

## 2018-06-06 RX ADMIN — LACTULOSE 10 GRAM(S): 10 SOLUTION ORAL at 06:26

## 2018-06-06 NOTE — PROGRESS NOTE ADULT - SUBJECTIVE AND OBJECTIVE BOX
MEDICAL ATTENDING NOTE    Patient is a 82y old  Female who presents with a chief complaint of dark vaginal discharge (01 Jun 2018 01:11)      INTERVAL HPI/OVERNIGHT EVENTS: no new complaints    MEDICATIONS  (STANDING):  cefTRIAXone   IVPB 1 Gram(s) IV Intermittent every 24 hours  docusate sodium 100 milliGRAM(s) Oral three times a day  enoxaparin Injectable 40 milliGRAM(s) SubCutaneous daily  metroNIDAZOLE  IVPB 500 milliGRAM(s) IV Intermittent every 8 hours  morphine   Solution 1 milliGRAM(s) Oral every 8 hours  nystatin Ointment 1 Application(s) Topical two times a day  risperiDONE   Tablet 1 milliGRAM(s) Oral daily  senna 2 Tablet(s) Oral at bedtime    MEDICATIONS  (PRN):  acetaminophen    Suspension. 650 milliGRAM(s) Oral every 6 hours PRN Moderate Pain (4 - 6)  LORazepam     Tablet 1 milliGRAM(s) Oral every 12 hours PRN Agitation  temazepam 30 milliGRAM(s) Oral at bedtime PRN Insomnia      __________________________________________________  ----------------------------------------------------------------------------------  REVIEW OF SYSTEMS: groaning and crying presumed due to discomfort      Vital Signs Last 24 Hrs  T(C): 36.8 (06 Jun 2018 14:13), Max: 37.1 (05 Jun 2018 21:35)  T(F): 98.3 (06 Jun 2018 14:13), Max: 98.7 (05 Jun 2018 21:35)  HR: 111 (06 Jun 2018 14:13) (86 - 111)  BP: 123/73 (06 Jun 2018 14:13) (117/68 - 123/73)  BP(mean): --  RR: 18 (06 Jun 2018 14:13) (16 - 18)  SpO2: 94% (06 Jun 2018 14:13) (94% - 96%)    _________________  PHYSICAL EXAM:  ---------------------------   NAD; Normocephalic;   LUNGS - no wheezing; good air entry  HEART: S1 S2+   ABDOMEN: Soft, Nontender, non distended; BS+  EXTREMITIES: no cyanosis; no edema  NERVOUS SYSTEM:  Awake and alert but non communicative; no new deficits    _________________________________________________  LABS:                        11.0   7.8   )-----------( 391      ( 05 Jun 2018 08:26 )             34.5     06-05    139  |  105  |  11  ----------------------------<  108<H>  3.9   |  29  |  0.52    Ca    8.4      05 Jun 2018 08:26  Mg     2.5     06-05      PT/INR - ( 05 Jun 2018 08:26 )   PT: 12.9 sec;   INR: 1.18 ratio             CAPILLARY BLOOD GLUCOSE      POCT Blood Glucose.: 113 mg/dL (06 Jun 2018 18:27)  POCT Blood Glucose.: 105 mg/dL (06 Jun 2018 06:00)  POCT Blood Glucose.: 118 mg/dL (05 Jun 2018 23:33)            Care Discussed with Consultants :     Plan of care was discussed with patient's family ; all questions and concerns were addressed and care was aligned with patient's wishes.

## 2018-06-06 NOTE — PROGRESS NOTE ADULT - ATTENDING COMMENTS
Plan discussed with patient's family in details at bedside and all their questions / concerns were addressed

## 2018-06-06 NOTE — PROGRESS NOTE ADULT - ASSESSMENT
Patient is a 82F from home, lives with son, has HHA, nonverbal, bedbound x 3 weeks, on home hospice, DNR/DNI, with PMH dementia (worsening over past 6 mo) brought in by daughter in law Carine Younger [phone] as recommended by hospice MD due to leukocytosis, unknown number. Admitted for sepsis 2/2 stercoral colitis, r/o rectovaginal fistula.     Problem/Plan - 1:  ·  Problem: Colitis.  Plan: Stercoral colitis - c/w rocephin flagyl  blood and urine cultures negative.   pt having bowel movements with multiple laxatives, will give 1 dose of Mg citrate today as AXR showed mild to mod increase in fecal content in colon  OBGYN consulted rec methylene blue test to r/o recto vaginal fistula  Family not sure if want to pursue surgery if fistual is found  will repeat ct abd with oral contrast in a day or two after more bowel movements  Discussed plan with Attending and family-  they will let us know their decision regarding surgery after discussing with other family members.     Problem/Plan - 2:  ·  Problem: Sepsis.  Plan:   will treat for stercocolitis  WBC improving   3 weeks dark vaginal discharge mixed with pus and feces  failed cipro as outpatient   c/w rocephin, flagyl to cover for / GI pathology  blood culture and urine culture neg       Problem/Plan - 3:  ·  Problem: Vaginal discharge.  Plan: plan as above       Problem/Plan - 4:  ·  Problem: Constipation.  Plan: c/w lactulose, colace, senna, miralax. 1 dose of mg citrate given     Problem/Plan - 5:  ·  Problem: Dementia with behavioral disturbance, unspecified dementia type.  Plan: dysphagia diet  nonverbal , bedbound  c/w ativan PRN, Risperdal, temazepam hs PRN for insomnia  patient is on home hospice  aspiration and fall risk  DNR/DNI.      Problem/Plan - 6:  Problem: Need for prophylactic measure. Plan: IMPROVE 2, c/w lovenox for dvt ppx Patient is a 82F from home, lives with son, has HHA, nonverbal, bedbound x 3 weeks, on home hospice, DNR/DNI, with PMH dementia (worsening over past 6 mo) brought in by daughter in law Carine Younger [phone] as recommended by hospice MD due to leukocytosis, unknown number. Admitted for sepsis 2/2 stercoral colitis, r/o rectovaginal fistula.     Problem/Plan - 1:  ·  Problem: Colitis.  Plan: Stercoral colitis - c/w rocephin flagyl  blood and urine cultures negative.   pt having bowel movements with multiple laxatives, will give 1 dose of Mg citrate today as AXR showed mild to mod increase in fecal content in colon  OBGYN consulted rec methylene blue test to be given as enema to r/o recto vaginal fistula but pt has rectal edema and inflammation. will confirm if safe with OBGYN  Family not sure if want to pursue surgery if fistual is found  will repeat ct abd with oral contrast in a day or two after more bowel movements  Discussed plan with Attending and family-  they will let us know their decision regarding surgery after discussing with other family members.     Problem/Plan - 2:  ·  Problem: Sepsis.  Plan:   will treat for stercocolitis  WBC improving   3 weeks dark vaginal discharge mixed with pus and feces  failed cipro as outpatient   c/w rocephin, flagyl to cover for / GI pathology  blood culture and urine culture neg       Problem/Plan - 3:  ·  Problem: Vaginal discharge.  Plan: plan as above       Problem/Plan - 4:  ·  Problem: Constipation.  Plan: c/w lactulose, colace, senna, miralax. 1 dose of mg citrate given     Problem/Plan - 5:  ·  Problem: Dementia with behavioral disturbance, unspecified dementia type.  Plan: dysphagia diet  nonverbal , bedbound  c/w ativan PRN, Risperdal, temazepam hs PRN for insomnia  patient is on home hospice  aspiration and fall risk  DNR/DNI.      Problem/Plan - 6:  Problem: Need for prophylactic measure. Plan: IMPROVE 2, c/w lovenox for dvt ppx

## 2018-06-06 NOTE — PROGRESS NOTE ADULT - SUBJECTIVE AND OBJECTIVE BOX
INTERVAL HPI/OVERNIGHT EVENTS: pt seen and examined. pt having bowel movements. no new complaints    VITAL SIGNS:  Vital Signs Last 24 Hrs  T(C): 36.8 (06 Jun 2018 14:13), Max: 37.1 (05 Jun 2018 21:35)  T(F): 98.3 (06 Jun 2018 14:13), Max: 98.7 (05 Jun 2018 21:35)  HR: 111 (06 Jun 2018 14:13) (86 - 111)  BP: 123/73 (06 Jun 2018 14:13) (117/68 - 123/73)  BP(mean): --  RR: 18 (06 Jun 2018 14:13) (16 - 18)  SpO2: 94% (06 Jun 2018 14:13) (94% - 96%)      PHYSICAL EXAM:    Constitutional: NAD  Respiratory: ctab  Cardiovascular: S1, S2 present  Gastrointestinal: mildly distended, no tenderness, bS++  Extremities: no edema  Neurological: alert and awake  Musk: bedbound    MEDICATIONS  (STANDING):  docusate sodium 100 milliGRAM(s) Oral three times a day  enoxaparin Injectable 40 milliGRAM(s) SubCutaneous daily  lactulose Syrup 10 Gram(s) Oral three times a day  nystatin Ointment 1 Application(s) Topical two times a day  piperacillin/tazobactam IVPB. 3.375 Gram(s) IV Intermittent every 8 hours  polyethylene glycol 3350 17 Gram(s) Oral daily  risperiDONE   Tablet 1 milliGRAM(s) Oral daily  senna 2 Tablet(s) Oral at bedtime  sodium chloride 0.9%. 1000 milliLiter(s) (75 mL/Hr) IV Continuous <Continuous>    MEDICATIONS  (PRN):  acetaminophen   Tablet 650 milliGRAM(s) Oral every 6 hours PRN For Temp greater than 38 C (100.4 F)  LORazepam     Tablet 1 milliGRAM(s) Oral every 12 hours PRN Agitation  temazepam 30 milliGRAM(s) Oral at bedtime PRN Insomnia      Allergies    No Known Allergies    Intolerances        LABS: no labs today    RADIOLOGY & ADDITIONAL TESTS:  < from: Xray Abdomen 1 View PORTABLE -Urgent (06.06.18 @ 12:17) >  There is mild to moderately increased fecal content throughout the colon   but no sign of bowel obstruction. No organomegaly is seen.    There is scattered moderate lumbar spondylitic change.    IMPRESSION: Increased colonic feces.    < end of copied text >

## 2018-06-07 PROCEDURE — 74018 RADEX ABDOMEN 1 VIEW: CPT | Mod: 26

## 2018-06-07 PROCEDURE — 99232 SBSQ HOSP IP/OBS MODERATE 35: CPT | Mod: GC

## 2018-06-07 RX ORDER — ACETAMINOPHEN 500 MG
20.31 TABLET ORAL
Qty: 0 | Refills: 0 | COMMUNITY
Start: 2018-06-07

## 2018-06-07 RX ORDER — DOCUSATE SODIUM 100 MG
10 CAPSULE ORAL
Qty: 0 | Refills: 0 | COMMUNITY
Start: 2018-06-07

## 2018-06-07 RX ORDER — MORPHINE SULFATE 50 MG/1
15 CAPSULE, EXTENDED RELEASE ORAL EVERY 6 HOURS
Qty: 0 | Refills: 0 | Status: DISCONTINUED | OUTPATIENT
Start: 2018-06-07 | End: 2018-06-08

## 2018-06-07 RX ORDER — MORPHINE SULFATE 50 MG/1
2 CAPSULE, EXTENDED RELEASE ORAL EVERY 6 HOURS
Qty: 0 | Refills: 0 | Status: DISCONTINUED | OUTPATIENT
Start: 2018-06-07 | End: 2018-06-07

## 2018-06-07 RX ORDER — RISPERIDONE 4 MG/1
1 TABLET ORAL ONCE
Qty: 0 | Refills: 0 | Status: COMPLETED | OUTPATIENT
Start: 2018-06-07 | End: 2018-06-07

## 2018-06-07 RX ORDER — MORPHINE SULFATE 50 MG/1
1 CAPSULE, EXTENDED RELEASE ORAL
Qty: 0 | Refills: 0 | COMMUNITY
Start: 2018-06-07

## 2018-06-07 RX ORDER — RISPERIDONE 4 MG/1
1 TABLET ORAL DAILY
Qty: 0 | Refills: 0 | Status: DISCONTINUED | OUTPATIENT
Start: 2018-06-08 | End: 2018-06-08

## 2018-06-07 RX ORDER — MULTIVIT WITH MIN/MFOLATE/K2 340-15/3 G
300 POWDER (GRAM) ORAL ONCE
Qty: 0 | Refills: 0 | Status: COMPLETED | OUTPATIENT
Start: 2018-06-07 | End: 2018-06-07

## 2018-06-07 RX ADMIN — Medication 1 MILLIGRAM(S): at 10:53

## 2018-06-07 RX ADMIN — MORPHINE SULFATE 15 MILLIGRAM(S): 50 CAPSULE, EXTENDED RELEASE ORAL at 17:42

## 2018-06-07 RX ADMIN — Medication 100 MILLIGRAM(S): at 06:00

## 2018-06-07 RX ADMIN — CEFTRIAXONE 100 GRAM(S): 500 INJECTION, POWDER, FOR SOLUTION INTRAMUSCULAR; INTRAVENOUS at 05:30

## 2018-06-07 RX ADMIN — TEMAZEPAM 30 MILLIGRAM(S): 15 CAPSULE ORAL at 21:25

## 2018-06-07 RX ADMIN — NYSTATIN CREAM 1 APPLICATION(S): 100000 CREAM TOPICAL at 05:30

## 2018-06-07 RX ADMIN — SENNA PLUS 2 TABLET(S): 8.6 TABLET ORAL at 21:25

## 2018-06-07 RX ADMIN — Medication 100 MILLIGRAM(S): at 05:30

## 2018-06-07 RX ADMIN — RISPERIDONE 1 MILLIGRAM(S): 4 TABLET ORAL at 12:07

## 2018-06-07 RX ADMIN — Medication 650 MILLIGRAM(S): at 14:12

## 2018-06-07 RX ADMIN — Medication 300 MILLILITER(S): at 13:19

## 2018-06-07 RX ADMIN — Medication 100 MILLIGRAM(S): at 21:25

## 2018-06-07 RX ADMIN — Medication 650 MILLIGRAM(S): at 15:15

## 2018-06-07 RX ADMIN — MORPHINE SULFATE 15 MILLIGRAM(S): 50 CAPSULE, EXTENDED RELEASE ORAL at 18:20

## 2018-06-07 RX ADMIN — NYSTATIN CREAM 1 APPLICATION(S): 100000 CREAM TOPICAL at 17:42

## 2018-06-07 RX ADMIN — Medication 100 MILLIGRAM(S): at 13:19

## 2018-06-07 RX ADMIN — Medication 100 MILLIGRAM(S): at 17:37

## 2018-06-07 NOTE — DISCHARGE NOTE ADULT - PATIENT PORTAL LINK FT
You can access the [facility] Patient Portal, offered by [facility], by registering with the following website: [email or web address]

## 2018-06-07 NOTE — PROGRESS NOTE ADULT - ATTENDING COMMENTS
Plan discussed with patient's family in details at bedside and all their questions / concerns were addressed.  Patient was seen and examined by myself. Case was discussed with house staff in details. I have reviewed and agree with the plan as outlined above with edits where appropriate.  anticipate discharge in am back with home hospice.  Continue morphine for from chronic pain syndrome.  Other plan as outlined in Dr Julian note.

## 2018-06-07 NOTE — DISCHARGE NOTE ADULT - MEDICATION SUMMARY - MEDICATIONS TO STOP TAKING
I will STOP taking the medications listed below when I get home from the hospital:    clonazePAM 0.5 mg oral tablet  -- 1 tab(s) by mouth once a day, As Needed    metFORMIN 500 mg oral tablet, extended release  -- 1 tab(s) by mouth once a day    omeprazole 20 mg oral delayed release tablet  -- 1 tab(s) by mouth once a day    loratadine 10 mg oral tablet  -- 1 tab(s) by mouth once a day    cefdinir 300 mg oral capsule  -- 1 cap(s) by mouth 2 times a day   -- Finish all this medication unless otherwise directed by prescriber.    Diflucan 150 mg oral tablet  -- 1 tab(s) by mouth once a week x for 2 doses  -- Do not take this drug if you are pregnant.  Finish all this medication unless otherwise directed by prescriber.    predniSONE 20 mg oral tablet  -- 2 tab(s) by mouth once a day  -- It is very important that you take or use this exactly as directed.  Do not skip doses or discontinue unless directed by your doctor.  Obtain medical advice before taking any non-prescription drugs as some may affect the action of this medication.  Take with food or milk.    lactulose 10 g oral powder for reconstitution  -- 1 each by mouth 3 times a day

## 2018-06-07 NOTE — DISCHARGE NOTE ADULT - MEDICATION SUMMARY - MEDICATIONS TO TAKE
I will START or STAY ON the medications listed below when I get home from the hospital:    morphine 15 mg oral tablet  -- 1 tab(s) by mouth every 6 hours  -- Indication: For pain    acetaminophen 160 mg/5 mL oral suspension  -- 20.31 milliliter(s) by mouth every 6 hours, As needed, Moderate Pain (4 - 6)  -- Indication: For pain    Ativan 1 mg oral tablet  -- 1 tab(s) by mouth every 6 hours, As Needed  -- Indication: For Dementia with behavioral disturbance, unspecified dementia type    RisperDAL 1 mg oral tablet  -- 1 tab(s) by mouth once a day  -- Indication: For Dementia with behavioral disturbance, unspecified dementia type    temazepam 30 mg oral capsule  -- 1 cap(s) by mouth once a day (at bedtime)  -- Indication: For Dementia with behavioral disturbance, unspecified dementia type    magnesium citrate 1.745 g/30 mL oral liquid  -- 300 milliliter(s) by mouth once a week on monday  -- Indication: For Constipation    MiraLax oral powder for reconstitution  -- 1 packet(s) by mouth once a day  -- Indication: For Constipation    Senna 8.8 mg/5 mL oral syrup  -- 10 milliliter(s) by mouth once a day (at bedtime)  -- Indication: For Constipation    docusate sodium 10 mg/mL oral liquid  -- 10 milliliter(s) by mouth 2 times a day  -- Indication: For Constipation    lactulose 10 g/15 mL oral syrup  -- 15 milliliter(s) by mouth once a day  -- Indication: For Constipation

## 2018-06-07 NOTE — PROGRESS NOTE ADULT - SUBJECTIVE AND OBJECTIVE BOX
INTERVAL HPI/OVERNIGHT EVENTS: pt seen and examined. pt having bowel movements. no new complaints    VITAL SIGNS:  Vital Signs Last 24 Hrs  T(C): 36.2 (07 Jun 2018 05:06), Max: 36.8 (06 Jun 2018 14:13)  T(F): 97.1 (07 Jun 2018 05:06), Max: 98.3 (06 Jun 2018 14:13)  HR: 70 (07 Jun 2018 05:06) (70 - 111)  BP: 126/76 (07 Jun 2018 05:06) (123/73 - 131/59)  BP(mean): --  RR: 16 (07 Jun 2018 05:06) (16 - 18)  SpO2: 95% (07 Jun 2018 05:06) (94% - 96%)    PHYSICAL EXAM:    Constitutional: NAD  Respiratory: ctab  Cardiovascular: S1, S2 present  Gastrointestinal: mildly distended, no tenderness, bS++  Extremities: no edema  Neurological: alert and awake  Musk: bedbound    MEDICATIONS  (STANDING):  docusate sodium 100 milliGRAM(s) Oral three times a day  enoxaparin Injectable 40 milliGRAM(s) SubCutaneous daily  lactulose Syrup 10 Gram(s) Oral three times a day  nystatin Ointment 1 Application(s) Topical two times a day  piperacillin/tazobactam IVPB. 3.375 Gram(s) IV Intermittent every 8 hours  polyethylene glycol 3350 17 Gram(s) Oral daily  risperiDONE   Tablet 1 milliGRAM(s) Oral daily  senna 2 Tablet(s) Oral at bedtime  sodium chloride 0.9%. 1000 milliLiter(s) (75 mL/Hr) IV Continuous <Continuous>    MEDICATIONS  (PRN):  acetaminophen   Tablet 650 milliGRAM(s) Oral every 6 hours PRN For Temp greater than 38 C (100.4 F)  LORazepam     Tablet 1 milliGRAM(s) Oral every 12 hours PRN Agitation  temazepam 30 milliGRAM(s) Oral at bedtime PRN Insomnia      Allergies    No Known Allergies    Intolerances        LABS: no labs today as family refused    RADIOLOGY & ADDITIONAL TESTS:  < from: Xray Abdomen 1 View PORTABLE -Urgent (06.06.18 @ 12:17) >  There is mild to moderately increased fecal content throughout the colon   but no sign of bowel obstruction. No organomegaly is seen.    There is scattered moderate lumbar spondylitic change.    IMPRESSION: Increased colonic feces.    < end of copied text >

## 2018-06-07 NOTE — DISCHARGE NOTE ADULT - CARE PLAN
Principal Discharge DX:	Colitis  Goal:	to treat stercocolitis with laxatives and antibiotics  Assessment and plan of treatment:	continue stool softeners and laxatives as prescribed. completed 7 days of antibiotics  f/u PMD within 1 week  Secondary Diagnosis:	Recto-vaginal fistula  Secondary Diagnosis:	Dementia with behavioral disturbance, unspecified dementia type  Secondary Diagnosis:	Constipation, unspecified constipation type  Secondary Diagnosis:	Functional quadriplegia  Secondary Diagnosis:	Essential hypertension Principal Discharge DX:	Colitis  Goal:	to treat stercocolitis with laxatives and antibiotics  Assessment and plan of treatment:	continue stool softeners and laxatives as prescribed. completed 7 days of antibiotics  f/u PMD within 1 week  Secondary Diagnosis:	Recto-vaginal fistula  Assessment and plan of treatment:	conservative management  Secondary Diagnosis:	Dementia with behavioral disturbance, unspecified dementia type  Assessment and plan of treatment:	continue risperdal and home psych meds  Secondary Diagnosis:	Constipation, unspecified constipation type  Assessment and plan of treatment:	continue stool softeners and laxatives as prescribed  Secondary Diagnosis:	Functional quadriplegia  Secondary Diagnosis:	Essential hypertension

## 2018-06-07 NOTE — DISCHARGE NOTE ADULT - SECONDARY DIAGNOSIS.
Recto-vaginal fistula Dementia with behavioral disturbance, unspecified dementia type Constipation, unspecified constipation type Functional quadriplegia Essential hypertension

## 2018-06-07 NOTE — PROGRESS NOTE ADULT - ASSESSMENT
Patient is a 82F from home, lives with son, has HHA, nonverbal, bedbound x 3 weeks, on home hospice, DNR/DNI, with PMH dementia (worsening over past 6 mo) brought in by daughter in law Carine Younger [phone] as recommended by hospice MD due to leukocytosis, unknown number. Admitted for sepsis 2/2 stercoral colitis, r/o rectovaginal fistula.     Problem/Plan - 1:  ·  Problem: Colitis.  Plan: Stercoral colitis - c/w rocephin flagyl  blood and urine cultures negative.   pt having bowel movements with multiple laxatives, will give 1 dose of Mg citrate today as AXR showed mild to mod increase in fecal content in colon  OBGYN consulted regarding methylene blue test to be given as enema to r/o recto vaginal fistula but pt has rectal edema and inflammation so risk of perforation so not doing it, pt likely has fistula based on CT scan  Spoke to daughter in law Carine, family decided not to pursue surgery  D/C planning in AM with home hospice  Discussed plan with Attending and family     Problem/Plan - 2:  ·  Problem: Sepsis.  Plan:   will treat for stercocolitis  WBC improving   3 weeks dark vaginal discharge mixed with pus and feces  failed cipro as outpatient   c/w rocephin, flagyl to cover for / GI pathology  blood culture and urine culture neg       Problem/Plan - 3:  ·  Problem: Vaginal discharge.  Plan: plan as above       Problem/Plan - 4:  ·  Problem: Constipation.  Plan: c/w lactulose, colace, senna, miralax. 1 dose of mg citrate given     Problem/Plan - 5:  ·  Problem: Dementia with behavioral disturbance, unspecified dementia type.  Plan: dysphagia diet  nonverbal , bedbound  c/w ativan PRN, Risperdal, temazepam hs PRN for insomnia  patient is on home hospice  aspiration and fall risk  DNR/DNI.      Problem/Plan - 6:  Problem: Need for prophylactic measure. Plan: IMPROVE 2, c/w lovenox for dvt ppx

## 2018-06-07 NOTE — DISCHARGE NOTE ADULT - PLAN OF CARE
to treat stercocolitis with laxatives and antibiotics continue stool softeners and laxatives as prescribed. completed 7 days of antibiotics  f/u PMD within 1 week conservative management continue risperdal and home psych meds continue stool softeners and laxatives as prescribed

## 2018-06-07 NOTE — DISCHARGE NOTE ADULT - HOSPITAL COURSE
Patient is a 82F from home, lives with son, has HHA, nonverbal, bedbound x 3 weeks, on home hospice, DNR/DNI, with PMH dementia (worsening over past 6 mo) brought in by daughter in law Carine Younger [phone] as recommended by hospice MD due to leukocytosis, unknown number. Admitted for sepsis 2/2 stercoral colitis, r/o rectovaginal fistula.     Problem/Plan - 1:  ·  Problem: Colitis.  Plan: Stercoral colitis - c/w rocephin flagyl  blood and urine cultures negative.   pt having bowel movements with multiple laxatives, will give 1 dose of Mg citrate today as AXR showed mild to mod increase in fecal content in colon  OBGYN consulted regarding methylene blue test to be given as enema to r/o recto vaginal fistula but pt has rectal edema and inflammation so risk of perforation so not doing it, pt likely has fistula based on CT scan  Spoke to daughter in law Carine, family decided not to pursue surgery  D/C planning in AM with home hospice  Discussed plan with Attending and family     Problem/Plan - 2:  ·  Problem: Sepsis.  Plan:   will treat for stercocolitis  WBC improving   3 weeks dark vaginal discharge mixed with pus and feces  failed cipro as outpatient   c/w rocephin, flagyl to cover for / GI pathology  blood culture and urine culture neg       Problem/Plan - 3:  ·  Problem: Vaginal discharge.  Plan: plan as above       Problem/Plan - 4:  ·  Problem: Constipation.  Plan: c/w lactulose, colace, senna, miralax. 1 dose of mg citrate given     Problem/Plan - 5:  ·  Problem: Dementia with behavioral disturbance, unspecified dementia type.  Plan: dysphagia diet  nonverbal , bedbound  c/w ativan PRN, Risperdal, temazepam hs PRN for insomnia  patient is on home hospice  aspiration and fall risk  DNR/DNI. Patient is a 82F from home, lives with son, has HHA, nonverbal, bedbound x 3 weeks, on home hospice, DNR/DNI, with PMH dementia (worsening over past 6 mo) brought in by daughter in law Carine Younger [phone] as recommended by hospice MD due to leukocytosis, unknown number. Admitted for sepsis 2/2 stercoral colitis, r/o rectovaginal fistula.    1.Stercocolitis and Rectovaginal fistula:  - completed 7 days of rocephin flagyl  -blood and urine cultures negative.   -pt having bowel movements with multiple laxatives AXR showed mild to mod increase in fecal content in colon  -OBGYN consulted regarding methylene blue test to be given as enema to r/o recto vaginal fistula but pt has rectal edema and inflammation so risk of perforation so not doing it, pt likely has fistula based on CT scan  -CT abd with contrast showed The colon is filled with a large amount of stool. The rectum is markedly distended with a thickened wall and surrounding inflammatory changes and lymph nodes suggesting stercoral colitis. The uterus demonstrates air within the endometrium which is concerning for fistula.  -Surgery consulted and explained to family regarding repair of fistula and need for colostomy options, family decided not to pursue surgery.  -D/C home with home hospice    2. Dementia with behavioral disturbance: dysphagia diet  -nonverbal , bedbound  -c/w ativan PRN, Risperdal, temazepam qhs PRN for insomnia  -patient is on home hospice  -aspiration and fall risk precautions  -DNR/DNI.     Patient is stable to be discharged home with home hospice as per Attending

## 2018-06-08 VITALS — SYSTOLIC BLOOD PRESSURE: 120 MMHG | HEART RATE: 98 BPM | DIASTOLIC BLOOD PRESSURE: 64 MMHG

## 2018-06-08 PROCEDURE — 85610 PROTHROMBIN TIME: CPT

## 2018-06-08 PROCEDURE — 99239 HOSP IP/OBS DSCHRG MGMT >30: CPT

## 2018-06-08 PROCEDURE — 71045 X-RAY EXAM CHEST 1 VIEW: CPT

## 2018-06-08 PROCEDURE — 85027 COMPLETE CBC AUTOMATED: CPT

## 2018-06-08 PROCEDURE — 83036 HEMOGLOBIN GLYCOSYLATED A1C: CPT

## 2018-06-08 PROCEDURE — 80048 BASIC METABOLIC PNL TOTAL CA: CPT

## 2018-06-08 PROCEDURE — 84443 ASSAY THYROID STIM HORMONE: CPT

## 2018-06-08 PROCEDURE — 81001 URINALYSIS AUTO W/SCOPE: CPT

## 2018-06-08 PROCEDURE — 82962 GLUCOSE BLOOD TEST: CPT

## 2018-06-08 PROCEDURE — 87186 SC STD MICRODIL/AGAR DIL: CPT

## 2018-06-08 PROCEDURE — 74018 RADEX ABDOMEN 1 VIEW: CPT

## 2018-06-08 PROCEDURE — 83605 ASSAY OF LACTIC ACID: CPT

## 2018-06-08 PROCEDURE — 99285 EMERGENCY DEPT VISIT HI MDM: CPT | Mod: 25

## 2018-06-08 PROCEDURE — 87040 BLOOD CULTURE FOR BACTERIA: CPT

## 2018-06-08 PROCEDURE — 84100 ASSAY OF PHOSPHORUS: CPT

## 2018-06-08 PROCEDURE — 74177 CT ABD & PELVIS W/CONTRAST: CPT

## 2018-06-08 PROCEDURE — 80053 COMPREHEN METABOLIC PANEL: CPT

## 2018-06-08 PROCEDURE — 82746 ASSAY OF FOLIC ACID SERUM: CPT

## 2018-06-08 PROCEDURE — 96375 TX/PRO/DX INJ NEW DRUG ADDON: CPT

## 2018-06-08 PROCEDURE — 87086 URINE CULTURE/COLONY COUNT: CPT

## 2018-06-08 PROCEDURE — 93005 ELECTROCARDIOGRAM TRACING: CPT

## 2018-06-08 PROCEDURE — 82306 VITAMIN D 25 HYDROXY: CPT

## 2018-06-08 PROCEDURE — 83735 ASSAY OF MAGNESIUM: CPT

## 2018-06-08 PROCEDURE — 82607 VITAMIN B-12: CPT

## 2018-06-08 PROCEDURE — 96374 THER/PROPH/DIAG INJ IV PUSH: CPT

## 2018-06-08 RX ORDER — LACTULOSE 10 G/15ML
15 SOLUTION ORAL
Qty: 450 | Refills: 0 | OUTPATIENT
Start: 2018-06-08 | End: 2018-07-07

## 2018-06-08 RX ORDER — POLYETHYLENE GLYCOL 3350 17 G/17G
1 POWDER, FOR SOLUTION ORAL
Qty: 30 | Refills: 0 | OUTPATIENT
Start: 2018-06-08 | End: 2018-07-07

## 2018-06-08 RX ORDER — MULTIVIT WITH MIN/MFOLATE/K2 340-15/3 G
300 POWDER (GRAM) ORAL
Qty: 0 | Refills: 0 | COMMUNITY

## 2018-06-08 RX ORDER — MULTIVIT WITH MIN/MFOLATE/K2 340-15/3 G
300 POWDER (GRAM) ORAL
Qty: 1200 | Refills: 0 | OUTPATIENT
Start: 2018-06-08 | End: 2018-07-07

## 2018-06-08 RX ORDER — RISPERIDONE 4 MG/1
1 TABLET ORAL
Qty: 0 | Refills: 0 | COMMUNITY

## 2018-06-08 RX ORDER — SENNA PLUS 8.6 MG/1
10 TABLET ORAL
Qty: 300 | Refills: 0 | OUTPATIENT
Start: 2018-06-08 | End: 2018-07-07

## 2018-06-08 RX ORDER — RISPERIDONE 4 MG/1
1 TABLET ORAL
Qty: 30 | Refills: 0 | OUTPATIENT
Start: 2018-06-08 | End: 2018-07-07

## 2018-06-08 RX ORDER — POLYETHYLENE GLYCOL 3350 17 G/17G
1 POWDER, FOR SOLUTION ORAL
Qty: 0 | Refills: 0 | COMMUNITY

## 2018-06-08 RX ORDER — DOCUSATE SODIUM 100 MG
10 CAPSULE ORAL
Qty: 600 | Refills: 0 | OUTPATIENT
Start: 2018-06-08 | End: 2018-07-07

## 2018-06-08 RX ORDER — LACTULOSE 10 G/15ML
15 SOLUTION ORAL
Qty: 0 | Refills: 0 | COMMUNITY

## 2018-06-08 RX ORDER — SENNA PLUS 8.6 MG/1
10 TABLET ORAL
Qty: 0 | Refills: 0 | COMMUNITY

## 2018-06-08 RX ADMIN — Medication 1 MILLIGRAM(S): at 21:56

## 2018-06-08 RX ADMIN — MORPHINE SULFATE 15 MILLIGRAM(S): 50 CAPSULE, EXTENDED RELEASE ORAL at 18:04

## 2018-06-08 RX ADMIN — MORPHINE SULFATE 15 MILLIGRAM(S): 50 CAPSULE, EXTENDED RELEASE ORAL at 05:21

## 2018-06-08 RX ADMIN — MORPHINE SULFATE 15 MILLIGRAM(S): 50 CAPSULE, EXTENDED RELEASE ORAL at 05:51

## 2018-06-08 RX ADMIN — MORPHINE SULFATE 15 MILLIGRAM(S): 50 CAPSULE, EXTENDED RELEASE ORAL at 11:30

## 2018-06-08 RX ADMIN — NYSTATIN CREAM 1 APPLICATION(S): 100000 CREAM TOPICAL at 05:22

## 2018-06-08 RX ADMIN — Medication 100 MILLIGRAM(S): at 13:11

## 2018-06-08 RX ADMIN — SENNA PLUS 2 TABLET(S): 8.6 TABLET ORAL at 21:57

## 2018-06-08 RX ADMIN — Medication 100 MILLIGRAM(S): at 05:21

## 2018-06-08 RX ADMIN — Medication 100 MILLIGRAM(S): at 17:08

## 2018-06-08 RX ADMIN — MORPHINE SULFATE 15 MILLIGRAM(S): 50 CAPSULE, EXTENDED RELEASE ORAL at 12:00

## 2018-06-08 RX ADMIN — NYSTATIN CREAM 1 APPLICATION(S): 100000 CREAM TOPICAL at 17:08

## 2018-06-08 RX ADMIN — RISPERIDONE 1 MILLIGRAM(S): 4 TABLET ORAL at 11:22

## 2018-06-08 RX ADMIN — CEFTRIAXONE 100 GRAM(S): 500 INJECTION, POWDER, FOR SOLUTION INTRAMUSCULAR; INTRAVENOUS at 05:20

## 2018-06-08 NOTE — PROGRESS NOTE ADULT - PROBLEM SELECTOR PLAN 2
supportive measures  Continue Risperdal  PRN ativan  Pain control- add morphine ATC with PRN Tylenol as she is crying and per family cries when she is uncomfortable. She takes Morphine at home for comfort as she was on home hospice.
supportive measures  Continue Risperdal  PRN ativan  Pain control- add morphine ATC with PRN Tylenol as she is crying and per family cries when she is uncomfortable. She takes Morphine at home for comfort as she was on home hospice.
supportive measures  Continue Risperdal and PRN ativan  Pain control with morphine  with PRN Tylenol
continue bowel regimen.  HAd 2 large BMs today

## 2018-06-08 NOTE — PROGRESS NOTE ADULT - PROBLEM SELECTOR PLAN 4
Having bowel movement with current regimen.  Monitor  Follow up repeat Xray
Having bowel movement with current regimen.  but still with significant amount of stool. Mag citrate therapy again today. Will discharge with daily bowel regimen and mag citrate once weekly and PRN.  Dietary counseling and appropriate fiber rich diet done
continue with bowel regimen
BP stable  Monitor

## 2018-06-08 NOTE — PROGRESS NOTE ADULT - PROBLEM SELECTOR PROBLEM 2
Dementia with behavioral disturbance, unspecified dementia type
Constipation

## 2018-06-08 NOTE — PROGRESS NOTE ADULT - SUBJECTIVE AND OBJECTIVE BOX
MEDICAL ATTENDING NOTE- LATE ENTRY NOTE Patient was seen at about 1pm and  multiple times during the course of today in preparation for discharge home with hospice care    Patient is a 82y old  Female who presents with a chief complaint of dark vaginal discharge (07 Jun 2018 16:09)      INTERVAL HPI/ OVERNIGHT EVENTS: no new complaints per nursing staff or family at bedside.    MEDICATIONS  (STANDING):  cefTRIAXone   IVPB 1 Gram(s) IV Intermittent every 24 hours  docusate sodium Liquid 100 milliGRAM(s) Oral two times a day  enoxaparin Injectable 40 milliGRAM(s) SubCutaneous daily  LORazepam   Injectable 1 milliGRAM(s) IV Push once  metroNIDAZOLE  IVPB 500 milliGRAM(s) IV Intermittent every 8 hours  morphine  IR 15 milliGRAM(s) Oral every 6 hours  nystatin Ointment 1 Application(s) Topical two times a day  risperiDONE   Tablet 1 milliGRAM(s) Oral daily  senna 2 Tablet(s) Oral at bedtime    MEDICATIONS  (PRN):  acetaminophen    Suspension. 650 milliGRAM(s) Oral every 6 hours PRN Moderate Pain (4 - 6)  temazepam 30 milliGRAM(s) Oral at bedtime PRN Insomnia      __________________________________________________  ----------------------------------------------------------------------------------  REVIEW OF SYSTEMS: no fever, no SOB, No communicative    Vital Signs Last 24 Hrs  T(C): 37.1 (08 Jun 2018 16:31), Max: 37.1 (08 Jun 2018 16:31)  T(F): 98.8 (08 Jun 2018 16:31), Max: 98.8 (08 Jun 2018 16:31)  HR: 98 (08 Jun 2018 21:53) (68 - 98)  BP: 120/64 (08 Jun 2018 21:53) (120/64 - 137/93)  BP(mean): --  RR: 18 (08 Jun 2018 16:31) (18 - 18)  SpO2: 99% (08 Jun 2018 16:31) (95% - 99%)    _________________  PHYSICAL EXAM:  ---------------------------   NAD; Normocephalic;   LUNGS - no wheezing  HEART: S1 S2+   ABDOMEN: Soft, Nontender, non distended; BS+  EXTREMITIES: no cyanosis; no edema  NERVOUS SYSTEM:  Awake and alert; no new deficits    _________________________________________________  LABS:              CAPILLARY BLOOD GLUCOSE      POCT Blood Glucose.: 128 mg/dL (08 Jun 2018 11:36)            Care Discussed with Consultants :     Plan of care was discussed with patient ; all questions and concerns were addressed and care was aligned with patient's wishes.  Patient has been advised to follow up with PMD upon discharge from the hospital.- home hospice team will followup with her in the community.  Discharge plans discussed with nursing staff ,  and .

## 2018-06-08 NOTE — PROGRESS NOTE ADULT - PROBLEM SELECTOR PROBLEM 3
Dementia with behavioral disturbance, unspecified dementia type
Recto-vaginal fistula

## 2018-06-08 NOTE — PROGRESS NOTE ADULT - PROBLEM SELECTOR PLAN 6
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

## 2018-06-08 NOTE — PROGRESS NOTE ADULT - ASSESSMENT
Patient is a 82 F from home, lives with son, has HHA, nonverbal, bedbound x 3 weeks, on home hospice, DNR/DNI, with PMH dementia (worsening over past 6 mo) brought in by daughter in law Carine Younger [phone] as recommended by hospice MD due to leukocytosis,  Admitted for sepsis due to stercoral colitis,  and recto-vaginal fistula.

## 2018-07-01 ENCOUNTER — OUTPATIENT (OUTPATIENT)
Dept: OUTPATIENT SERVICES | Facility: HOSPITAL | Age: 83
LOS: 1 days | End: 2018-07-01
Payer: MEDICARE

## 2018-07-03 DIAGNOSIS — Z71.89 OTHER SPECIFIED COUNSELING: ICD-10-CM

## 2018-07-25 ENCOUNTER — INPATIENT (INPATIENT)
Facility: HOSPITAL | Age: 83
LOS: 7 days | Discharge: HOSPICE | DRG: 981 | End: 2018-08-02
Attending: INTERNAL MEDICINE | Admitting: INTERNAL MEDICINE
Payer: MEDICARE

## 2018-07-25 VITALS
OXYGEN SATURATION: 95 % | WEIGHT: 110.01 LBS | SYSTOLIC BLOOD PRESSURE: 71 MMHG | HEIGHT: 61 IN | RESPIRATION RATE: 20 BRPM | DIASTOLIC BLOOD PRESSURE: 43 MMHG | HEART RATE: 95 BPM

## 2018-07-25 DIAGNOSIS — N76.2 ACUTE VULVITIS: ICD-10-CM

## 2018-07-25 DIAGNOSIS — Z29.9 ENCOUNTER FOR PROPHYLACTIC MEASURES, UNSPECIFIED: ICD-10-CM

## 2018-07-25 DIAGNOSIS — F03.91 UNSPECIFIED DEMENTIA WITH BEHAVIORAL DISTURBANCE: ICD-10-CM

## 2018-07-25 DIAGNOSIS — K52.9 NONINFECTIVE GASTROENTERITIS AND COLITIS, UNSPECIFIED: ICD-10-CM

## 2018-07-25 LAB
ALBUMIN SERPL ELPH-MCNC: 2.2 G/DL — LOW (ref 3.5–5)
ALP SERPL-CCNC: 64 U/L — SIGNIFICANT CHANGE UP (ref 40–120)
ALT FLD-CCNC: 15 U/L DA — SIGNIFICANT CHANGE UP (ref 10–60)
ANION GAP SERPL CALC-SCNC: 7 MMOL/L — SIGNIFICANT CHANGE UP (ref 5–17)
AST SERPL-CCNC: 11 U/L — SIGNIFICANT CHANGE UP (ref 10–40)
BASOPHILS # BLD AUTO: 0.1 K/UL — SIGNIFICANT CHANGE UP (ref 0–0.2)
BASOPHILS NFR BLD AUTO: 0.5 % — SIGNIFICANT CHANGE UP (ref 0–2)
BILIRUB SERPL-MCNC: 0.4 MG/DL — SIGNIFICANT CHANGE UP (ref 0.2–1.2)
BUN SERPL-MCNC: 23 MG/DL — HIGH (ref 7–18)
CALCIUM SERPL-MCNC: 9 MG/DL — SIGNIFICANT CHANGE UP (ref 8.4–10.5)
CHLORIDE SERPL-SCNC: 106 MMOL/L — SIGNIFICANT CHANGE UP (ref 96–108)
CO2 SERPL-SCNC: 30 MMOL/L — SIGNIFICANT CHANGE UP (ref 22–31)
CREAT SERPL-MCNC: 0.66 MG/DL — SIGNIFICANT CHANGE UP (ref 0.5–1.3)
EOSINOPHIL # BLD AUTO: 0.2 K/UL — SIGNIFICANT CHANGE UP (ref 0–0.5)
EOSINOPHIL NFR BLD AUTO: 1.5 % — SIGNIFICANT CHANGE UP (ref 0–6)
GLUCOSE SERPL-MCNC: 113 MG/DL — HIGH (ref 70–99)
HCT VFR BLD CALC: 37.3 % — SIGNIFICANT CHANGE UP (ref 34.5–45)
HGB BLD-MCNC: 11.9 G/DL — SIGNIFICANT CHANGE UP (ref 11.5–15.5)
LACTATE SERPL-SCNC: 1.9 MMOL/L — SIGNIFICANT CHANGE UP (ref 0.7–2)
LYMPHOCYTES # BLD AUTO: 27.4 % — SIGNIFICANT CHANGE UP (ref 13–44)
LYMPHOCYTES # BLD AUTO: 3.7 K/UL — HIGH (ref 1–3.3)
MCHC RBC-ENTMCNC: 27.1 PG — SIGNIFICANT CHANGE UP (ref 27–34)
MCHC RBC-ENTMCNC: 31.9 GM/DL — LOW (ref 32–36)
MCV RBC AUTO: 85.1 FL — SIGNIFICANT CHANGE UP (ref 80–100)
MONOCYTES # BLD AUTO: 1 K/UL — HIGH (ref 0–0.9)
MONOCYTES NFR BLD AUTO: 7.2 % — SIGNIFICANT CHANGE UP (ref 2–14)
NEUTROPHILS # BLD AUTO: 8.7 K/UL — HIGH (ref 1.8–7.4)
NEUTROPHILS NFR BLD AUTO: 63.5 % — SIGNIFICANT CHANGE UP (ref 43–77)
PLATELET # BLD AUTO: 420 K/UL — HIGH (ref 150–400)
POTASSIUM SERPL-MCNC: 3.9 MMOL/L — SIGNIFICANT CHANGE UP (ref 3.5–5.3)
POTASSIUM SERPL-SCNC: 3.9 MMOL/L — SIGNIFICANT CHANGE UP (ref 3.5–5.3)
PROT SERPL-MCNC: 7.9 G/DL — SIGNIFICANT CHANGE UP (ref 6–8.3)
RBC # BLD: 4.38 M/UL — SIGNIFICANT CHANGE UP (ref 3.8–5.2)
RBC # FLD: 13.6 % — SIGNIFICANT CHANGE UP (ref 10.3–14.5)
SODIUM SERPL-SCNC: 143 MMOL/L — SIGNIFICANT CHANGE UP (ref 135–145)
WBC # BLD: 13.7 K/UL — HIGH (ref 3.8–10.5)
WBC # FLD AUTO: 13.7 K/UL — HIGH (ref 3.8–10.5)

## 2018-07-25 PROCEDURE — 99291 CRITICAL CARE FIRST HOUR: CPT

## 2018-07-25 PROCEDURE — 74176 CT ABD & PELVIS W/O CONTRAST: CPT | Mod: 26

## 2018-07-25 RX ORDER — SODIUM CHLORIDE 9 MG/ML
1500 INJECTION INTRAMUSCULAR; INTRAVENOUS; SUBCUTANEOUS ONCE
Qty: 0 | Refills: 0 | Status: COMPLETED | OUTPATIENT
Start: 2018-07-25 | End: 2018-07-25

## 2018-07-25 RX ORDER — SODIUM CHLORIDE 9 MG/ML
1000 INJECTION, SOLUTION INTRAVENOUS
Qty: 0 | Refills: 0 | Status: DISCONTINUED | OUTPATIENT
Start: 2018-07-25 | End: 2018-08-02

## 2018-07-25 RX ORDER — METRONIDAZOLE 500 MG
500 TABLET ORAL EVERY 8 HOURS
Qty: 0 | Refills: 0 | Status: DISCONTINUED | OUTPATIENT
Start: 2018-07-25 | End: 2018-07-27

## 2018-07-25 RX ORDER — ACETAMINOPHEN 500 MG
650 TABLET ORAL ONCE
Qty: 0 | Refills: 0 | Status: COMPLETED | OUTPATIENT
Start: 2018-07-25 | End: 2018-07-25

## 2018-07-25 RX ORDER — TEMAZEPAM 15 MG/1
30 CAPSULE ORAL AT BEDTIME
Qty: 0 | Refills: 0 | Status: DISCONTINUED | OUTPATIENT
Start: 2018-07-25 | End: 2018-08-01

## 2018-07-25 RX ORDER — TEMAZEPAM 15 MG/1
1 CAPSULE ORAL
Qty: 0 | Refills: 0 | COMMUNITY

## 2018-07-25 RX ORDER — RISPERIDONE 4 MG/1
1 TABLET ORAL DAILY
Qty: 0 | Refills: 0 | Status: DISCONTINUED | OUTPATIENT
Start: 2018-07-25 | End: 2018-08-02

## 2018-07-25 RX ORDER — MORPHINE SULFATE 50 MG/1
2 CAPSULE, EXTENDED RELEASE ORAL EVERY 6 HOURS
Qty: 0 | Refills: 0 | Status: DISCONTINUED | OUTPATIENT
Start: 2018-07-25 | End: 2018-07-26

## 2018-07-25 RX ORDER — SODIUM CHLORIDE 9 MG/ML
3 INJECTION INTRAMUSCULAR; INTRAVENOUS; SUBCUTANEOUS ONCE
Qty: 0 | Refills: 0 | Status: COMPLETED | OUTPATIENT
Start: 2018-07-25 | End: 2018-07-25

## 2018-07-25 RX ORDER — ENOXAPARIN SODIUM 100 MG/ML
40 INJECTION SUBCUTANEOUS DAILY
Qty: 0 | Refills: 0 | Status: DISCONTINUED | OUTPATIENT
Start: 2018-07-25 | End: 2018-08-02

## 2018-07-25 RX ORDER — ACETAMINOPHEN 500 MG
650 TABLET ORAL EVERY 6 HOURS
Qty: 0 | Refills: 0 | Status: DISCONTINUED | OUTPATIENT
Start: 2018-07-25 | End: 2018-08-02

## 2018-07-25 RX ORDER — PIPERACILLIN AND TAZOBACTAM 4; .5 G/20ML; G/20ML
3.38 INJECTION, POWDER, LYOPHILIZED, FOR SOLUTION INTRAVENOUS ONCE
Qty: 0 | Refills: 0 | Status: COMPLETED | OUTPATIENT
Start: 2018-07-25 | End: 2018-07-25

## 2018-07-25 RX ORDER — CEFTRIAXONE 500 MG/1
1 INJECTION, POWDER, FOR SOLUTION INTRAMUSCULAR; INTRAVENOUS EVERY 24 HOURS
Qty: 0 | Refills: 0 | Status: DISCONTINUED | OUTPATIENT
Start: 2018-07-25 | End: 2018-07-27

## 2018-07-25 RX ORDER — VANCOMYCIN HCL 1 G
1000 VIAL (EA) INTRAVENOUS ONCE
Qty: 0 | Refills: 0 | Status: COMPLETED | OUTPATIENT
Start: 2018-07-25 | End: 2018-07-25

## 2018-07-25 RX ADMIN — SODIUM CHLORIDE 100 MILLILITER(S): 9 INJECTION, SOLUTION INTRAVENOUS at 23:16

## 2018-07-25 RX ADMIN — Medication 100 MILLIGRAM(S): at 23:13

## 2018-07-25 RX ADMIN — Medication 650 MILLIGRAM(S): at 20:38

## 2018-07-25 RX ADMIN — SODIUM CHLORIDE 3 MILLILITER(S): 9 INJECTION INTRAMUSCULAR; INTRAVENOUS; SUBCUTANEOUS at 20:30

## 2018-07-25 RX ADMIN — PIPERACILLIN AND TAZOBACTAM 200 GRAM(S): 4; .5 INJECTION, POWDER, LYOPHILIZED, FOR SOLUTION INTRAVENOUS at 20:38

## 2018-07-25 RX ADMIN — Medication 250 MILLIGRAM(S): at 20:38

## 2018-07-25 RX ADMIN — SODIUM CHLORIDE 1500 MILLILITER(S): 9 INJECTION INTRAMUSCULAR; INTRAVENOUS; SUBCUTANEOUS at 20:37

## 2018-07-25 NOTE — H&P ADULT - HISTORY OF PRESENT ILLNESS
82 F from home, lives with son, has HHA, nonverbal, bedbound on home hospice, DNR/DNI, with PMH Dementia brought in by daughter in law Carine Younger [phone] for hypotension and fever x 5 days associated w/ vaginal discharge white mucus characteristic. Pt is having 5 movements via the known rectovaginal fistula, small, and non watery - family stopped giving Lactulose and Ativan past few days. Spoke w/ daughter in law confirming DNR/DNI and no central line or pressors - family is currently considering a surgical intervention for the rectovaginal fistula once the patient is more stable. Otherwise family denies any recent travel, sick contacts, vomiting, SOB, or any other complaints. 82 F from home, lives with son, has HHA, nonverbal, bedbound on home hospice, DNR/DNI, with PMH Dementia brought in by daughter in law Carine Younger [phone] for hypotension and fever x 5 days associated w/ vaginal discharge white mucus characteristic. Pt is having 5 movements via the known rectovaginal fistula, small, and non watery - family stopped giving Lactulose and Ativan past few days. Spoke w/ daughter in law confirming DNR/DNI and no central line or pressors - family is currently considering a surgical intervention for the rectovaginal fistula once the patient is more stable. Otherwise family denies any recent travel, sick contacts, vomiting, SOB, or any other complaints. Pt recently discharged in June 2018 for sepsis 2/2 stercoral colitis - completed 7 days of Rocephin and Flagyl - Gynecology consulted regarding methylene blue test to be given as enema to r/o rectovaginal fistula but Pt had rectal edema and inflammation so risk of perforation was deemed too high and family decided not to pursue surgery.

## 2018-07-25 NOTE — H&P ADULT - ASSESSMENT
82 F from home, lives with son, has HHA, nonverbal, bedbound on home hospice, DNR/DNI, with PMH Dementia brought in by daughter in law Carine Younger [phone] for hypotension and fever x 5 days associated w/ vaginal discharge white mucus characteristics - admitting for concern of infectious colitis complicated w/ rectovaginal fistula

## 2018-07-25 NOTE — H&P ADULT - PROBLEM SELECTOR PLAN 1
P/w F x 5 days w/ increased rectovaginal output and tender abdominal exam  - Low grade F 37.8 w/ WBC 15K and hypotension on admission 71/43  - In ED received dose of Vancomycin and Zosyn w/ 1.5 L NS w/ improvement of BP to 115/55  - C/w Rocephin and Flagyl x 7/25; prior UCx E. coli  - NPO and PRN Morphine 1 mg Q4 (home dose 15 mg PO)  - Unable to send C. diff 2/2 non watery stool  ***F/u UA/UCx, BCx, CT abd/pelvis, and OBGYN consult

## 2018-07-25 NOTE — H&P ADULT - NSHPPHYSICALEXAM_GEN_ALL_CORE
T(C): 37.8 (25 Jul 2018 19:37), Max: 37.8 (25 Jul 2018 19:37)  T(F): 100 (25 Jul 2018 19:37), Max: 100 (25 Jul 2018 19:37)  HR: 95 (25 Jul 2018 19:15) (95 - 95)  BP: 71/43 (25 Jul 2018 19:15) (71/43 - 71/43)  RR: 20 (25 Jul 2018 19:15) (20 - 20)  SpO2: 95% (25 Jul 2018 19:15) (95% - 95%)

## 2018-07-25 NOTE — H&P ADULT - PROBLEM SELECTOR PLAN 3
IMPROVE VTE Individual Risk Assessment    RISK                                                          Points  [] Previous VTE                                           3  [] Thrombophilia                                        2  [] Lower limb paralysis                              2   [] Current Cancer                                       2   [x] Immobilization > 24 hrs                        1  [] ICU/CCU stay > 24 hours                       1  [x] Age > 60                                                   1    IMPROVE VTE Score: 2, DVT PPx w/ Lovenox

## 2018-07-25 NOTE — H&P ADULT - ATTENDING COMMENTS
Patient was discussed with Dr. Yang and examined at the bedside earlier today.     She was brought by family because of hypotension and fever.     Patient has a history of dementia.  She is bedbound on home hospice.  She is known to have a colo-vaginal fistula. Recently all stool has been seen via vagina.  Mucous has been present.    At present, daughter (and son) in law would like to pursue the surgical option they had declined on the last admission.     Alert, non-verbal 83 yo woman  Vital Signs Last 24 Hrs  T(C): 37 (2018 14:52), Max: 37.8 (2018 19:37)  T(F): 98.6 (2018 14:52), Max: 100 (2018 19:37)  HR: 76 (2018 14:52) (76 - 95)  BP: 104/57 (2018 14:52) (71/43 - 104/57)  BP(mean): --  RR: 16 (2018 14:52) (16 - 20)  SpO2: 98% (2018 14:52) (95% - 100%)  Lungs, clear  Cor, RRR  Abdomen, suprapubic tenderness. bs present  External genitalia, fullness of both labiae majorae and clitoris  Neurological, alert, non-verbal                        9.4    11.2  )-----------( 325      ( 2018 08:48 )             30.2         144  |  113<H>  |  14  ----------------------------<  134<H>  3.3<L>   |  25  |  0.43<L>    Ca    7.9<L>      2018 08:48  Phos  2.0       Mg     2.1         TPro  6.3  /  Alb  1.7<L>  /  TBili  0.3  /  DBili  x   /  AST  10  /  ALT  12  /  AlkPhos  56          Urinalysis Basic - ( 2018 01:51 )    Color: Yellow / Appearance: Clear / S.005 / pH: x  Gluc: x / Ketone: Negative  / Bili: Negative / Urobili: Negative   Blood: x / Protein: Negative / Nitrite: Negative   Leuk Esterase: Trace / RBC: 0-2 /HPF / WBC 0-2 /HPF   Sq Epi: x / Non Sq Epi: Occasional /HPF / Bacteria: x    Lactate Trend   @ 19:52 Lactate:1.9       CARDIAC MARKERS ( 2018 19:52 )  <0.015 ng/mL / x     / x     / x     / x      POCT Blood Glucose.: 114 mg/dL (2018 19:24)  < from: 12 Lead ECG (18 @ 19:47) >    Diagnosis Line Normal sinus rhythm  Left anterior fascicular block  Inferior infarct , age undetermined  Possible Anterior infarct , age undetermined  Abnormal ECG  Confirmed by RAMYA HERRERA MD ([id number]) on 2018 11:55:43    < end of copied text >    < from: CT Abdomen and Pelvis No Cont (18 @ 22:16) >    Impression: No evidence for free air within the abdomen.    Moderate to large amount of stool in the colon. Large amount of stool in   the rectum with mild rectal wall thickening, perirectal stranding and a   mildly enlarged 1.2 cm left perirectal lymph node may represent   associated stercoral colitis. Underlying rectal cancer cannot be excluded.    Air within the vagina, cervix and endometrium again noted, nonspecific.    Trace bilateral pleural effusions, improved.    Small collapsed hiatal hernia versus distal esophageal mural thickening.   EGD may be pursued for further evaluation.    Other findings as above.    < end of copied text > Patient was discussed with Dr. Yang and examined at the bedside earlier today.     She was brought by family because of hypotension and fever.     Patient has a history of dementia.  She is bedbound on home hospice.  She is known to have a colo-vaginal fistula. Recently all stool has been seen via vagina.  Mucous has been present.    At present, daughter (and son) in law would like to pursue the surgical option they had declined on the last admission.     Alert, non-verbal 81 yo woman  Vital Signs Last 24 Hrs  T(C): 37 (2018 14:52), Max: 37.8 (2018 19:37)  T(F): 98.6 (2018 14:52), Max: 100 (2018 19:37)  HR: 76 (2018 14:52) (76 - 95)  BP: 104/57 (2018 14:52) (71/43 - 104/57)  BP(mean): --  RR: 16 (2018 14:52) (16 - 20)  SpO2: 98% (2018 14:52) (95% - 100%)  Lungs, clear  Cor, RRR  Abdomen, suprapubic tenderness. bs present  External genitalia, fullness of both labiae majorae and clitoris  Neurological, alert, non-verbal                        9.4    11.2  )-----------( 325      ( 2018 08:48 )             30.2         144  |  113<H>  |  14  ----------------------------<  134<H>  3.3<L>   |  25  |  0.43<L>    Ca    7.9<L>      2018 08:48  Phos  2.0       Mg     2.1         TPro  6.3  /  Alb  1.7<L>  /  TBili  0.3  /  DBili  x   /  AST  10  /  ALT  12  /  AlkPhos  56          Urinalysis Basic - ( 2018 01:51 )    Color: Yellow / Appearance: Clear / S.005 / pH: x  Gluc: x / Ketone: Negative  / Bili: Negative / Urobili: Negative   Blood: x / Protein: Negative / Nitrite: Negative   Leuk Esterase: Trace / RBC: 0-2 /HPF / WBC 0-2 /HPF   Sq Epi: x / Non Sq Epi: Occasional /HPF / Bacteria: x    Lactate Trend   @ 19:52 Lactate:1.9       CARDIAC MARKERS ( 2018 19:52 )  <0.015 ng/mL / x     / x     / x     / x      POCT Blood Glucose.: 114 mg/dL (2018 19:24)  < from: 12 Lead ECG (18 @ 19:47) >    Diagnosis Line Normal sinus rhythm  Left anterior fascicular block  Inferior infarct , age undetermined  Possible Anterior infarct , age undetermined  Abnormal ECG  Confirmed by RAMYA HERRERA MD ([id number]) on 2018 11:55:43    < end of copied text >    < from: CT Abdomen and Pelvis No Cont (18 @ 22:16) >    Impression: No evidence for free air within the abdomen.    Moderate to large amount of stool in the colon. Large amount of stool in   the rectum with mild rectal wall thickening, perirectal stranding and a   mildly enlarged 1.2 cm left perirectal lymph node may represent   associated stercoral colitis. Underlying rectal cancer cannot be excluded.    Air within the vagina, cervix and endometrium again noted, nonspecific.    Trace bilateral pleural effusions, improved.    Small collapsed hiatal hernia versus distal esophageal mural thickening.   EGD may be pursued for further evaluation.    Other findings as above.    < end of copied text >    IMP:  Hypotension and fever have responded to fluids and empirical antibiotics.           Suprapubic tenderness and fullness of the labiae majorae, likely related to colitis, with cellulitis of the vulva secondary to stool contamination.   Plan: GYN consultation, Dr. White will see patient to evaluate for possible surgical correction of the fistula.           He has asked to have colonoscopy to evaluate whether the colonic lesion is inflammatory or malignant.           I have asked Dr. Orta to see the patient.           ID consultation to consider appropriate antibiotics for vulvar cellulitis in this patient.          Analgesia, as needed.

## 2018-07-25 NOTE — ED PROVIDER NOTE - OBJECTIVE STATEMENT
81 y/o F w/ PMHx of dementia, HTN, vaginal rectal fistula, BIB EMS, from home for fever. Pt was found to be hypotensive by EMS. Family reports fecal output from vag fistula. Confirms pt is on home hospice with Dr. Booth. Family is clear pt is DNR/DNI. Agrees to IV fluids, abx, lab draws. She has been on Amoxicillin for the past several days, for possible vaginal infection. Also on Morphine q2 hours. NKDA.

## 2018-07-25 NOTE — H&P ADULT - NSHPLABSRESULTS_GEN_ALL_CORE
CBC Full  -  ( 25 Jul 2018 19:52 )  WBC Count : 13.7 K/uL  Hemoglobin : 11.9 g/dL  Hematocrit : 37.3 %  Platelet Count - Automated : 420 K/uL  Mean Cell Volume : 85.1 fl  Mean Cell Hemoglobin : 27.1 pg  Mean Cell Hemoglobin Concentration : 31.9 gm/dL  Auto Neutrophil # : 8.7 K/uL  Auto Lymphocyte # : 3.7 K/uL  Auto Monocyte # : 1.0 K/uL  Auto Eosinophil # : 0.2 K/uL  Auto Basophil # : 0.1 K/uL  Auto Neutrophil % : 63.5 %  Auto Lymphocyte % : 27.4 %  Auto Monocyte % : 7.2 %  Auto Eosinophil % : 1.5 %  Auto Basophil % : 0.5 %    07-25    143  |  106  |  23<H>  ----------------------------<  113<H>  3.9   |  30  |  0.66    Ca    9.0      25 Jul 2018 19:52    TPro  7.9  /  Alb  2.2<L>  /  TBili  0.4  /  DBili  x   /  AST  11  /  ALT  15  /  AlkPhos  64  07-25      RADIOLOGY & ADDITIONAL STUDIES (The following images were personally reviewed):    Pending CT abdomen results

## 2018-07-25 NOTE — ED PROVIDER NOTE - CARE PLAN
Principal Discharge DX:	Vulvar cellulitis  Secondary Diagnosis:	Hypotension, unspecified hypotension type

## 2018-07-25 NOTE — ED PROVIDER NOTE - MEDICAL DECISION MAKING DETAILS
81 y/o F, on home hospice care, DNR/DNI, here w/ fever. Likely source is vaginal infection. Will activate code sepsis. Will give broad spectrum IV abx, IV fluids, and address hypotension. Will check labs, cultures and anticipate admission. Attempting to reach Dr. Booth.

## 2018-07-25 NOTE — ED PROVIDER NOTE - PROGRESS NOTE DETAILS
Spoke with Dr. Reynoso and she says this Pt will be admitted to hospitalist--Dr. Lennon informed and accepted.  CATE Roche informed.  Adriana mercedes for hospice.

## 2018-07-26 PROBLEM — F03.91 UNSPECIFIED DEMENTIA WITH BEHAVIORAL DISTURBANCE: Chronic | Status: ACTIVE | Noted: 2017-05-10

## 2018-07-26 PROBLEM — L50.9 URTICARIA, UNSPECIFIED: Chronic | Status: ACTIVE | Noted: 2017-05-10

## 2018-07-26 PROBLEM — I10 ESSENTIAL (PRIMARY) HYPERTENSION: Chronic | Status: ACTIVE | Noted: 2017-05-09

## 2018-07-26 LAB
ALBUMIN SERPL ELPH-MCNC: 1.7 G/DL — LOW (ref 3.5–5)
ALP SERPL-CCNC: 56 U/L — SIGNIFICANT CHANGE UP (ref 40–120)
ALT FLD-CCNC: 12 U/L DA — SIGNIFICANT CHANGE UP (ref 10–60)
ANION GAP SERPL CALC-SCNC: 6 MMOL/L — SIGNIFICANT CHANGE UP (ref 5–17)
APPEARANCE UR: CLEAR — SIGNIFICANT CHANGE UP
APPEARANCE UR: CLEAR — SIGNIFICANT CHANGE UP
AST SERPL-CCNC: 10 U/L — SIGNIFICANT CHANGE UP (ref 10–40)
BACTERIA # UR AUTO: ABNORMAL /HPF
BASOPHILS # BLD AUTO: 0.1 K/UL — SIGNIFICANT CHANGE UP (ref 0–0.2)
BASOPHILS NFR BLD AUTO: 0.7 % — SIGNIFICANT CHANGE UP (ref 0–2)
BILIRUB SERPL-MCNC: 0.3 MG/DL — SIGNIFICANT CHANGE UP (ref 0.2–1.2)
BILIRUB UR-MCNC: NEGATIVE — SIGNIFICANT CHANGE UP
BILIRUB UR-MCNC: NEGATIVE — SIGNIFICANT CHANGE UP
BUN SERPL-MCNC: 14 MG/DL — SIGNIFICANT CHANGE UP (ref 7–18)
CALCIUM SERPL-MCNC: 7.9 MG/DL — LOW (ref 8.4–10.5)
CHLORIDE SERPL-SCNC: 113 MMOL/L — HIGH (ref 96–108)
CHOLEST SERPL-MCNC: 71 MG/DL — SIGNIFICANT CHANGE UP (ref 10–199)
CO2 SERPL-SCNC: 25 MMOL/L — SIGNIFICANT CHANGE UP (ref 22–31)
COLOR SPEC: YELLOW — SIGNIFICANT CHANGE UP
COLOR SPEC: YELLOW — SIGNIFICANT CHANGE UP
CREAT SERPL-MCNC: 0.43 MG/DL — LOW (ref 0.5–1.3)
DIFF PNL FLD: ABNORMAL
DIFF PNL FLD: ABNORMAL
EOSINOPHIL # BLD AUTO: 0.3 K/UL — SIGNIFICANT CHANGE UP (ref 0–0.5)
EOSINOPHIL NFR BLD AUTO: 2.5 % — SIGNIFICANT CHANGE UP (ref 0–6)
EPI CELLS # UR: ABNORMAL /HPF
GLUCOSE SERPL-MCNC: 134 MG/DL — HIGH (ref 70–99)
GLUCOSE UR QL: NEGATIVE — SIGNIFICANT CHANGE UP
GLUCOSE UR QL: NEGATIVE — SIGNIFICANT CHANGE UP
HBA1C BLD-MCNC: 6.3 % — HIGH (ref 4–5.6)
HCT VFR BLD CALC: 30.2 % — LOW (ref 34.5–45)
HDLC SERPL-MCNC: 23 MG/DL — LOW (ref 40–125)
HGB BLD-MCNC: 9.4 G/DL — LOW (ref 11.5–15.5)
KETONES UR-MCNC: NEGATIVE — SIGNIFICANT CHANGE UP
KETONES UR-MCNC: NEGATIVE — SIGNIFICANT CHANGE UP
LEUKOCYTE ESTERASE UR-ACNC: ABNORMAL
LEUKOCYTE ESTERASE UR-ACNC: ABNORMAL
LIPID PNL WITH DIRECT LDL SERPL: 31 MG/DL — SIGNIFICANT CHANGE UP
LYMPHOCYTES # BLD AUTO: 2.4 K/UL — SIGNIFICANT CHANGE UP (ref 1–3.3)
LYMPHOCYTES # BLD AUTO: 21.3 % — SIGNIFICANT CHANGE UP (ref 13–44)
MAGNESIUM SERPL-MCNC: 2.1 MG/DL — SIGNIFICANT CHANGE UP (ref 1.6–2.6)
MCHC RBC-ENTMCNC: 26.4 PG — LOW (ref 27–34)
MCHC RBC-ENTMCNC: 31 GM/DL — LOW (ref 32–36)
MCV RBC AUTO: 85.2 FL — SIGNIFICANT CHANGE UP (ref 80–100)
MONOCYTES # BLD AUTO: 0.7 K/UL — SIGNIFICANT CHANGE UP (ref 0–0.9)
MONOCYTES NFR BLD AUTO: 6.2 % — SIGNIFICANT CHANGE UP (ref 2–14)
NEUTROPHILS # BLD AUTO: 7.7 K/UL — HIGH (ref 1.8–7.4)
NEUTROPHILS NFR BLD AUTO: 69.3 % — SIGNIFICANT CHANGE UP (ref 43–77)
NITRITE UR-MCNC: NEGATIVE — SIGNIFICANT CHANGE UP
NITRITE UR-MCNC: NEGATIVE — SIGNIFICANT CHANGE UP
PH UR: 5 — SIGNIFICANT CHANGE UP (ref 5–8)
PH UR: 6.5 — SIGNIFICANT CHANGE UP (ref 5–8)
PHOSPHATE SERPL-MCNC: 2 MG/DL — LOW (ref 2.5–4.5)
PLATELET # BLD AUTO: 325 K/UL — SIGNIFICANT CHANGE UP (ref 150–400)
POTASSIUM SERPL-MCNC: 3.3 MMOL/L — LOW (ref 3.5–5.3)
POTASSIUM SERPL-SCNC: 3.3 MMOL/L — LOW (ref 3.5–5.3)
PROT SERPL-MCNC: 6.3 G/DL — SIGNIFICANT CHANGE UP (ref 6–8.3)
PROT UR-MCNC: NEGATIVE — SIGNIFICANT CHANGE UP
PROT UR-MCNC: NEGATIVE — SIGNIFICANT CHANGE UP
RBC # BLD: 3.55 M/UL — LOW (ref 3.8–5.2)
RBC # FLD: 13.6 % — SIGNIFICANT CHANGE UP (ref 10.3–14.5)
RBC CASTS # UR COMP ASSIST: ABNORMAL /HPF (ref 0–2)
SODIUM SERPL-SCNC: 144 MMOL/L — SIGNIFICANT CHANGE UP (ref 135–145)
SP GR SPEC: 1 — LOW (ref 1.01–1.02)
SP GR SPEC: 1.02 — SIGNIFICANT CHANGE UP (ref 1.01–1.02)
TOTAL CHOLESTEROL/HDL RATIO MEASUREMENT: 3.1 RATIO — LOW (ref 3.3–7.1)
TRIGL SERPL-MCNC: 87 MG/DL — SIGNIFICANT CHANGE UP (ref 10–149)
TSH SERPL-MCNC: 0.26 UU/ML — LOW (ref 0.34–4.82)
UROBILINOGEN FLD QL: NEGATIVE — SIGNIFICANT CHANGE UP
UROBILINOGEN FLD QL: NEGATIVE — SIGNIFICANT CHANGE UP
WBC # BLD: 11.2 K/UL — HIGH (ref 3.8–10.5)
WBC # FLD AUTO: 11.2 K/UL — HIGH (ref 3.8–10.5)
WBC UR QL: ABNORMAL /HPF (ref 0–5)

## 2018-07-26 PROCEDURE — 99223 1ST HOSP IP/OBS HIGH 75: CPT | Mod: AI,GC

## 2018-07-26 RX ORDER — MORPHINE SULFATE 50 MG/1
1 CAPSULE, EXTENDED RELEASE ORAL EVERY 6 HOURS
Qty: 0 | Refills: 0 | Status: DISCONTINUED | OUTPATIENT
Start: 2018-07-26 | End: 2018-08-01

## 2018-07-26 RX ORDER — MORPHINE SULFATE 50 MG/1
15 CAPSULE, EXTENDED RELEASE ORAL EVERY 6 HOURS
Qty: 0 | Refills: 0 | Status: DISCONTINUED | OUTPATIENT
Start: 2018-07-26 | End: 2018-07-26

## 2018-07-26 RX ORDER — POTASSIUM CHLORIDE 20 MEQ
40 PACKET (EA) ORAL ONCE
Qty: 0 | Refills: 0 | Status: COMPLETED | OUTPATIENT
Start: 2018-07-26 | End: 2018-07-26

## 2018-07-26 RX ORDER — SODIUM,POTASSIUM PHOSPHATES 278-250MG
1 POWDER IN PACKET (EA) ORAL ONCE
Qty: 0 | Refills: 0 | Status: DISCONTINUED | OUTPATIENT
Start: 2018-07-26 | End: 2018-07-26

## 2018-07-26 RX ORDER — SODIUM,POTASSIUM PHOSPHATES 278-250MG
1 POWDER IN PACKET (EA) ORAL ONCE
Qty: 0 | Refills: 0 | Status: COMPLETED | OUTPATIENT
Start: 2018-07-26 | End: 2018-07-26

## 2018-07-26 RX ORDER — MORPHINE SULFATE 50 MG/1
10 CAPSULE, EXTENDED RELEASE ORAL EVERY 6 HOURS
Qty: 0 | Refills: 0 | Status: DISCONTINUED | OUTPATIENT
Start: 2018-07-26 | End: 2018-07-28

## 2018-07-26 RX ADMIN — MORPHINE SULFATE 1 MILLIGRAM(S): 50 CAPSULE, EXTENDED RELEASE ORAL at 21:00

## 2018-07-26 RX ADMIN — Medication 100 MILLIGRAM(S): at 22:57

## 2018-07-26 RX ADMIN — Medication 40 MILLIEQUIVALENT(S): at 12:22

## 2018-07-26 RX ADMIN — ENOXAPARIN SODIUM 40 MILLIGRAM(S): 100 INJECTION SUBCUTANEOUS at 12:22

## 2018-07-26 RX ADMIN — MORPHINE SULFATE 2 MILLIGRAM(S): 50 CAPSULE, EXTENDED RELEASE ORAL at 03:20

## 2018-07-26 RX ADMIN — TEMAZEPAM 30 MILLIGRAM(S): 15 CAPSULE ORAL at 22:57

## 2018-07-26 RX ADMIN — Medication 100 MILLIGRAM(S): at 13:52

## 2018-07-26 RX ADMIN — Medication 1 TABLET(S): at 12:22

## 2018-07-26 RX ADMIN — MORPHINE SULFATE 2 MILLIGRAM(S): 50 CAPSULE, EXTENDED RELEASE ORAL at 02:49

## 2018-07-26 RX ADMIN — MORPHINE SULFATE 2 MILLIGRAM(S): 50 CAPSULE, EXTENDED RELEASE ORAL at 10:10

## 2018-07-26 RX ADMIN — Medication 100 MILLIGRAM(S): at 05:29

## 2018-07-26 RX ADMIN — RISPERIDONE 1 MILLIGRAM(S): 4 TABLET ORAL at 12:22

## 2018-07-26 RX ADMIN — CEFTRIAXONE 100 GRAM(S): 500 INJECTION, POWDER, FOR SOLUTION INTRAMUSCULAR; INTRAVENOUS at 05:29

## 2018-07-26 RX ADMIN — MORPHINE SULFATE 2 MILLIGRAM(S): 50 CAPSULE, EXTENDED RELEASE ORAL at 10:30

## 2018-07-26 RX ADMIN — SODIUM CHLORIDE 100 MILLILITER(S): 9 INJECTION, SOLUTION INTRAVENOUS at 05:31

## 2018-07-26 RX ADMIN — MORPHINE SULFATE 2 MILLIGRAM(S): 50 CAPSULE, EXTENDED RELEASE ORAL at 17:45

## 2018-07-26 RX ADMIN — MORPHINE SULFATE 2 MILLIGRAM(S): 50 CAPSULE, EXTENDED RELEASE ORAL at 18:00

## 2018-07-26 NOTE — PROGRESS NOTE ADULT - SUBJECTIVE AND OBJECTIVE BOX
PGY 2 Note discussed with supervising primary attending    Patient is a 82y old  Female who presents with a chief complaint of hypotension and fever (2018 21:56)      INTERVAL HPI/OVERNIGHT EVENTS: patient seen and examined at bed side, pt was screaming this morning, since she is non verbal could not appreciate the cause. spoke with her  offers no new complaints; current symptoms resolving    MEDICATIONS  (STANDING):  cefTRIAXone   IVPB 1 Gram(s) IV Intermittent every 24 hours  dextrose 5% + sodium chloride 0.9%. 1000 milliLiter(s) (100 mL/Hr) IV Continuous <Continuous>  enoxaparin Injectable 40 milliGRAM(s) SubCutaneous daily  metroNIDAZOLE  IVPB 500 milliGRAM(s) IV Intermittent every 8 hours  risperiDONE   Tablet 1 milliGRAM(s) Oral daily    MEDICATIONS  (PRN):  acetaminophen    Suspension 650 milliGRAM(s) Oral every 6 hours PRN For Temp greater than 38 C (100.4 F)  morphine  - Injectable 2 milliGRAM(s) IV Push every 6 hours PRN Severe Pain (7 - 10)  temazepam 30 milliGRAM(s) Oral at bedtime PRN Insomnia      __________________________________________________  REVIEW OF SYSTEMS:    CONSTITUTIONAL: No fever,   EYES: no acute visual disturbances  NECK: No pain or stiffness  RESPIRATORY: No cough; No shortness of breath  CARDIOVASCULAR: No chest pain, no palpitations  GASTROINTESTINAL: No pain. No nausea or vomiting; No diarrhea   NEUROLOGICAL: No headache or numbness, no tremors  MUSCULOSKELETAL: No joint pain, no muscle pain  GENITOURINARY: no dysuria, no frequency, no hesitancy  PSYCHIATRY: no depression , no anxiety  ALL OTHER  ROS negative        Vital Signs Last 24 Hrs  T(C): 36.6 (2018 05:12), Max: 37.8 (2018 19:37)  T(F): 97.9 (2018 05:12), Max: 100 (2018 19:37)  HR: 88 (2018 05:12) (83 - 95)  BP: 100/54 (2018 05:12) (71/43 - 101/66)  BP(mean): --  RR: 16 (2018 05:12) (16 - 20)  SpO2: 100% (2018 05:12) (95% - 100%)    ________________________________________________  PHYSICAL EXAM:  GENERAL: NAD  HEENT: Normocephalic;  conjunctivae and sclerae clear; moist mucous membranes;   NECK : supple  CHEST/LUNG: Clear to auscultation bilaterally with good air entry   HEART: S1 S2  regular; no murmurs, gallops or rubs  ABDOMEN: Soft, Nontender, Nondistended; Bowel sounds present  EXTREMITIES: no cyanosis; no edema; no calf tenderness  SKIN: warm and dry; no rash  NERVOUS SYSTEM:  Awake and alert; Oriented  to place, person and time ; no new deficits    _________________________________________________  LABS:                        9.4    11.2  )-----------( 325      ( 2018 08:48 )             30.2         144  |  113<H>  |  14  ----------------------------<  134<H>  3.3<L>   |  25  |  0.43<L>    Ca    7.9<L>      2018 08:48  Phos  2.0       Mg     2.1         TPro  6.3  /  Alb  1.7<L>  /  TBili  0.3  /  DBili  x   /  AST  10  /  ALT  12  /  AlkPhos  56        Urinalysis Basic - ( 2018 01:51 )    Color: Yellow / Appearance: Clear / S.005 / pH: x  Gluc: x / Ketone: Negative  / Bili: Negative / Urobili: Negative   Blood: x / Protein: Negative / Nitrite: Negative   Leuk Esterase: Trace / RBC: 0-2 /HPF / WBC 0-2 /HPF   Sq Epi: x / Non Sq Epi: Occasional /HPF / Bacteria: x      CAPILLARY BLOOD GLUCOSE      POCT Blood Glucose.: 114 mg/dL (2018 19:24)        RADIOLOGY & ADDITIONAL TESTS:    Imaging Personally Reviewed:  YES/NO    Consultant(s) Notes Reviewed:   YES/ No    Care Discussed with Consultants :     Plan of care was discussed with patient and /or primary care giver; all questions and concerns were addressed and care was aligned with patient's wishes. PGY 2 Note discussed with supervising primary attending    Patient is a 82y old  Female who presents with a chief complaint of hypotension and fever (2018 21:56)      INTERVAL HPI/OVERNIGHT EVENTS: patient seen and examined at bed side, pt was screaming this morning, since she is non verbal could not appreciate the cause. spoke with her daughter in law, family wants surgical intervention for her fistula.    offers no new complaints; current symptoms resolving    MEDICATIONS  (STANDING):  cefTRIAXone   IVPB 1 Gram(s) IV Intermittent every 24 hours  dextrose 5% + sodium chloride 0.9%. 1000 milliLiter(s) (100 mL/Hr) IV Continuous <Continuous>  enoxaparin Injectable 40 milliGRAM(s) SubCutaneous daily  metroNIDAZOLE  IVPB 500 milliGRAM(s) IV Intermittent every 8 hours  risperiDONE   Tablet 1 milliGRAM(s) Oral daily    MEDICATIONS  (PRN):  acetaminophen    Suspension 650 milliGRAM(s) Oral every 6 hours PRN For Temp greater than 38 C (100.4 F)  morphine  - Injectable 2 milliGRAM(s) IV Push every 6 hours PRN Severe Pain (7 - 10)  temazepam 30 milliGRAM(s) Oral at bedtime PRN Insomnia      ROS no able to asses due to patient medical condition       Vital Signs Last 24 Hrs  T(C): 36.6 (2018 05:12), Max: 37.8 (2018 19:37)  T(F): 97.9 (2018 05:12), Max: 100 (2018 19:37)  HR: 88 (2018 05:12) (83 - 95)  BP: 100/54 (2018 05:12) (71/43 - 101/66)  BP(mean): --  RR: 16 (2018 05:12) (16 - 20)  SpO2: 100% (2018 05:12) (95% - 100%)    ________________________________________________  PHYSICAL EXAM:  GENERAL: non verbal   HEENT: Normocephalic;  conjunctivae and sclerae clear; moist mucous membranes;   NECK : supple  CHEST/LUNG: Clear to auscultation bilaterally with good air entry   HEART: S1 S2  regular; no murmurs, gallops or rubs  ABDOMEN: Soft, Nontender, Nondistended; Bowel sounds present  EXTREMITIES: no cyanosis; no edema; no calf tenderness  SKIN: warm and dry; no rash  NERVOUS SYSTEM:  awake and alert     _________________________________________________  LABS:                        9.4    11.2  )-----------( 325      ( 2018 08:48 )             30.2         144  |  113<H>  |  14  ----------------------------<  134<H>  3.3<L>   |  25  |  0.43<L>    Ca    7.9<L>      2018 08:48  Phos  2.0       Mg     2.1         TPro  6.3  /  Alb  1.7<L>  /  TBili  0.3  /  DBili  x   /  AST  10  /  ALT  12  /  AlkPhos  56        Urinalysis Basic - ( 2018 01:51 )    Color: Yellow / Appearance: Clear / S.005 / pH: x  Gluc: x / Ketone: Negative  / Bili: Negative / Urobili: Negative   Blood: x / Protein: Negative / Nitrite: Negative   Leuk Esterase: Trace / RBC: 0-2 /HPF / WBC 0-2 /HPF   Sq Epi: x / Non Sq Epi: Occasional /HPF / Bacteria: x      CAPILLARY BLOOD GLUCOSE      POCT Blood Glucose.: 114 mg/dL (2018 19:24)        RADIOLOGY & ADDITIONAL TESTS:  < from: CT Abdomen and Pelvis No Cont (18 @ 22:16) >  PROCEDURE DATE:  2018          INTERPRETATION:  CT of the abdomen and pelvis without IV or oral contrast    Clinical Indication: R/o perforation. Acute Vulvitis.    Technique: Axial multidetector CT images of the abdomen and pelvis are   acquired without IV or oral contrast.    Comparison: 6/3/2018.    Findings:    Abdomen: Limited sections through the lung bases demonstrate trace   bilateral pleural effusions, decreased since the previous examination.   Small collapsed hiatal hernia versus distal esophageal mural thickening.   Small bilateral atelectasis.    Evaluation of the abdomen and pelvis is limited by lack of IV and oral   contrast. Stable cholecystectomy clips. Small calcification in the left   hepatic lobe, likely due to prior granulomatous disease. The common bile   duct is not dilated. Allowing for the noncontrast technique, the   pancreas, spleen, and adrenals appear grossly unremarkable. There is a   stable cystin the right kidney. Small nonobstructive calculus in the   left kidney. No hydronephrosis.    Moderate to large amount of stool in the colon. The appendix appears   normal. No bowel obstruction. The stomach is collapsed, rendering   evaluation difficult.    No evidence for free air, ascites, or enlarged lymph node.    Pelvis: The urinary bladder appears grossly unremarkable. Large amount of   stool in the rectum is again noted with mild rectal wall thickening,   adjacent perirectal stranding esperanza 1.2 cm mildly enlarged left   perirectal lymph node. Finding again may represent associated stercoral   colitis; underlying rectal cancer cannot be excluded. Stable air in the   vagina, cervix and endometrium. No evidence for pelvic free fluid.    Impression: No evidence for free air within the abdomen.    Moderate to large amount of stool in the colon. Large amount of stool in   the rectum with mild rectal wall thickening, perirectal stranding and a   mildly enlarged 1.2 cm left perirectal lymph node may represent   associated stercoral colitis. Underlying rectal cancer cannot be excluded.    Air within the vagina, cervix and endometrium again noted, nonspecific.    Trace bilateral pleural effusions, improved.    Small collapsed hiatal hernia versus distal esophageal mural thickening.   EGD may be pursued for further evaluation.    Other findings as above.    A preliminary report was provided by [employer].                < end of copied text >      Imaging Personally Reviewed:  YES    Care Discussed with Consultants :     Plan of care was discussed with patient and /or primary care giver; all questions and concerns were addressed and care was aligned with patient's wishes.

## 2018-07-26 NOTE — PROGRESS NOTE ADULT - PROBLEM SELECTOR PLAN 1
P/w F x 5 days w/ increased rectovaginal output and tender abdominal exam  - C/w Rocephin and Flagyl  - NPO and PRN Morphine 1 mg Q4 (home dose 15 mg PO)  - Unable to send C. diff 2/2 non watery stool  CT abd showed the rectum with mild rectal wall thickening, perirectal stranding and a   mildly enlarged 1.2 cm left perirectal lymph node may represent   associated stercoral colitis. P/w F x 5 days w/ increased rectovaginal output and tender abdominal exam  - C/w Rocephin and Flagyl  - NPO and PRN Morphine 1 mg Q4 (home dose 15 mg PO)  - Unable to send C. diff 2/2 non watery stool  CT abd showed the rectum with mild rectal wall thickening, perirectal stranding and a mildly enlarged 1.2 cm left perirectal lymph node may represent   associated stercoral colitis. Air within the vagina, cervix and endometrium again noted, nonspecific.  Obg-gyn consulted P/w F x 5 days w/ increased rectovaginal output and tender abdominal exam  - C/w Rocephin and Flagyl  - NPO and PRN Morphine 1 mg Q4 (home dose 15 mg PO)  - Unable to send C. diff 2/2 non watery stool  CT abd showed the rectum with mild rectal wall thickening, perirectal stranding and a mildly enlarged 1.2 cm left perirectal lymph node may represent   associated stercoral colitis. Air within the vagina, cervix and endometrium again noted, nonspecific.  Obg-gyn consulted  pt is afebrile, leucocytosis improving, BP is liable

## 2018-07-27 LAB
ALBUMIN SERPL ELPH-MCNC: 1.7 G/DL — LOW (ref 3.5–5)
ALP SERPL-CCNC: 52 U/L — SIGNIFICANT CHANGE UP (ref 40–120)
ALT FLD-CCNC: 10 U/L DA — SIGNIFICANT CHANGE UP (ref 10–60)
ANION GAP SERPL CALC-SCNC: 4 MMOL/L — LOW (ref 5–17)
AST SERPL-CCNC: 8 U/L — LOW (ref 10–40)
BASOPHILS # BLD AUTO: 0.1 K/UL — SIGNIFICANT CHANGE UP (ref 0–0.2)
BASOPHILS NFR BLD AUTO: 0.8 % — SIGNIFICANT CHANGE UP (ref 0–2)
BILIRUB SERPL-MCNC: 0.3 MG/DL — SIGNIFICANT CHANGE UP (ref 0.2–1.2)
BUN SERPL-MCNC: 10 MG/DL — SIGNIFICANT CHANGE UP (ref 7–18)
CALCIUM SERPL-MCNC: 7.7 MG/DL — LOW (ref 8.4–10.5)
CHLORIDE SERPL-SCNC: 110 MMOL/L — HIGH (ref 96–108)
CO2 SERPL-SCNC: 28 MMOL/L — SIGNIFICANT CHANGE UP (ref 22–31)
CREAT SERPL-MCNC: 0.37 MG/DL — LOW (ref 0.5–1.3)
CULTURE RESULTS: NO GROWTH — SIGNIFICANT CHANGE UP
CULTURE RESULTS: NO GROWTH — SIGNIFICANT CHANGE UP
EOSINOPHIL # BLD AUTO: 0.4 K/UL — SIGNIFICANT CHANGE UP (ref 0–0.5)
EOSINOPHIL NFR BLD AUTO: 4 % — SIGNIFICANT CHANGE UP (ref 0–6)
GLUCOSE SERPL-MCNC: 120 MG/DL — HIGH (ref 70–99)
HCT VFR BLD CALC: 29.7 % — LOW (ref 34.5–45)
HGB BLD-MCNC: 9.2 G/DL — LOW (ref 11.5–15.5)
LYMPHOCYTES # BLD AUTO: 2.6 K/UL — SIGNIFICANT CHANGE UP (ref 1–3.3)
LYMPHOCYTES # BLD AUTO: 26.3 % — SIGNIFICANT CHANGE UP (ref 13–44)
MAGNESIUM SERPL-MCNC: 1.9 MG/DL — SIGNIFICANT CHANGE UP (ref 1.6–2.6)
MCHC RBC-ENTMCNC: 26.1 PG — LOW (ref 27–34)
MCHC RBC-ENTMCNC: 30.8 GM/DL — LOW (ref 32–36)
MCV RBC AUTO: 84.8 FL — SIGNIFICANT CHANGE UP (ref 80–100)
MONOCYTES # BLD AUTO: 0.7 K/UL — SIGNIFICANT CHANGE UP (ref 0–0.9)
MONOCYTES NFR BLD AUTO: 6.9 % — SIGNIFICANT CHANGE UP (ref 2–14)
NEUTROPHILS # BLD AUTO: 6.1 K/UL — SIGNIFICANT CHANGE UP (ref 1.8–7.4)
NEUTROPHILS NFR BLD AUTO: 62 % — SIGNIFICANT CHANGE UP (ref 43–77)
PHOSPHATE SERPL-MCNC: 1.6 MG/DL — LOW (ref 2.5–4.5)
PLATELET # BLD AUTO: 346 K/UL — SIGNIFICANT CHANGE UP (ref 150–400)
POTASSIUM SERPL-MCNC: 3 MMOL/L — LOW (ref 3.5–5.3)
POTASSIUM SERPL-SCNC: 3 MMOL/L — LOW (ref 3.5–5.3)
PROT SERPL-MCNC: 6 G/DL — SIGNIFICANT CHANGE UP (ref 6–8.3)
RBC # BLD: 3.51 M/UL — LOW (ref 3.8–5.2)
RBC # FLD: 13.3 % — SIGNIFICANT CHANGE UP (ref 10.3–14.5)
SODIUM SERPL-SCNC: 142 MMOL/L — SIGNIFICANT CHANGE UP (ref 135–145)
SPECIMEN SOURCE: SIGNIFICANT CHANGE UP
SPECIMEN SOURCE: SIGNIFICANT CHANGE UP
VIT B12 SERPL-MCNC: 1782 PG/ML — HIGH (ref 232–1245)
WBC # BLD: 9.9 K/UL — SIGNIFICANT CHANGE UP (ref 3.8–10.5)
WBC # FLD AUTO: 9.9 K/UL — SIGNIFICANT CHANGE UP (ref 3.8–10.5)

## 2018-07-27 RX ORDER — SODIUM,POTASSIUM PHOSPHATES 278-250MG
1 POWDER IN PACKET (EA) ORAL ONCE
Qty: 0 | Refills: 0 | Status: COMPLETED | OUTPATIENT
Start: 2018-07-27 | End: 2018-07-27

## 2018-07-27 RX ORDER — SODIUM,POTASSIUM PHOSPHATES 278-250MG
1 POWDER IN PACKET (EA) ORAL ONCE
Qty: 0 | Refills: 0 | Status: DISCONTINUED | OUTPATIENT
Start: 2018-07-27 | End: 2018-07-27

## 2018-07-27 RX ORDER — POTASSIUM PHOSPHATE, MONOBASIC POTASSIUM PHOSPHATE, DIBASIC 236; 224 MG/ML; MG/ML
30 INJECTION, SOLUTION INTRAVENOUS ONCE
Qty: 0 | Refills: 0 | Status: COMPLETED | OUTPATIENT
Start: 2018-07-27 | End: 2018-07-27

## 2018-07-27 RX ORDER — AMPICILLIN SODIUM AND SULBACTAM SODIUM 250; 125 MG/ML; MG/ML
1.5 INJECTION, POWDER, FOR SUSPENSION INTRAMUSCULAR; INTRAVENOUS ONCE
Qty: 0 | Refills: 0 | Status: COMPLETED | OUTPATIENT
Start: 2018-07-27 | End: 2018-07-27

## 2018-07-27 RX ORDER — AMPICILLIN SODIUM AND SULBACTAM SODIUM 250; 125 MG/ML; MG/ML
1.5 INJECTION, POWDER, FOR SUSPENSION INTRAMUSCULAR; INTRAVENOUS EVERY 6 HOURS
Qty: 0 | Refills: 0 | Status: DISCONTINUED | OUTPATIENT
Start: 2018-07-28 | End: 2018-08-02

## 2018-07-27 RX ORDER — POTASSIUM CHLORIDE 20 MEQ
40 PACKET (EA) ORAL EVERY 4 HOURS
Qty: 0 | Refills: 0 | Status: COMPLETED | OUTPATIENT
Start: 2018-07-27 | End: 2018-07-27

## 2018-07-27 RX ORDER — AMPICILLIN SODIUM AND SULBACTAM SODIUM 250; 125 MG/ML; MG/ML
INJECTION, POWDER, FOR SUSPENSION INTRAMUSCULAR; INTRAVENOUS
Qty: 0 | Refills: 0 | Status: DISCONTINUED | OUTPATIENT
Start: 2018-07-27 | End: 2018-08-02

## 2018-07-27 RX ADMIN — MORPHINE SULFATE 10 MILLIGRAM(S): 50 CAPSULE, EXTENDED RELEASE ORAL at 22:30

## 2018-07-27 RX ADMIN — Medication 100 MILLIGRAM(S): at 13:55

## 2018-07-27 RX ADMIN — MORPHINE SULFATE 1 MILLIGRAM(S): 50 CAPSULE, EXTENDED RELEASE ORAL at 11:50

## 2018-07-27 RX ADMIN — POTASSIUM PHOSPHATE, MONOBASIC POTASSIUM PHOSPHATE, DIBASIC 85 MILLIMOLE(S): 236; 224 INJECTION, SOLUTION INTRAVENOUS at 10:01

## 2018-07-27 RX ADMIN — AMPICILLIN SODIUM AND SULBACTAM SODIUM 100 GRAM(S): 250; 125 INJECTION, POWDER, FOR SUSPENSION INTRAMUSCULAR; INTRAVENOUS at 23:10

## 2018-07-27 RX ADMIN — ENOXAPARIN SODIUM 40 MILLIGRAM(S): 100 INJECTION SUBCUTANEOUS at 11:26

## 2018-07-27 RX ADMIN — Medication 1 MILLIGRAM(S): at 13:54

## 2018-07-27 RX ADMIN — TEMAZEPAM 30 MILLIGRAM(S): 15 CAPSULE ORAL at 22:01

## 2018-07-27 RX ADMIN — SODIUM CHLORIDE 100 MILLILITER(S): 9 INJECTION, SOLUTION INTRAVENOUS at 21:12

## 2018-07-27 RX ADMIN — MORPHINE SULFATE 1 MILLIGRAM(S): 50 CAPSULE, EXTENDED RELEASE ORAL at 12:22

## 2018-07-27 RX ADMIN — SODIUM CHLORIDE 100 MILLILITER(S): 9 INJECTION, SOLUTION INTRAVENOUS at 05:39

## 2018-07-27 RX ADMIN — MORPHINE SULFATE 1 MILLIGRAM(S): 50 CAPSULE, EXTENDED RELEASE ORAL at 19:00

## 2018-07-27 RX ADMIN — RISPERIDONE 1 MILLIGRAM(S): 4 TABLET ORAL at 10:01

## 2018-07-27 RX ADMIN — CEFTRIAXONE 100 GRAM(S): 500 INJECTION, POWDER, FOR SOLUTION INTRAMUSCULAR; INTRAVENOUS at 05:39

## 2018-07-27 RX ADMIN — MORPHINE SULFATE 10 MILLIGRAM(S): 50 CAPSULE, EXTENDED RELEASE ORAL at 21:12

## 2018-07-27 RX ADMIN — Medication 1 TABLET(S): at 13:54

## 2018-07-27 RX ADMIN — MORPHINE SULFATE 1 MILLIGRAM(S): 50 CAPSULE, EXTENDED RELEASE ORAL at 18:15

## 2018-07-27 RX ADMIN — Medication 40 MILLIEQUIVALENT(S): at 13:55

## 2018-07-27 RX ADMIN — Medication 100 MILLIGRAM(S): at 05:39

## 2018-07-27 RX ADMIN — Medication 40 MILLIEQUIVALENT(S): at 10:00

## 2018-07-27 RX ADMIN — AMPICILLIN SODIUM AND SULBACTAM SODIUM 100 GRAM(S): 250; 125 INJECTION, POWDER, FOR SUSPENSION INTRAMUSCULAR; INTRAVENOUS at 17:10

## 2018-07-27 RX ADMIN — MORPHINE SULFATE 1 MILLIGRAM(S): 50 CAPSULE, EXTENDED RELEASE ORAL at 03:30

## 2018-07-27 RX ADMIN — MORPHINE SULFATE 1 MILLIGRAM(S): 50 CAPSULE, EXTENDED RELEASE ORAL at 02:53

## 2018-07-27 NOTE — CONSULT NOTE ADULT - SUBJECTIVE AND OBJECTIVE BOX
Attending: Dr Lake     HPI:  82 F from home, lives with son, has HHA, nonverbal, bedbound on home hospice, DNR/DNI, with PMH Dementia brought in by daughter in law Carine Younger [phone] for hypotension and fever x 5 days associated w/ vaginal discharge white mucus characteristic. Pt is having 5 movements via the known rectovaginal fistula, small, and non watery - family stopped giving Lactulose and Ativan past few days. Spoke w/ daughter in law confirming DNR/DNI and no central line or pressors - family is currently considering a surgical intervention for the rectovaginal fistula once the patient is more stable. Otherwise family denies any recent travel, sick contacts, vomiting, SOB, or any other complaints. Pt recently discharged in 2018 for sepsis 2/2 stercoral colitis - completed 7 days of Rocephin and Flagyl - Gynecology consulted regarding methylene blue test to be given as enema to r/o rectovaginal fistula but Pt had rectal edema and inflammation so risk of perforation was deemed too high and family decided not to pursue surgery. (2018 21:56)      PAST MEDICAL & SURGICAL HISTORY:  Urticaria  Dementia with behavioral disturbance, unspecified dementia type  Essential hypertension  No significant past surgical history      MEDICATIONS  (STANDING):  cefTRIAXone   IVPB 1 Gram(s) IV Intermittent every 24 hours  dextrose 5% + sodium chloride 0.9%. 1000 milliLiter(s) (100 mL/Hr) IV Continuous <Continuous>  enoxaparin Injectable 40 milliGRAM(s) SubCutaneous daily  LORazepam   Injectable 1 milliGRAM(s) IV Push once  metroNIDAZOLE  IVPB 500 milliGRAM(s) IV Intermittent every 8 hours  potassium acid phosphate/sodium acid phosphate tablet (K-PHOS No. 2) 1 Tablet(s) Oral once  potassium chloride   Powder 40 milliEquivalent(s) Oral every 4 hours  risperiDONE   Tablet 1 milliGRAM(s) Oral daily    MEDICATIONS  (PRN):  acetaminophen    Suspension 650 milliGRAM(s) Oral every 6 hours PRN For Temp greater than 38 C (100.4 F)  morphine   Solution 10 milliGRAM(s) Oral every 6 hours PRN Moderate Pain (4 - 6)  morphine  - Injectable 1 milliGRAM(s) IV Push every 6 hours PRN Severe Pain (7 - 10)  temazepam 30 milliGRAM(s) Oral at bedtime PRN Insomnia      Allergies    No Known Allergies    Intolerances        SOCIAL HISTORY:  FAMILY HISTORY:  No pertinent family history in first degree relatives      Vital Signs Last 24 Hrs  T(C): 36.7 (2018 05:05), Max: 37 (2018 14:52)  T(F): 98 (2018 05:05), Max: 98.6 (2018 14:52)  HR: 83 (2018 05:05) (76 - 83)  BP: 108/54 (2018 05:05) (104/57 - 109/53)  BP(mean): --  RR: 16 (2018 05:05) (16 - 16)  SpO2: 100% (2018 05:05) (96% - 100%)    I&O's Summary      LABS:                        9.2    9.9   )-----------( 346      ( 2018 06:18 )             29.7     07-27    142  |  110<H>  |  10  ----------------------------<  120<H>  3.0<L>   |  28  |  0.37<L>    Ca    7.7<L>      2018 06:18  Phos  1.6       Mg     1.9         TPro  6.0  /  Alb  1.7<L>  /  TBili  0.3  /  DBili  x   /  AST  8<L>  /  ALT  10  /  AlkPhos  52  07-27      Urinalysis Basic - ( 2018 16:55 )    Color: Yellow / Appearance: Clear / S.020 / pH: x  Gluc: x / Ketone: Negative  / Bili: Negative / Urobili: Negative   Blood: x / Protein: Negative / Nitrite: Negative   Leuk Esterase: Moderate / RBC: 5-10 /HPF / WBC 6-10 /HPF   Sq Epi: x / Non Sq Epi: Moderate /HPF / Bacteria: Moderate /HPF      CAPILLARY BLOOD GLUCOSE      POCT Blood Glucose.: 120 mg/dL (2018 07:38)    LIVER FUNCTIONS - ( 2018 06:18 )  Alb: 1.7 g/dL / Pro: 6.0 g/dL / ALK PHOS: 52 U/L / ALT: 10 U/L DA / AST: 8 U/L / GGT: x             Cultures:  Culture Results:   No growth to date. ( @ 23:16)  Culture Results:   No growth to date. ( @ 23:16)      RADIOLOGY & ADDITIONAL STUDIES:  < from: CT Abdomen and Pelvis No Cont (18 @ 22:16) >  Findings:    Abdomen: Limited sections through the lung bases demonstrate trace   bilateral pleural effusions, decreased since the previous examination.   Small collapsed hiatal hernia versus distal esophageal mural thickening.   Small bilateral atelectasis.    Evaluation of the abdomen and pelvis is limited by lack of IV and oral   contrast. Stable cholecystectomy clips. Small calcification in the left   hepatic lobe, likely due to prior granulomatous disease. The common bile   duct is not dilated. Allowing for the noncontrast technique, the   pancreas, spleen, and adrenals appear grossly unremarkable. There is a   stable cystin the right kidney. Small nonobstructive calculus in the   left kidney. No hydronephrosis.    Moderate to large amount of stool in the colon. The appendix appears   normal. No bowel obstruction. The stomach is collapsed, rendering   evaluation difficult.    No evidence for free air, ascites, or enlarged lymph node.    Pelvis: The urinary bladder appears grossly unremarkable. Large amount of   stool in the rectum is again noted with mild rectal wall thickening,   adjacent perirectal stranding esperanza 1.2 cm mildly enlarged left   perirectal lymph node. Finding again may represent associated stercoral   colitis; underlying rectal cancer cannot be excluded. Stable air in the   vagina, cervix and endometrium. No evidence for pelvic free fluid.    Impression: No evidence for free air within the abdomen.    Moderate to large amount of stool in the colon. Large amount of stool in   the rectum with mild rectal wall thickening, perirectal stranding and a   mildly enlarged 1.2 cm left perirectal lymph node may represent   associated stercoral colitis. Underlying rectal cancer cannot be excluded.    Air within the vagina, cervix and endometrium again noted, nonspecific.    Trace bilateral pleural effusions, improved.    Small collapsed hiatal hernia versus distal esophageal mural thickening.   EGD may be pursued for further evaluation.    Other findings as above.    < end of copied text >    < from: CT Abdomen and Pelvis w/ Oral Cont and w/ IV Cont (18 @ 16:57) >  IMPRESSION:    Oral contrast has only reached the right colon. The colon is filled with   a large amount of stool. The rectum is markedly distended with a   thickened wall and surrounding inflammatory changes and lymph nodes   suggesting stercoral colitis. The uterus demonstrates air within the   endometrium which is concerning for fistula, however this cannot be   evaluated at this time in that oral contrast has not reached this area.   This would be better evaluated following evacuation of stool and with   rectal administration of contrast or vaginogram.     Small bilateral effusions new since the previous exam.    Thickening of the distal esophagus and cardia of the stomach. Correlate   clinically for pathological processes.      < end of copied text > Attending: Dr Lake     HPI:  82 F from home, lives with son, has HHA, nonverbal, bedbound on home hospice, DNR/DNI, with PMH Dementia brought in by daughter in law Carine Younger [phone] for hypotension and fever x 5 days associated w/ vaginal discharge white mucus characteristic. Pt is having 5 movements via the known rectovaginal fistula, small, and non watery - family stopped giving Lactulose and Ativan past few days. As per daughter in law, currently considering a surgical intervention for the rectovaginal fistula once the patient is more stable. Otherwise family denies any recent travel, sick contacts, vomiting, SOB, or any other complaints. Pt recently discharged in 2018 for sepsis 2/2 stercoral colitis - completed 7 days of Rocephin and Flagyl -(2018 21:56)      PAST MEDICAL & SURGICAL HISTORY:  Urticaria  Dementia with behavioral disturbance, unspecified dementia type  Essential hypertension  No significant past surgical history      MEDICATIONS  (STANDING):  cefTRIAXone   IVPB 1 Gram(s) IV Intermittent every 24 hours  dextrose 5% + sodium chloride 0.9%. 1000 milliLiter(s) (100 mL/Hr) IV Continuous <Continuous>  enoxaparin Injectable 40 milliGRAM(s) SubCutaneous daily  LORazepam   Injectable 1 milliGRAM(s) IV Push once  metroNIDAZOLE  IVPB 500 milliGRAM(s) IV Intermittent every 8 hours  potassium acid phosphate/sodium acid phosphate tablet (K-PHOS No. 2) 1 Tablet(s) Oral once  potassium chloride   Powder 40 milliEquivalent(s) Oral every 4 hours  risperiDONE   Tablet 1 milliGRAM(s) Oral daily    MEDICATIONS  (PRN):  acetaminophen    Suspension 650 milliGRAM(s) Oral every 6 hours PRN For Temp greater than 38 C (100.4 F)  morphine   Solution 10 milliGRAM(s) Oral every 6 hours PRN Moderate Pain (4 - 6)  morphine  - Injectable 1 milliGRAM(s) IV Push every 6 hours PRN Severe Pain (7 - 10)  temazepam 30 milliGRAM(s) Oral at bedtime PRN Insomnia      Allergies  No Known Allergies  Intolerances        SOCIAL HISTORY/  FAMILY HISTORY:  No pertinent family history in first degree relatives      Vital Signs Last 24 Hrs  T(C): 36.7 (2018 05:05), Max: 37 (2018 14:52)  T(F): 98 (2018 05:05), Max: 98.6 (2018 14:52)  HR: 83 (2018 05:05) (76 - 83)  BP: 108/54 (2018 05:05) (104/57 - 109/53)  BP(mean): --  RR: 16 (2018 05:05) (16 - 16)  SpO2: 100% (2018 05:05) (96% - 100%)    I&O's Summary      LABS:                        9.2    9.9   )-----------( 346      ( 2018 06:18 )             29.7         142  |  110<H>  |  10  ----------------------------<  120<H>  3.0<L>   |  28  |  0.37<L>    Ca    7.7<L>      2018 06:18  Phos  1.6       Mg     1.9         TPro  6.0  /  Alb  1.7<L>  /  TBili  0.3  /  DBili  x   /  AST  8<L>  /  ALT  10  /  AlkPhos  52        Urinalysis Basic - ( 2018 16:55 )    Color: Yellow / Appearance: Clear / S.020 / pH: x  Gluc: x / Ketone: Negative  / Bili: Negative / Urobili: Negative   Blood: x / Protein: Negative / Nitrite: Negative   Leuk Esterase: Moderate / RBC: 5-10 /HPF / WBC 6-10 /HPF   Sq Epi: x / Non Sq Epi: Moderate /HPF / Bacteria: Moderate /HPF      CAPILLARY BLOOD GLUCOSE      POCT Blood Glucose.: 120 mg/dL (2018 07:38)    LIVER FUNCTIONS - ( 2018 06:18 )  Alb: 1.7 g/dL / Pro: 6.0 g/dL / ALK PHOS: 52 U/L / ALT: 10 U/L DA / AST: 8 U/L / GGT: x             Cultures:  Culture Results:   No growth to date. ( @ 23:16)  Culture Results:   No growth to date. ( @ 23:16)      RADIOLOGY & ADDITIONAL STUDIES:  < from: CT Abdomen and Pelvis No Cont (07.25.18 @ 22:16) >  Findings:    Abdomen: Limited sections through the lung bases demonstrate trace   bilateral pleural effusions, decreased since the previous examination.   Small collapsed hiatal hernia versus distal esophageal mural thickening.   Small bilateral atelectasis.    Evaluation of the abdomen and pelvis is limited by lack of IV and oral   contrast. Stable cholecystectomy clips. Small calcification in the left   hepatic lobe, likely due to prior granulomatous disease. The common bile   duct is not dilated. Allowing for the noncontrast technique, the   pancreas, spleen, and adrenals appear grossly unremarkable. There is a   stable cystin the right kidney. Small nonobstructive calculus in the   left kidney. No hydronephrosis.    Moderate to large amount of stool in the colon. The appendix appears   normal. No bowel obstruction. The stomach is collapsed, rendering   evaluation difficult.    No evidence for free air, ascites, or enlarged lymph node.    Pelvis: The urinary bladder appears grossly unremarkable. Large amount of   stool in the rectum is again noted with mild rectal wall thickening,   adjacent perirectal stranding esperanza 1.2 cm mildly enlarged left   perirectal lymph node. Finding again may represent associated stercoral   colitis; underlying rectal cancer cannot be excluded. Stable air in the   vagina, cervix and endometrium. No evidence for pelvic free fluid.    Impression: No evidence for free air within the abdomen.    Moderate to large amount of stool in the colon. Large amount of stool in   the rectum with mild rectal wall thickening, perirectal stranding and a   mildly enlarged 1.2 cm left perirectal lymph node may represent   associated stercoral colitis. Underlying rectal cancer cannot be excluded.    Air within the vagina, cervix and endometrium again noted, nonspecific.    Trace bilateral pleural effusions, improved.    Small collapsed hiatal hernia versus distal esophageal mural thickening.   EGD may be pursued for further evaluation.    Other findings as above.    < end of copied text >    < from: CT Abdomen and Pelvis w/ Oral Cont and w/ IV Cont (18 @ 16:57) >  IMPRESSION:    Oral contrast has only reached the right colon. The colon is filled with   a large amount of stool. The rectum is markedly distended with a   thickened wall and surrounding inflammatory changes and lymph nodes   suggesting stercoral colitis. The uterus demonstrates air within the   endometrium which is concerning for fistula, however this cannot be   evaluated at this time in that oral contrast has not reached this area.   This would be better evaluated following evacuation of stool and with   rectal administration of contrast or vaginogram.     Small bilateral effusions new since the previous exam.    Thickening of the distal esophagus and cardia of the stomach. Correlate   clinically for pathological processes.      < end of copied text >

## 2018-07-27 NOTE — CONSULT NOTE ADULT - SUBJECTIVE AND OBJECTIVE BOX
HPI:  81 y/o F from home, lives with son, has HHA, nonverbal, bedbound on home hospice, DNR/DNI, with PMH Dementia brought in by daughter in law Carine Younger [phone] for hypotension and fever x 5 days associated w/ vaginal discharge white mucus characteristic. Pt is having 5 movements via the known rectovaginal fistula, small, and non watery - family stopped giving Lactulose and Ativan past few days. Family is currently considering a surgical intervention for the rectovaginal fistula once the patient is more stable. Otherwise family denies any recent travel, sick contacts, vomiting, SOB, or any other complaints. Pt recently discharged in 2018 for sepsis 2/2 stercoral colitis - completed 7 days of Rocephin and Flagyl - Gynecology consulted regarding methylene blue test to be given as enema to r/o rectovaginal fistula but Pt had rectal edema and inflammation so risk of perforation was deemed too high and family decided not to pursue surgery.       PAST MEDICAL & SURGICAL HISTORY:  Urticaria  Dementia with behavioral disturbance, unspecified dementia type  Essential hypertension  No significant past surgical history      No Known Allergies      Meds:  acetaminophen    Suspension 650 milliGRAM(s) Oral every 6 hours PRN  cefTRIAXone   IVPB 1 Gram(s) IV Intermittent every 24 hours  dextrose 5% + sodium chloride 0.9%. 1000 milliLiter(s) IV Continuous <Continuous>  enoxaparin Injectable 40 milliGRAM(s) SubCutaneous daily  LORazepam   Injectable 1 milliGRAM(s) IV Push once  metroNIDAZOLE  IVPB 500 milliGRAM(s) IV Intermittent every 8 hours  morphine   Solution 10 milliGRAM(s) Oral every 6 hours PRN  morphine  - Injectable 1 milliGRAM(s) IV Push every 6 hours PRN  potassium acid phosphate/sodium acid phosphate tablet (K-PHOS No. 2) 1 Tablet(s) Oral once  potassium chloride   Powder 40 milliEquivalent(s) Oral every 4 hours  risperiDONE   Tablet 1 milliGRAM(s) Oral daily  temazepam 30 milliGRAM(s) Oral at bedtime PRN      SOCIAL HISTORY:  Bedbound , Non verbal, on home hospice.       FAMILY HISTORY:  No pertinent family history in first degree relatives      VITALS:  Vital Signs Last 24 Hrs  T(C): 36.7 (2018 05:05), Max: 37 (2018 14:52)  T(F): 98 (2018 05:05), Max: 98.6 (2018 14:52)  HR: 83 (2018 05:05) (76 - 83)  BP: 108/54 (2018 05:05) (104/57 - 109/53)  BP(mean): --  RR: 16 (2018 05:05) (16 - 16)  SpO2: 100% (2018 05:05) (96% - 100%)    LABS/DIAGNOSTIC TESTS:                          9.2    9.9   )-----------( 346      ( 2018 06:18 )             29.7     WBC Count: 9.9 K/uL ( @ 06:18)  WBC Count: 11.2 K/uL ( @ 08:48)  WBC Count: 13.7 K/uL ( @ 19:52)          142  |  110<H>  |  10  ----------------------------<  120<H>  3.0<L>   |  28  |  0.37<L>    Ca    7.7<L>      2018 06:18  Phos  1.6       Mg     1.9         TPro  6.0  /  Alb  1.7<L>  /  TBili  0.3  /  DBili  x   /  AST  8<L>  /  ALT  10  /  AlkPhos  52        Urinalysis Basic - ( 2018 16:55 )    Color: Yellow / Appearance: Clear / S.020 / pH: x  Gluc: x / Ketone: Negative  / Bili: Negative / Urobili: Negative   Blood: x / Protein: Negative / Nitrite: Negative   Leuk Esterase: Moderate / RBC: 5-10 /HPF / WBC 6-10 /HPF   Sq Epi: x / Non Sq Epi: Moderate /HPF / Bacteria: Moderate /HPF        LIVER FUNCTIONS - ( 2018 06:18 )  Alb: 1.7 g/dL / Pro: 6.0 g/dL / ALK PHOS: 52 U/L / ALT: 10 U/L DA / AST: 8 U/L / GGT: x                 LACTATE:  Lactate, Blood: 1.9 mmol/L (18 @ 19:52)      CULTURES:   .Blood Blood-Peripheral   @ 23:16   No growth to date.  --  --      .Urine Catheterized   @ 10:28   50,000 - 99,000 CFU/mL Escherichia coli  --  Escherichia coli      .Blood Blood-Peripheral   @ 00:11   No growth at 5 days.  --  --          RADIOLOGY:    < from: CT Abdomen and Pelvis No Cont (18 @ 22:16) >  No evidence for free air within the abdomen.    Moderate to large amount of stool in the colon. Large amount of stool in   the rectum with mild rectal wall thickening, perirectal stranding and a   mildly enlarged 1.2 cm left perirectal lymph node may represent   associated stercoral colitis. Underlying rectal cancer cannot be excluded.    Air within the vagina, cervix and endometrium again noted, nonspecific.    Trace bilateral pleural effusions, improved.    Small collapsed hiatal hernia versus distal esophageal mural thickening.   EGD may be pursued for further evaluation.    < end of copied text >        ROS  [  ] UNABLE TO ELICIT HPI:  81 y/o F from home, lives with son, has HHA, nonverbal, bedbound on home hospice, DNR/DNI, with PMH Dementia brought in by daughter in law for hypotension and fever x 5 days associated with vaginal discharge white mucus characteristic. Pt is having 5 movements via the known rectovaginal fistula, small, and non watery - family stopped giving Lactulose and Ativan past few days. Otherwise family denies any recent travel, sick contacts, vomiting, SOB, or any other complaints. Pt recently discharged in 2018 for sepsis 2/2 stercoral colitis - completed 7 days of Rocephin and Flagyl.     Patient is afebrile and leukocytosis of 13.7 which is normalized now. CT abdomen showed large amount of stool with possible stercoral colitis. Asked to evaluate for the same. Currently on Rocephin and Flagyl. Blood cultures negative so far.       PAST MEDICAL & SURGICAL HISTORY:  Urticaria  Dementia with behavioral disturbance, unspecified dementia type  Essential hypertension  No significant past surgical history      No Known Allergies      Meds:  acetaminophen    Suspension 650 milliGRAM(s) Oral every 6 hours PRN  cefTRIAXone   IVPB 1 Gram(s) IV Intermittent every 24 hours  dextrose 5% + sodium chloride 0.9%. 1000 milliLiter(s) IV Continuous <Continuous>  enoxaparin Injectable 40 milliGRAM(s) SubCutaneous daily  LORazepam   Injectable 1 milliGRAM(s) IV Push once  metroNIDAZOLE  IVPB 500 milliGRAM(s) IV Intermittent every 8 hours  morphine   Solution 10 milliGRAM(s) Oral every 6 hours PRN  morphine  - Injectable 1 milliGRAM(s) IV Push every 6 hours PRN  potassium acid phosphate/sodium acid phosphate tablet (K-PHOS No. 2) 1 Tablet(s) Oral once  potassium chloride   Powder 40 milliEquivalent(s) Oral every 4 hours  risperiDONE   Tablet 1 milliGRAM(s) Oral daily  temazepam 30 milliGRAM(s) Oral at bedtime PRN      SOCIAL HISTORY:  Bedbound , Non verbal, on home hospice.       FAMILY HISTORY:  No pertinent family history in first degree relatives      VITALS:  Vital Signs Last 24 Hrs  T(C): 36.7 (2018 05:05), Max: 37 (2018 14:52)  T(F): 98 (2018 05:05), Max: 98.6 (2018 14:52)  HR: 83 (2018 05:05) (76 - 83)  BP: 108/54 (2018 05:05) (104/57 - 109/53)  BP(mean): --  RR: 16 (2018 05:05) (16 - 16)  SpO2: 100% (2018 05:05) (96% - 100%)    LABS/DIAGNOSTIC TESTS:                          9.2    9.9   )-----------( 346      ( 2018 06:18 )             29.7     WBC Count: 9.9 K/uL ( @ 06:18)  WBC Count: 11.2 K/uL ( @ 08:48)  WBC Count: 13.7 K/uL ( @ 19:52)          142  |  110<H>  |  10  ----------------------------<  120<H>  3.0<L>   |  28  |  0.37<L>    Ca    7.7<L>      2018 06:18  Phos  1.6       Mg     1.9         TPro  6.0  /  Alb  1.7<L>  /  TBili  0.3  /  DBili  x   /  AST  8<L>  /  ALT  10  /  AlkPhos  52        Urinalysis Basic - ( 2018 16:55 )    Color: Yellow / Appearance: Clear / S.020 / pH: x  Gluc: x / Ketone: Negative  / Bili: Negative / Urobili: Negative   Blood: x / Protein: Negative / Nitrite: Negative   Leuk Esterase: Moderate / RBC: 5-10 /HPF / WBC 6-10 /HPF   Sq Epi: x / Non Sq Epi: Moderate /HPF / Bacteria: Moderate /HPF        LIVER FUNCTIONS - ( 2018 06:18 )  Alb: 1.7 g/dL / Pro: 6.0 g/dL / ALK PHOS: 52 U/L / ALT: 10 U/L DA / AST: 8 U/L / GGT: x                 LACTATE:  Lactate, Blood: 1.9 mmol/L (18 @ 19:52)      CULTURES:   .Blood Blood-Peripheral   @ 23:16   No growth to date.  --  --      .Urine Catheterized  06-01 @ 10:28   50,000 - 99,000 CFU/mL Escherichia coli  --  Escherichia coli      .Blood Blood-Peripheral   @ 00:11   No growth at 5 days.  --  --          RADIOLOGY:    < from: CT Abdomen and Pelvis No Cont (18 @ 22:16) >  No evidence for free air within the abdomen.    Moderate to large amount of stool in the colon. Large amount of stool in   the rectum with mild rectal wall thickening, perirectal stranding and a   mildly enlarged 1.2 cm left perirectal lymph node may represent   associated stercoral colitis. Underlying rectal cancer cannot be excluded.    Air within the vagina, cervix and endometrium again noted, nonspecific.    Trace bilateral pleural effusions, improved.    Small collapsed hiatal hernia versus distal esophageal mural thickening.   EGD may be pursued for further evaluation.    < end of copied text >        ROS  [ x ] UNABLE TO ELICIT HPI:  83 y/o F from home, lives with son, has HHA, nonverbal, bedbound on home hospice, DNR/DNI, with PMH Dementia brought in by daughter in law for hypotension and fever x 5 days associated with vaginal discharge white mucus characteristic. Pt is having 5 movements via the known rectovaginal fistula, small, and non watery. Pt recently discharged in 2018 for sepsis 2/2 stercoral colitis - completed 7 days of Rocephin and Flagyl.     Asked to eval pt as she has vulvar cellulitis. Pt is confused and non verbal and unable to answer questions.    PAST MEDICAL & SURGICAL HISTORY:  Urticaria  Dementia with behavioral disturbance, unspecified dementia type  Essential hypertension  No significant past surgical history      No Known Allergies      Meds:  acetaminophen    Suspension 650 milliGRAM(s) Oral every 6 hours PRN  cefTRIAXone   IVPB 1 Gram(s) IV Intermittent every 24 hours  dextrose 5% + sodium chloride 0.9%. 1000 milliLiter(s) IV Continuous <Continuous>  enoxaparin Injectable 40 milliGRAM(s) SubCutaneous daily  LORazepam   Injectable 1 milliGRAM(s) IV Push once  metroNIDAZOLE  IVPB 500 milliGRAM(s) IV Intermittent every 8 hours  morphine   Solution 10 milliGRAM(s) Oral every 6 hours PRN  morphine  - Injectable 1 milliGRAM(s) IV Push every 6 hours PRN  potassium acid phosphate/sodium acid phosphate tablet (K-PHOS No. 2) 1 Tablet(s) Oral once  potassium chloride   Powder 40 milliEquivalent(s) Oral every 4 hours  risperiDONE   Tablet 1 milliGRAM(s) Oral daily  temazepam 30 milliGRAM(s) Oral at bedtime PRN      SOCIAL HISTORY:  Bedbound , Non verbal, on home hospice.       FAMILY HISTORY:  No pertinent family history in first degree relatives      VITALS:  Vital Signs Last 24 Hrs  T(C): 36.7 (2018 05:05), Max: 37 (2018 14:52)  T(F): 98 (2018 05:05), Max: 98.6 (2018 14:52)  HR: 83 (2018 05:05) (76 - 83)  BP: 108/54 (2018 05:05) (104/57 - 109/53)  BP(mean): --  RR: 16 (2018 05:05) (16 - 16)  SpO2: 100% (2018 05:05) (96% - 100%)    LABS/DIAGNOSTIC TESTS:                          9.2    9.9   )-----------( 346      ( 2018 06:18 )             29.7     WBC Count: 9.9 K/uL ( @ 06:18)  WBC Count: 11.2 K/uL ( @ 08:48)  WBC Count: 13.7 K/uL ( @ 19:52)          142  |  110<H>  |  10  ----------------------------<  120<H>  3.0<L>   |  28  |  0.37<L>    Ca    7.7<L>      2018 06:18  Phos  1.6       Mg     1.9         TPro  6.0  /  Alb  1.7<L>  /  TBili  0.3  /  DBili  x   /  AST  8<L>  /  ALT  10  /  AlkPhos  52        Urinalysis Basic - ( 2018 16:55 )    Color: Yellow / Appearance: Clear / S.020 / pH: x  Gluc: x / Ketone: Negative  / Bili: Negative / Urobili: Negative   Blood: x / Protein: Negative / Nitrite: Negative   Leuk Esterase: Moderate / RBC: 5-10 /HPF / WBC 6-10 /HPF   Sq Epi: x / Non Sq Epi: Moderate /HPF / Bacteria: Moderate /HPF        LIVER FUNCTIONS - ( 2018 06:18 )  Alb: 1.7 g/dL / Pro: 6.0 g/dL / ALK PHOS: 52 U/L / ALT: 10 U/L DA / AST: 8 U/L / GGT: x                 LACTATE:  Lactate, Blood: 1.9 mmol/L (18 @ 19:52)      CULTURES:   .Blood Blood-Peripheral   @ 23:16   No growth to date.  --  --      .Urine Catheterized   @ 10:28   50,000 - 99,000 CFU/mL Escherichia coli  --  Escherichia coli      .Blood Blood-Peripheral   @ 00:11   No growth at 5 days.  --  --          RADIOLOGY:    < from: CT Abdomen and Pelvis No Cont (18 @ 22:16) >  No evidence for free air within the abdomen.    Moderate to large amount of stool in the colon. Large amount of stool in   the rectum with mild rectal wall thickening, perirectal stranding and a   mildly enlarged 1.2 cm left perirectal lymph node may represent   associated stercoral colitis. Underlying rectal cancer cannot be excluded.    Air within the vagina, cervix and endometrium again noted, nonspecific.    Trace bilateral pleural effusions, improved.    Small collapsed hiatal hernia versus distal esophageal mural thickening.   EGD may be pursued for further evaluation.    < end of copied text >        ROS  [ x ] UNABLE TO ELICIT

## 2018-07-27 NOTE — CONSULT NOTE ADULT - GASTROINTESTINAL DETAILS
nontender/no distention/bowel sounds normal/soft
no rebound tenderness/no guarding/nontender/no rigidity/soft/no distention

## 2018-07-27 NOTE — CONSULT NOTE ADULT - RS GEN PE MLT RESP DETAILS PC
respirations non-labored/good air movement
clear to auscultation bilaterally/breath sounds equal/good air movement

## 2018-07-27 NOTE — PROGRESS NOTE ADULT - SUBJECTIVE AND OBJECTIVE BOX
PGY 2 Note discussed with supervising  primary attending    Patient is a 82y old  Female who presents with a chief complaint of hypotension and fever (2018 21:56)      INTERVAL HPI/OVERNIGHT EVENTS: patient seen and examined at bed side, family wants to peruse  to surgical intervention.    offers no new complaints; current symptoms resolving    MEDICATIONS  (STANDING):  ampicillin/sulbactam  IVPB      ampicillin/sulbactam  IVPB 1.5 Gram(s) IV Intermittent once  dextrose 5% + sodium chloride 0.9%. 1000 milliLiter(s) (100 mL/Hr) IV Continuous <Continuous>  enoxaparin Injectable 40 milliGRAM(s) SubCutaneous daily  risperiDONE   Tablet 1 milliGRAM(s) Oral daily    MEDICATIONS  (PRN):  acetaminophen    Suspension 650 milliGRAM(s) Oral every 6 hours PRN For Temp greater than 38 C (100.4 F)  morphine   Solution 10 milliGRAM(s) Oral every 6 hours PRN Moderate Pain (4 - 6)  morphine  - Injectable 1 milliGRAM(s) IV Push every 6 hours PRN Severe Pain (7 - 10)  temazepam 30 milliGRAM(s) Oral at bedtime PRN Insomnia      __________________________________________________  REVIEW OF SYSTEMS:    CONSTITUTIONAL: No fever,   EYES: no acute visual disturbances  NECK: No pain or stiffness  RESPIRATORY: No cough; No shortness of breath  CARDIOVASCULAR: No chest pain, no palpitations  GASTROINTESTINAL: No pain. No nausea or vomiting; No diarrhea   NEUROLOGICAL: No headache or numbness, no tremors  MUSCULOSKELETAL: No joint pain, no muscle pain  GENITOURINARY: no dysuria, no frequency, no hesitancy  PSYCHIATRY: no depression , no anxiety  ALL OTHER  ROS negative        Vital Signs Last 24 Hrs  T(C): 36.8 (2018 14:32), Max: 36.8 (2018 14:32)  T(F): 98.2 (2018 14:32), Max: 98.2 (2018 14:32)  HR: 72 (2018 14:32) (72 - 83)  BP: 137/53 (2018 14:32) (108/54 - 137/53)  BP(mean): --  RR: 16 (2018 14:32) (16 - 16)  SpO2: 95% (2018 14:32) (95% - 100%)    ________________________________________________  PHYSICAL EXAM:  GENERAL: non verbal   HEENT: Normocephalic;  conjunctivae and sclerae clear; moist mucous membranes;   NECK : supple  CHEST/LUNG: Clear to auscultation bilaterally with good air entry   HEART: S1 S2  regular; no murmurs, gallops or rubs  ABDOMEN: Soft, Nontender, Nondistended; Bowel sounds present  EXTREMITIES: no cyanosis; no edema; no calf tenderness  SKIN: warm and dry; no rash  NERVOUS SYSTEM AAox0    _________________________________________________  LABS:                        9.2    9.9   )-----------( 346      ( 2018 06:18 )             29.7     07-    142  |  110<H>  |  10  ----------------------------<  120<H>  3.0<L>   |  28  |  0.37<L>    Ca    7.7<L>      2018 06:18  Phos  1.6       Mg     1.9         TPro  6.0  /  Alb  1.7<L>  /  TBili  0.3  /  DBili  x   /  AST  8<L>  /  ALT  10  /  AlkPhos  52        Urinalysis Basic - ( 2018 16:55 )    Color: Yellow / Appearance: Clear / S.020 / pH: x  Gluc: x / Ketone: Negative  / Bili: Negative / Urobili: Negative   Blood: x / Protein: Negative / Nitrite: Negative   Leuk Esterase: Moderate / RBC: 5-10 /HPF / WBC 6-10 /HPF   Sq Epi: x / Non Sq Epi: Moderate /HPF / Bacteria: Moderate /HPF      CAPILLARY BLOOD GLUCOSE      POCT Blood Glucose.: 120 mg/dL (2018 07:38)        RADIOLOGY & ADDITIONAL TESTS:    Imaging Personally Reviewed:  YES    Consultant(s) Notes Reviewed:   YES    Care Discussed with Consultants :     Plan of care was discussed with patient and /or primary care giver; all questions and concerns were addressed and care was aligned with patient's wishes.

## 2018-07-27 NOTE — CONSULT NOTE ADULT - ASSESSMENT
vulvar  cellulitis   colo - vulvar fistula    plan - dc rocephin and flagyl  start unasyn 1.5 gms iv q6hrs  agree with diverting colostomy
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

## 2018-07-27 NOTE — CONSULT NOTE ADULT - CONSTITUTIONAL DETAILS
no distress/well-groomed/well-developed/well-nourished
no distress/well-groomed/well-developed/well-nourished

## 2018-07-27 NOTE — PROGRESS NOTE ADULT - PROBLEM SELECTOR PLAN 1
P/w F x 5 days w/ increased rectovaginal output and tender abdominal exam  - switch ABx to Unasyn   CT abd showed the rectum with mild rectal wall thickening, perirectal stranding and a mildly enlarged 1.2 cm left perirectal lymph node may represent   associated stercoral colitis. Air within the vagina, cervix and endometrium again noted, nonspecific.  surgery consulted for possible colostomy.  Id Dr Haley consult appreciated

## 2018-07-28 DIAGNOSIS — N82.4 OTHER FEMALE INTESTINAL-GENITAL TRACT FISTULAE: ICD-10-CM

## 2018-07-28 DIAGNOSIS — N76.2 ACUTE VULVITIS: ICD-10-CM

## 2018-07-28 LAB
ANION GAP SERPL CALC-SCNC: 6 MMOL/L — SIGNIFICANT CHANGE UP (ref 5–17)
BUN SERPL-MCNC: 7 MG/DL — SIGNIFICANT CHANGE UP (ref 7–18)
CALCIUM SERPL-MCNC: 8 MG/DL — LOW (ref 8.4–10.5)
CEA SERPL-MCNC: 3.1 NG/ML — SIGNIFICANT CHANGE UP (ref 0–3.8)
CHLORIDE SERPL-SCNC: 108 MMOL/L — SIGNIFICANT CHANGE UP (ref 96–108)
CO2 SERPL-SCNC: 28 MMOL/L — SIGNIFICANT CHANGE UP (ref 22–31)
CREAT SERPL-MCNC: 0.46 MG/DL — LOW (ref 0.5–1.3)
GLUCOSE SERPL-MCNC: 127 MG/DL — HIGH (ref 70–99)
HCT VFR BLD CALC: 33.9 % — LOW (ref 34.5–45)
HGB BLD-MCNC: 10.6 G/DL — LOW (ref 11.5–15.5)
MAGNESIUM SERPL-MCNC: 2.1 MG/DL — SIGNIFICANT CHANGE UP (ref 1.6–2.6)
MCHC RBC-ENTMCNC: 26.6 PG — LOW (ref 27–34)
MCHC RBC-ENTMCNC: 31.2 GM/DL — LOW (ref 32–36)
MCV RBC AUTO: 85.2 FL — SIGNIFICANT CHANGE UP (ref 80–100)
PHOSPHATE SERPL-MCNC: 2.2 MG/DL — LOW (ref 2.5–4.5)
PLATELET # BLD AUTO: 361 K/UL — SIGNIFICANT CHANGE UP (ref 150–400)
POTASSIUM SERPL-MCNC: 3.2 MMOL/L — LOW (ref 3.5–5.3)
POTASSIUM SERPL-SCNC: 3.2 MMOL/L — LOW (ref 3.5–5.3)
RBC # BLD: 3.98 M/UL — SIGNIFICANT CHANGE UP (ref 3.8–5.2)
RBC # FLD: 13 % — SIGNIFICANT CHANGE UP (ref 10.3–14.5)
SODIUM SERPL-SCNC: 142 MMOL/L — SIGNIFICANT CHANGE UP (ref 135–145)
WBC # BLD: 10 K/UL — SIGNIFICANT CHANGE UP (ref 3.8–10.5)
WBC # FLD AUTO: 10 K/UL — SIGNIFICANT CHANGE UP (ref 3.8–10.5)

## 2018-07-28 PROCEDURE — 99233 SBSQ HOSP IP/OBS HIGH 50: CPT | Mod: GC

## 2018-07-28 RX ORDER — LACTULOSE 10 G/15ML
10 SOLUTION ORAL ONCE
Qty: 0 | Refills: 0 | Status: COMPLETED | OUTPATIENT
Start: 2018-07-28 | End: 2018-07-28

## 2018-07-28 RX ORDER — POTASSIUM CHLORIDE 20 MEQ
40 PACKET (EA) ORAL EVERY 4 HOURS
Qty: 0 | Refills: 0 | Status: DISCONTINUED | OUTPATIENT
Start: 2018-07-28 | End: 2018-07-28

## 2018-07-28 RX ORDER — POTASSIUM CHLORIDE 20 MEQ
10 PACKET (EA) ORAL
Qty: 0 | Refills: 0 | Status: COMPLETED | OUTPATIENT
Start: 2018-07-28 | End: 2018-07-28

## 2018-07-28 RX ORDER — POTASSIUM PHOSPHATE, MONOBASIC POTASSIUM PHOSPHATE, DIBASIC 236; 224 MG/ML; MG/ML
15 INJECTION, SOLUTION INTRAVENOUS ONCE
Qty: 0 | Refills: 0 | Status: COMPLETED | OUTPATIENT
Start: 2018-07-28 | End: 2018-07-28

## 2018-07-28 RX ORDER — SODIUM,POTASSIUM PHOSPHATES 278-250MG
1 POWDER IN PACKET (EA) ORAL
Qty: 0 | Refills: 0 | Status: DISCONTINUED | OUTPATIENT
Start: 2018-07-28 | End: 2018-07-28

## 2018-07-28 RX ADMIN — MORPHINE SULFATE 10 MILLIGRAM(S): 50 CAPSULE, EXTENDED RELEASE ORAL at 21:35

## 2018-07-28 RX ADMIN — MORPHINE SULFATE 10 MILLIGRAM(S): 50 CAPSULE, EXTENDED RELEASE ORAL at 05:15

## 2018-07-28 RX ADMIN — SODIUM CHLORIDE 100 MILLILITER(S): 9 INJECTION, SOLUTION INTRAVENOUS at 05:15

## 2018-07-28 RX ADMIN — MORPHINE SULFATE 1 MILLIGRAM(S): 50 CAPSULE, EXTENDED RELEASE ORAL at 01:43

## 2018-07-28 RX ADMIN — POTASSIUM PHOSPHATE, MONOBASIC POTASSIUM PHOSPHATE, DIBASIC 62.5 MILLIMOLE(S): 236; 224 INJECTION, SOLUTION INTRAVENOUS at 11:23

## 2018-07-28 RX ADMIN — TEMAZEPAM 30 MILLIGRAM(S): 15 CAPSULE ORAL at 21:11

## 2018-07-28 RX ADMIN — MORPHINE SULFATE 10 MILLIGRAM(S): 50 CAPSULE, EXTENDED RELEASE ORAL at 20:34

## 2018-07-28 RX ADMIN — AMPICILLIN SODIUM AND SULBACTAM SODIUM 100 GRAM(S): 250; 125 INJECTION, POWDER, FOR SUSPENSION INTRAMUSCULAR; INTRAVENOUS at 17:18

## 2018-07-28 RX ADMIN — MORPHINE SULFATE 1 MILLIGRAM(S): 50 CAPSULE, EXTENDED RELEASE ORAL at 22:15

## 2018-07-28 RX ADMIN — MORPHINE SULFATE 1 MILLIGRAM(S): 50 CAPSULE, EXTENDED RELEASE ORAL at 21:11

## 2018-07-28 RX ADMIN — MORPHINE SULFATE 1 MILLIGRAM(S): 50 CAPSULE, EXTENDED RELEASE ORAL at 14:40

## 2018-07-28 RX ADMIN — MORPHINE SULFATE 1 MILLIGRAM(S): 50 CAPSULE, EXTENDED RELEASE ORAL at 15:16

## 2018-07-28 RX ADMIN — Medication 100 MILLIEQUIVALENT(S): at 10:23

## 2018-07-28 RX ADMIN — LACTULOSE 10 GRAM(S): 10 SOLUTION ORAL at 11:22

## 2018-07-28 RX ADMIN — ENOXAPARIN SODIUM 40 MILLIGRAM(S): 100 INJECTION SUBCUTANEOUS at 11:24

## 2018-07-28 RX ADMIN — MORPHINE SULFATE 10 MILLIGRAM(S): 50 CAPSULE, EXTENDED RELEASE ORAL at 06:30

## 2018-07-28 RX ADMIN — RISPERIDONE 1 MILLIGRAM(S): 4 TABLET ORAL at 08:14

## 2018-07-28 RX ADMIN — Medication 100 MILLIEQUIVALENT(S): at 11:24

## 2018-07-28 RX ADMIN — MORPHINE SULFATE 1 MILLIGRAM(S): 50 CAPSULE, EXTENDED RELEASE ORAL at 00:58

## 2018-07-28 RX ADMIN — AMPICILLIN SODIUM AND SULBACTAM SODIUM 100 GRAM(S): 250; 125 INJECTION, POWDER, FOR SUSPENSION INTRAMUSCULAR; INTRAVENOUS at 05:15

## 2018-07-28 RX ADMIN — Medication 100 MILLIEQUIVALENT(S): at 09:38

## 2018-07-28 RX ADMIN — SODIUM CHLORIDE 100 MILLILITER(S): 9 INJECTION, SOLUTION INTRAVENOUS at 23:07

## 2018-07-28 RX ADMIN — AMPICILLIN SODIUM AND SULBACTAM SODIUM 100 GRAM(S): 250; 125 INJECTION, POWDER, FOR SUSPENSION INTRAMUSCULAR; INTRAVENOUS at 11:23

## 2018-07-28 RX ADMIN — AMPICILLIN SODIUM AND SULBACTAM SODIUM 100 GRAM(S): 250; 125 INJECTION, POWDER, FOR SUSPENSION INTRAMUSCULAR; INTRAVENOUS at 23:08

## 2018-07-28 NOTE — PROGRESS NOTE ADULT - SUBJECTIVE AND OBJECTIVE BOX
PGY2 Note discussed with supervising resident and primary attending.    Patient is a 82y old  Female who presents with a chief complaint of hypotension and fever (2018 21:56)      INTERVAL HPI/OVERNIGHT EVENTS: no new overnight events.    MEDICATIONS  (STANDING):  ampicillin/sulbactam  IVPB      ampicillin/sulbactam  IVPB 1.5 Gram(s) IV Intermittent every 6 hours  dextrose 5% + sodium chloride 0.9%. 1000 milliLiter(s) (100 mL/Hr) IV Continuous <Continuous>  enoxaparin Injectable 40 milliGRAM(s) SubCutaneous daily  risperiDONE   Tablet 1 milliGRAM(s) Oral daily    MEDICATIONS  (PRN):  acetaminophen    Suspension 650 milliGRAM(s) Oral every 6 hours PRN For Temp greater than 38 C (100.4 F)  morphine   Solution 10 milliGRAM(s) Oral every 6 hours PRN Moderate Pain (4 - 6)  morphine  - Injectable 1 milliGRAM(s) IV Push every 6 hours PRN Severe Pain (7 - 10)  temazepam 30 milliGRAM(s) Oral at bedtime PRN Insomnia      Allergies    No Known Allergies    Intolerances      REVIEW OF SYSTEMS:    CONSTITUTIONAL: No fever,   EYES: no acute visual disturbances  NECK: No pain or stiffness  RESPIRATORY: No cough; No shortness of breath  CARDIOVASCULAR: No chest pain, no palpitations  GASTROINTESTINAL: No pain. No nausea or vomiting; No diarrhea   NEUROLOGICAL: No headache or numbness, no tremors  MUSCULOSKELETAL: No joint pain, no muscle pain  GENITOURINARY: no dysuria, no frequency, no hesitancy  PSYCHIATRY: no depression , no anxiety    Vital Signs Last 24 Hrs  T(C): 36.5 (2018 05:00), Max: 36.8 (2018 14:32)  T(F): 97.7 (2018 05:00), Max: 98.2 (2018 14:32)  HR: 71 (2018 05:00) (71 - 72)  BP: 113/54 (2018 05:00) (113/54 - 137/53)  BP(mean): --  RR: 18 (2018 05:00) (16 - 18)  SpO2: 99% (2018 05:00) (95% - 99%)    PHYSICAL EXAM:  GENERAL: non verbal   HEENT: Normocephalic;  conjunctivae and sclerae clear; moist mucous membranes;   NECK : supple  CHEST/LUNG: Clear to auscultation bilaterally with good air entry   HEART: S1 S2  regular; no murmurs, gallops or rubs  ABDOMEN: Soft, Nontender, Nondistended; Bowel sounds present  EXTREMITIES: no cyanosis; no edema; no calf tenderness  SKIN: warm and dry; no rash  NERVOUS SYSTEM AAox0    LABS:                        9.2    9.9   )-----------( 346      ( 2018 06:18 )             29.7         142  |  110<H>  |  10  ----------------------------<  120<H>  3.0<L>   |  28  |  0.37<L>    Ca    7.7<L>      2018 06:18  Phos  1.6       Mg     1.9         TPro  6.0  /  Alb  1.7<L>  /  TBili  0.3  /  DBili  x   /  AST  8<L>  /  ALT  10  /  AlkPhos  52        Urinalysis Basic - ( 2018 16:55 )    Color: Yellow / Appearance: Clear / S.020 / pH: x  Gluc: x / Ketone: Negative  / Bili: Negative / Urobili: Negative   Blood: x / Protein: Negative / Nitrite: Negative   Leuk Esterase: Moderate / RBC: 5-10 /HPF / WBC 6-10 /HPF   Sq Epi: x / Non Sq Epi: Moderate /HPF / Bacteria: Moderate /HPF      CAPILLARY BLOOD GLUCOSE          RADIOLOGY & ADDITIONAL TESTS:    Imaging Personally Reviewed:  [ x] YES  [ ] NO    Consultant(s) Notes Reviewed:  [ x] YES  [ ] NO PGY2 Note discussed with supervising resident and primary attending.    Patient is a 82y old  Female who presents with a chief complaint of hypotension and fever (2018 21:56)      INTERVAL HPI/OVERNIGHT EVENTS: Pt was seen and examined at bedside, alert , awake, nonverbal, bedbound, no acute overnight events.     MEDICATIONS  (STANDING):  ampicillin/sulbactam  IVPB      ampicillin/sulbactam  IVPB 1.5 Gram(s) IV Intermittent every 6 hours  dextrose 5% + sodium chloride 0.9%. 1000 milliLiter(s) (100 mL/Hr) IV Continuous <Continuous>  enoxaparin Injectable 40 milliGRAM(s) SubCutaneous daily  risperiDONE   Tablet 1 milliGRAM(s) Oral daily    MEDICATIONS  (PRN):  acetaminophen    Suspension 650 milliGRAM(s) Oral every 6 hours PRN For Temp greater than 38 C (100.4 F)  morphine   Solution 10 milliGRAM(s) Oral every 6 hours PRN Moderate Pain (4 - 6)  morphine  - Injectable 1 milliGRAM(s) IV Push every 6 hours PRN Severe Pain (7 - 10)  temazepam 30 milliGRAM(s) Oral at bedtime PRN Insomnia      Allergies    No Known Allergies    Intolerances      REVIEW OF SYSTEMS:  unable to obtain     Vital Signs Last 24 Hrs  T(C): 36.5 (2018 05:00), Max: 36.8 (2018 14:32)  T(F): 97.7 (2018 05:00), Max: 98.2 (2018 14:32)  HR: 71 (2018 05:00) (71 - 72)  BP: 113/54 (2018 05:00) (113/54 - 137/53)  BP(mean): --  RR: 18 (2018 05:00) (16 - 18)  SpO2: 99% (2018 05:00) (95% - 99%)    PHYSICAL EXAM:  GENERAL: non verbal   HEENT: Normocephalic;  conjunctivae and sclerae clear; moist mucous membranes;   NECK : supple  CHEST/LUNG: Clear to auscultation bilaterally with good air entry   HEART: S1 S2  regular; no murmurs, gallops or rubs  ABDOMEN: Soft, Nontender, Nondistended; Bowel sounds present  EXTREMITIES: no cyanosis; no edema; no calf tenderness  SKIN: warm and dry; no rash  NERVOUS SYSTEM AAox0    LABS:                        9.2    9.9   )-----------( 346      ( 2018 06:18 )             29.7         142  |  110<H>  |  10  ----------------------------<  120<H>  3.0<L>   |  28  |  0.37<L>    Ca    7.7<L>      2018 06:18  Phos  1.6       Mg     1.9         TPro  6.0  /  Alb  1.7<L>  /  TBili  0.3  /  DBili  x   /  AST  8<L>  /  ALT  10  /  AlkPhos  52        Urinalysis Basic - ( 2018 16:55 )    Color: Yellow / Appearance: Clear / S.020 / pH: x  Gluc: x / Ketone: Negative  / Bili: Negative / Urobili: Negative   Blood: x / Protein: Negative / Nitrite: Negative   Leuk Esterase: Moderate / RBC: 5-10 /HPF / WBC 6-10 /HPF   Sq Epi: x / Non Sq Epi: Moderate /HPF / Bacteria: Moderate /HPF      CAPILLARY BLOOD GLUCOSE          RADIOLOGY & ADDITIONAL TESTS:    Imaging Personally Reviewed:  [ x] YES  [ ] NO    Consultant(s) Notes Reviewed:  [ x] YES  [ ] NO

## 2018-07-28 NOTE — PROGRESS NOTE ADULT - PROBLEM SELECTOR PLAN 1
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

## 2018-07-29 LAB
ANION GAP SERPL CALC-SCNC: 5 MMOL/L — SIGNIFICANT CHANGE UP (ref 5–17)
BASOPHILS # BLD AUTO: 0.1 K/UL — SIGNIFICANT CHANGE UP (ref 0–0.2)
BASOPHILS NFR BLD AUTO: 1.1 % — SIGNIFICANT CHANGE UP (ref 0–2)
BUN SERPL-MCNC: 5 MG/DL — LOW (ref 7–18)
CALCIUM SERPL-MCNC: 7.6 MG/DL — LOW (ref 8.4–10.5)
CHLORIDE SERPL-SCNC: 108 MMOL/L — SIGNIFICANT CHANGE UP (ref 96–108)
CO2 SERPL-SCNC: 27 MMOL/L — SIGNIFICANT CHANGE UP (ref 22–31)
CREAT SERPL-MCNC: 0.44 MG/DL — LOW (ref 0.5–1.3)
EOSINOPHIL # BLD AUTO: 0.2 K/UL — SIGNIFICANT CHANGE UP (ref 0–0.5)
EOSINOPHIL NFR BLD AUTO: 1.6 % — SIGNIFICANT CHANGE UP (ref 0–6)
GLUCOSE SERPL-MCNC: 124 MG/DL — HIGH (ref 70–99)
HCT VFR BLD CALC: 32.1 % — LOW (ref 34.5–45)
HGB BLD-MCNC: 9.9 G/DL — LOW (ref 11.5–15.5)
LYMPHOCYTES # BLD AUTO: 2.7 K/UL — SIGNIFICANT CHANGE UP (ref 1–3.3)
LYMPHOCYTES # BLD AUTO: 27 % — SIGNIFICANT CHANGE UP (ref 13–44)
MAGNESIUM SERPL-MCNC: 2 MG/DL — SIGNIFICANT CHANGE UP (ref 1.6–2.6)
MCHC RBC-ENTMCNC: 26 PG — LOW (ref 27–34)
MCHC RBC-ENTMCNC: 30.8 GM/DL — LOW (ref 32–36)
MCV RBC AUTO: 84.4 FL — SIGNIFICANT CHANGE UP (ref 80–100)
MONOCYTES # BLD AUTO: 0.6 K/UL — SIGNIFICANT CHANGE UP (ref 0–0.9)
MONOCYTES NFR BLD AUTO: 6.5 % — SIGNIFICANT CHANGE UP (ref 2–14)
NEUTROPHILS # BLD AUTO: 6.3 K/UL — SIGNIFICANT CHANGE UP (ref 1.8–7.4)
NEUTROPHILS NFR BLD AUTO: 63.8 % — SIGNIFICANT CHANGE UP (ref 43–77)
PHOSPHATE SERPL-MCNC: 2.3 MG/DL — LOW (ref 2.5–4.5)
PLATELET # BLD AUTO: 343 K/UL — SIGNIFICANT CHANGE UP (ref 150–400)
POTASSIUM SERPL-MCNC: 3.5 MMOL/L — SIGNIFICANT CHANGE UP (ref 3.5–5.3)
POTASSIUM SERPL-SCNC: 3.5 MMOL/L — SIGNIFICANT CHANGE UP (ref 3.5–5.3)
RBC # BLD: 3.8 M/UL — SIGNIFICANT CHANGE UP (ref 3.8–5.2)
RBC # FLD: 13.4 % — SIGNIFICANT CHANGE UP (ref 10.3–14.5)
SODIUM SERPL-SCNC: 140 MMOL/L — SIGNIFICANT CHANGE UP (ref 135–145)
WBC # BLD: 9.9 K/UL — SIGNIFICANT CHANGE UP (ref 3.8–10.5)
WBC # FLD AUTO: 9.9 K/UL — SIGNIFICANT CHANGE UP (ref 3.8–10.5)

## 2018-07-29 PROCEDURE — 99233 SBSQ HOSP IP/OBS HIGH 50: CPT

## 2018-07-29 RX ORDER — NYSTATIN CREAM 100000 [USP'U]/G
1 CREAM TOPICAL
Qty: 0 | Refills: 0 | Status: DISCONTINUED | OUTPATIENT
Start: 2018-07-29 | End: 2018-08-02

## 2018-07-29 RX ADMIN — MORPHINE SULFATE 1 MILLIGRAM(S): 50 CAPSULE, EXTENDED RELEASE ORAL at 03:22

## 2018-07-29 RX ADMIN — ENOXAPARIN SODIUM 40 MILLIGRAM(S): 100 INJECTION SUBCUTANEOUS at 13:27

## 2018-07-29 RX ADMIN — SODIUM CHLORIDE 100 MILLILITER(S): 9 INJECTION, SOLUTION INTRAVENOUS at 13:28

## 2018-07-29 RX ADMIN — RISPERIDONE 1 MILLIGRAM(S): 4 TABLET ORAL at 09:13

## 2018-07-29 RX ADMIN — NYSTATIN CREAM 1 APPLICATION(S): 100000 CREAM TOPICAL at 17:43

## 2018-07-29 RX ADMIN — MORPHINE SULFATE 1 MILLIGRAM(S): 50 CAPSULE, EXTENDED RELEASE ORAL at 23:00

## 2018-07-29 RX ADMIN — NYSTATIN CREAM 1 APPLICATION(S): 100000 CREAM TOPICAL at 05:04

## 2018-07-29 RX ADMIN — AMPICILLIN SODIUM AND SULBACTAM SODIUM 100 GRAM(S): 250; 125 INJECTION, POWDER, FOR SUSPENSION INTRAMUSCULAR; INTRAVENOUS at 17:43

## 2018-07-29 RX ADMIN — MORPHINE SULFATE 1 MILLIGRAM(S): 50 CAPSULE, EXTENDED RELEASE ORAL at 03:42

## 2018-07-29 RX ADMIN — MORPHINE SULFATE 1 MILLIGRAM(S): 50 CAPSULE, EXTENDED RELEASE ORAL at 15:30

## 2018-07-29 RX ADMIN — MORPHINE SULFATE 1 MILLIGRAM(S): 50 CAPSULE, EXTENDED RELEASE ORAL at 22:36

## 2018-07-29 RX ADMIN — AMPICILLIN SODIUM AND SULBACTAM SODIUM 100 GRAM(S): 250; 125 INJECTION, POWDER, FOR SUSPENSION INTRAMUSCULAR; INTRAVENOUS at 13:27

## 2018-07-29 RX ADMIN — AMPICILLIN SODIUM AND SULBACTAM SODIUM 100 GRAM(S): 250; 125 INJECTION, POWDER, FOR SUSPENSION INTRAMUSCULAR; INTRAVENOUS at 05:04

## 2018-07-29 RX ADMIN — MORPHINE SULFATE 1 MILLIGRAM(S): 50 CAPSULE, EXTENDED RELEASE ORAL at 15:45

## 2018-07-29 NOTE — PROGRESS NOTE ADULT - SUBJECTIVE AND OBJECTIVE BOX
Medical attending note.     Patient is a 82y old  Female who presents with a chief complaint of hypotension and fever (25 Jul 2018 21:56)      INTERVAL HPI/OVERNIGHT EVENTS: Pt was seen and examined at bedside, alert , awake, nonverbal, bedbound, no acute overnight events. Daughter and aid were present.     MEDICATIONS  (STANDING):  ampicillin/sulbactam  IVPB      ampicillin/sulbactam  IVPB 1.5 Gram(s) IV Intermittent every 6 hours  dextrose 5% + sodium chloride 0.9%. 1000 milliLiter(s) (100 mL/Hr) IV Continuous <Continuous>  enoxaparin Injectable 40 milliGRAM(s) SubCutaneous daily  risperiDONE   Tablet 1 milliGRAM(s) Oral daily    MEDICATIONS  (PRN):  acetaminophen    Suspension 650 milliGRAM(s) Oral every 6 hours PRN For Temp greater than 38 C (100.4 F)  morphine   Solution 10 milliGRAM(s) Oral every 6 hours PRN Moderate Pain (4 - 6)  morphine  - Injectable 1 milliGRAM(s) IV Push every 6 hours PRN Severe Pain (7 - 10)  temazepam 30 milliGRAM(s) Oral at bedtime PRN Insomnia      Allergies    No Known Allergies    Intolerances      REVIEW OF SYSTEMS:  unable to obtain     Vital Signs Last 24 Hrs  T(C): 36.6 (29 Jul 2018 13:55), Max: 37 (29 Jul 2018 05:35)  T(F): 97.9 (29 Jul 2018 13:55), Max: 98.6 (29 Jul 2018 05:35)  HR: 78 (29 Jul 2018 13:55) (68 - 87)  BP: 129/63 (29 Jul 2018 13:55) (117/52 - 130/98)  BP(mean): --  RR: 17 (29 Jul 2018 13:55) (17 - 17)  SpO2: 97% (29 Jul 2018 13:55) (95% - 97%)  PHYSICAL EXAM:  GENERAL: non verbal   HEENT: Normocephalic;  conjunctivae and sclerae clear; moist mucous membranes;   NECK : supple  CHEST/LUNG: Clear to auscultation bilaterally with good air entry   HEART: S1 S2  regular; no murmurs, gallops or rubs  ABDOMEN: Soft, Nontender, Nondistended; Bowel sounds present  EXTREMITIES: no cyanosis; no edema; no calf tenderness  SKIN: warm and dry; no rash  Genitalia, vulvar induration involves about 1/2 of the tissue that it did on admission.   NERVOUS SYSTEM AAox0                            9.9    9.9   )-----------( 343      ( 29 Jul 2018 07:26 )             32.1   07-29    140  |  108  |  5<L>  ----------------------------<  124<H>  3.5   |  27  |  0.44<L>    Ca    7.6<L>      29 Jul 2018 07:26  Phos  2.3     07-29  Mg     2.0     07-29        Consultant(s) Notes Reviewed:  [ x] YES  [ ] NO

## 2018-07-29 NOTE — PROGRESS NOTE ADULT - PROBLEM SELECTOR PLAN 1
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

## 2018-07-30 LAB
ANION GAP SERPL CALC-SCNC: 8 MMOL/L — SIGNIFICANT CHANGE UP (ref 5–17)
BASOPHILS # BLD AUTO: 0.1 K/UL — SIGNIFICANT CHANGE UP (ref 0–0.2)
BASOPHILS NFR BLD AUTO: 0.7 % — SIGNIFICANT CHANGE UP (ref 0–2)
BUN SERPL-MCNC: 7 MG/DL — SIGNIFICANT CHANGE UP (ref 7–18)
CALCIUM SERPL-MCNC: 8 MG/DL — LOW (ref 8.4–10.5)
CHLORIDE SERPL-SCNC: 108 MMOL/L — SIGNIFICANT CHANGE UP (ref 96–108)
CO2 SERPL-SCNC: 24 MMOL/L — SIGNIFICANT CHANGE UP (ref 22–31)
CREAT SERPL-MCNC: 0.49 MG/DL — LOW (ref 0.5–1.3)
EOSINOPHIL # BLD AUTO: 0.1 K/UL — SIGNIFICANT CHANGE UP (ref 0–0.5)
EOSINOPHIL NFR BLD AUTO: 0.7 % — SIGNIFICANT CHANGE UP (ref 0–6)
GLUCOSE SERPL-MCNC: 145 MG/DL — HIGH (ref 70–99)
HCT VFR BLD CALC: 34.7 % — SIGNIFICANT CHANGE UP (ref 34.5–45)
HGB BLD-MCNC: 11.1 G/DL — LOW (ref 11.5–15.5)
LYMPHOCYTES # BLD AUTO: 19.9 % — SIGNIFICANT CHANGE UP (ref 13–44)
LYMPHOCYTES # BLD AUTO: 2.3 K/UL — SIGNIFICANT CHANGE UP (ref 1–3.3)
MCHC RBC-ENTMCNC: 26.6 PG — LOW (ref 27–34)
MCHC RBC-ENTMCNC: 31.9 GM/DL — LOW (ref 32–36)
MCV RBC AUTO: 83.4 FL — SIGNIFICANT CHANGE UP (ref 80–100)
MONOCYTES # BLD AUTO: 0.7 K/UL — SIGNIFICANT CHANGE UP (ref 0–0.9)
MONOCYTES NFR BLD AUTO: 6 % — SIGNIFICANT CHANGE UP (ref 2–14)
NEUTROPHILS # BLD AUTO: 8.4 K/UL — HIGH (ref 1.8–7.4)
NEUTROPHILS NFR BLD AUTO: 72.7 % — SIGNIFICANT CHANGE UP (ref 43–77)
PHOSPHATE SERPL-MCNC: 2.6 MG/DL — SIGNIFICANT CHANGE UP (ref 2.5–4.5)
PLATELET # BLD AUTO: 383 K/UL — SIGNIFICANT CHANGE UP (ref 150–400)
POTASSIUM SERPL-MCNC: 3.4 MMOL/L — LOW (ref 3.5–5.3)
POTASSIUM SERPL-SCNC: 3.4 MMOL/L — LOW (ref 3.5–5.3)
RBC # BLD: 4.16 M/UL — SIGNIFICANT CHANGE UP (ref 3.8–5.2)
RBC # FLD: 13.3 % — SIGNIFICANT CHANGE UP (ref 10.3–14.5)
SODIUM SERPL-SCNC: 140 MMOL/L — SIGNIFICANT CHANGE UP (ref 135–145)
WBC # BLD: 11.5 K/UL — HIGH (ref 3.8–10.5)
WBC # FLD AUTO: 11.5 K/UL — HIGH (ref 3.8–10.5)

## 2018-07-30 PROCEDURE — 99233 SBSQ HOSP IP/OBS HIGH 50: CPT

## 2018-07-30 RX ORDER — ACETAMINOPHEN 500 MG
650 TABLET ORAL ONCE
Qty: 0 | Refills: 0 | Status: COMPLETED | OUTPATIENT
Start: 2018-07-30 | End: 2018-07-30

## 2018-07-30 RX ORDER — POTASSIUM CHLORIDE 20 MEQ
10 PACKET (EA) ORAL
Qty: 0 | Refills: 0 | Status: COMPLETED | OUTPATIENT
Start: 2018-07-30 | End: 2018-07-30

## 2018-07-30 RX ADMIN — AMPICILLIN SODIUM AND SULBACTAM SODIUM 100 GRAM(S): 250; 125 INJECTION, POWDER, FOR SUSPENSION INTRAMUSCULAR; INTRAVENOUS at 17:47

## 2018-07-30 RX ADMIN — Medication 100 MILLIEQUIVALENT(S): at 11:36

## 2018-07-30 RX ADMIN — RISPERIDONE 1 MILLIGRAM(S): 4 TABLET ORAL at 08:19

## 2018-07-30 RX ADMIN — SODIUM CHLORIDE 100 MILLILITER(S): 9 INJECTION, SOLUTION INTRAVENOUS at 06:32

## 2018-07-30 RX ADMIN — MORPHINE SULFATE 1 MILLIGRAM(S): 50 CAPSULE, EXTENDED RELEASE ORAL at 17:43

## 2018-07-30 RX ADMIN — TEMAZEPAM 30 MILLIGRAM(S): 15 CAPSULE ORAL at 01:45

## 2018-07-30 RX ADMIN — MORPHINE SULFATE 1 MILLIGRAM(S): 50 CAPSULE, EXTENDED RELEASE ORAL at 18:00

## 2018-07-30 RX ADMIN — AMPICILLIN SODIUM AND SULBACTAM SODIUM 100 GRAM(S): 250; 125 INJECTION, POWDER, FOR SUSPENSION INTRAMUSCULAR; INTRAVENOUS at 00:15

## 2018-07-30 RX ADMIN — ENOXAPARIN SODIUM 40 MILLIGRAM(S): 100 INJECTION SUBCUTANEOUS at 11:36

## 2018-07-30 RX ADMIN — MORPHINE SULFATE 1 MILLIGRAM(S): 50 CAPSULE, EXTENDED RELEASE ORAL at 11:32

## 2018-07-30 RX ADMIN — Medication 100 MILLIEQUIVALENT(S): at 11:22

## 2018-07-30 RX ADMIN — AMPICILLIN SODIUM AND SULBACTAM SODIUM 100 GRAM(S): 250; 125 INJECTION, POWDER, FOR SUSPENSION INTRAMUSCULAR; INTRAVENOUS at 11:36

## 2018-07-30 RX ADMIN — Medication 650 MILLIGRAM(S): at 21:25

## 2018-07-30 RX ADMIN — MORPHINE SULFATE 1 MILLIGRAM(S): 50 CAPSULE, EXTENDED RELEASE ORAL at 11:47

## 2018-07-30 RX ADMIN — NYSTATIN CREAM 1 APPLICATION(S): 100000 CREAM TOPICAL at 17:42

## 2018-07-30 RX ADMIN — NYSTATIN CREAM 1 APPLICATION(S): 100000 CREAM TOPICAL at 06:32

## 2018-07-30 RX ADMIN — AMPICILLIN SODIUM AND SULBACTAM SODIUM 100 GRAM(S): 250; 125 INJECTION, POWDER, FOR SUSPENSION INTRAMUSCULAR; INTRAVENOUS at 06:32

## 2018-07-30 NOTE — PROGRESS NOTE ADULT - SUBJECTIVE AND OBJECTIVE BOX
Medical attending note.     Patient is a 82y old  Female who presents with a chief complaint of hypotension and fever (25 Jul 2018 21:56)      INTERVAL HPI/OVERNIGHT EVENTS: Pt was seen and examined at bedside, alert , awake, nonverbal, bedbound, no acute overnight events. Daughter and aid were present.     MEDICATIONS  (STANDING):  ampicillin/sulbactam  IVPB      ampicillin/sulbactam  IVPB 1.5 Gram(s) IV Intermittent every 6 hours  dextrose 5% + sodium chloride 0.9%. 1000 milliLiter(s) (100 mL/Hr) IV Continuous <Continuous>  enoxaparin Injectable 40 milliGRAM(s) SubCutaneous daily  risperiDONE   Tablet 1 milliGRAM(s) Oral daily    MEDICATIONS  (PRN):  acetaminophen    Suspension 650 milliGRAM(s) Oral every 6 hours PRN For Temp greater than 38 C (100.4 F)  morphine   Solution 10 milliGRAM(s) Oral every 6 hours PRN Moderate Pain (4 - 6)  morphine  - Injectable 1 milliGRAM(s) IV Push every 6 hours PRN Severe Pain (7 - 10)  temazepam 30 milliGRAM(s) Oral at bedtime PRN Insomnia      Allergies    No Known Allergies      REVIEW OF SYSTEMS:  unable to obtain     Vital Signs Last 24 Hrs  T(C): 36.6 (29 Jul 2018 13:55), Max: 37 (29 Jul 2018 05:35)  T(F): 97.9 (29 Jul 2018 13:55), Max: 98.6 (29 Jul 2018 05:35)  HR: 78 (29 Jul 2018 13:55) (68 - 87)  BP: 129/63 (29 Jul 2018 13:55) (117/52 - 130/98)  BP(mean): --  RR: 17 (29 Jul 2018 13:55) (17 - 17)  SpO2: 97% (29 Jul 2018 13:55) (95% - 97%)  PHYSICAL EXAM:  GENERAL: non verbal   HEENT: Normocephalic;  conjunctivae and sclerae clear; moist mucous membranes;   NECK : supple  CHEST/LUNG: Clear to auscultation bilaterally with good air entry   HEART: S1 S2  regular; no murmurs, gallops or rubs  ABDOMEN: Soft, Nontender, Nondistended; Bowel sounds present  EXTREMITIES: no cyanosis; no edema; no calf tenderness  SKIN: warm and dry; no rash  Genitalia, vulvar induration involves about 1/3 of the tissue that it did on admission.   NERVOUS SYSTEM alert, but not responsive.                             9.9    9.9   )-----------( 343      ( 29 Jul 2018 07:26 )             32.1   07-29    140  |  108  |  5<L>  ----------------------------<  124<H>  3.5   |  27  |  0.44<L>    Ca    7.6<L>      29 Jul 2018 07:26  Phos  2.3     07-29  Mg     2.0     07-29        Consultant(s) Notes Reviewed:  [ x] YES  [ ] NO

## 2018-07-30 NOTE — PROGRESS NOTE ADULT - SUBJECTIVE AND OBJECTIVE BOX
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 Patient examined at bedside, weakened, unresponsive. Occasionally moans  Aid at bedside, giving liquids via syringe     T(F): 99.1 (07-30-18 @ 05:45), Max: 99.1 (07-30-18 @ 05:45)  HR: 100 (07-30-18 @ 05:45) (64 - 100)  BP: 91/71 (07-30-18 @ 05:45) (91/71 - 129/63)  RR: 18 (07-30-18 @ 05:45) (17 - 18)  SpO2: 99% (07-30-18 @ 05:45) (97% - 99%)  Wt(kg): --      07-29 @ 07:01  -  07-30 @ 07:00  --------------------------------------------------------  IN:    Oral Fluid: 200 mL  Total IN: 200 mL    OUT:    Voided: 2 mL  Total OUT: 2 mL    Total NET: 198 mL                          11.1   11.5  )-----------( 383      ( 30 Jul 2018 07:48 )             34.7   07-30    140  |  108  |  7   ----------------------------<  145<H>  3.4<L>   |  24  |  0.49<L>    Ca    8.0<L>      30 Jul 2018 07:48  Phos  2.6     07-30  Mg     2.0     07-29              Physical Exam  General: Ox0 No acute distress  Skin: No jaundice, no icterus  Abdomen: soft, nontender, nondistended, no rebound tenderness, no guarding, no palpable masses  :  feces noted

## 2018-07-30 NOTE — PROGRESS NOTE ADULT - PROBLEM SELECTOR PLAN 1
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

## 2018-07-30 NOTE — PROGRESS NOTE ADULT - SUBJECTIVE AND OBJECTIVE BOX
INTERVAL HPI/OVERNIGHT EVENTS:  Pt stable.   flatus/BM.    Vital Signs Last 24 Hrs  T(C): 37.3 (30 Jul 2018 05:45), Max: 37.3 (30 Jul 2018 05:45)  T(F): 99.1 (30 Jul 2018 05:45), Max: 99.1 (30 Jul 2018 05:45)  HR: 100 (30 Jul 2018 05:45) (64 - 100)  BP: 91/71 (30 Jul 2018 05:45) (91/71 - 129/63)  BP(mean): --  RR: 18 (30 Jul 2018 05:45) (17 - 18)  SpO2: 99% (30 Jul 2018 05:45) (97% - 99%)    Physical:  Abdomen: Soft nondistended, nontender.    I&O's Summary    29 Jul 2018 07:01  -  30 Jul 2018 07:00  --------------------------------------------------------  IN: 200 mL / OUT: 2 mL / NET: 198 mL        LABS:                        11.1   11.5  )-----------( 383      ( 30 Jul 2018 07:48 )             34.7             07-30    140  |  108  |  7   ----------------------------<  145<H>  3.4<L>   |  24  |  0.49<L>    Ca    8.0<L>      30 Jul 2018 07:48  Phos  2.6     07-30  Mg     2.0     07-29

## 2018-07-31 LAB
ANION GAP SERPL CALC-SCNC: 7 MMOL/L — SIGNIFICANT CHANGE UP (ref 5–17)
APTT BLD: 38.6 SEC — HIGH (ref 27.5–37.4)
BUN SERPL-MCNC: 7 MG/DL — SIGNIFICANT CHANGE UP (ref 7–18)
CALCIUM SERPL-MCNC: 7.8 MG/DL — LOW (ref 8.4–10.5)
CHLORIDE SERPL-SCNC: 107 MMOL/L — SIGNIFICANT CHANGE UP (ref 96–108)
CO2 SERPL-SCNC: 25 MMOL/L — SIGNIFICANT CHANGE UP (ref 22–31)
CREAT SERPL-MCNC: 0.44 MG/DL — LOW (ref 0.5–1.3)
CULTURE RESULTS: SIGNIFICANT CHANGE UP
CULTURE RESULTS: SIGNIFICANT CHANGE UP
GLUCOSE SERPL-MCNC: 103 MG/DL — HIGH (ref 70–99)
HCT VFR BLD CALC: 33 % — LOW (ref 34.5–45)
HGB BLD-MCNC: 10.3 G/DL — LOW (ref 11.5–15.5)
INR BLD: 1.21 RATIO — HIGH (ref 0.88–1.16)
MCHC RBC-ENTMCNC: 26.2 PG — LOW (ref 27–34)
MCHC RBC-ENTMCNC: 31.2 GM/DL — LOW (ref 32–36)
MCV RBC AUTO: 83.9 FL — SIGNIFICANT CHANGE UP (ref 80–100)
PLATELET # BLD AUTO: 390 K/UL — SIGNIFICANT CHANGE UP (ref 150–400)
POTASSIUM SERPL-MCNC: 3.6 MMOL/L — SIGNIFICANT CHANGE UP (ref 3.5–5.3)
POTASSIUM SERPL-SCNC: 3.6 MMOL/L — SIGNIFICANT CHANGE UP (ref 3.5–5.3)
PROTHROM AB SERPL-ACNC: 13.2 SEC — HIGH (ref 9.8–12.7)
RBC # BLD: 3.93 M/UL — SIGNIFICANT CHANGE UP (ref 3.8–5.2)
RBC # FLD: 13 % — SIGNIFICANT CHANGE UP (ref 10.3–14.5)
SODIUM SERPL-SCNC: 139 MMOL/L — SIGNIFICANT CHANGE UP (ref 135–145)
SPECIMEN SOURCE: SIGNIFICANT CHANGE UP
SPECIMEN SOURCE: SIGNIFICANT CHANGE UP
WBC # BLD: 11.7 K/UL — HIGH (ref 3.8–10.5)
WBC # FLD AUTO: 11.7 K/UL — HIGH (ref 3.8–10.5)

## 2018-07-31 PROCEDURE — 99232 SBSQ HOSP IP/OBS MODERATE 35: CPT

## 2018-07-31 PROCEDURE — 44320 COLOSTOMY: CPT | Mod: AS

## 2018-07-31 RX ORDER — SODIUM CHLORIDE 9 MG/ML
1000 INJECTION, SOLUTION INTRAVENOUS
Qty: 0 | Refills: 0 | Status: DISCONTINUED | OUTPATIENT
Start: 2018-07-31 | End: 2018-07-31

## 2018-07-31 RX ORDER — MORPHINE SULFATE 50 MG/1
2 CAPSULE, EXTENDED RELEASE ORAL ONCE
Qty: 0 | Refills: 0 | Status: DISCONTINUED | OUTPATIENT
Start: 2018-07-31 | End: 2018-07-31

## 2018-07-31 RX ADMIN — MORPHINE SULFATE 1 MILLIGRAM(S): 50 CAPSULE, EXTENDED RELEASE ORAL at 00:21

## 2018-07-31 RX ADMIN — MORPHINE SULFATE 1 MILLIGRAM(S): 50 CAPSULE, EXTENDED RELEASE ORAL at 21:29

## 2018-07-31 RX ADMIN — MORPHINE SULFATE 1 MILLIGRAM(S): 50 CAPSULE, EXTENDED RELEASE ORAL at 03:44

## 2018-07-31 RX ADMIN — TEMAZEPAM 30 MILLIGRAM(S): 15 CAPSULE ORAL at 03:43

## 2018-07-31 RX ADMIN — AMPICILLIN SODIUM AND SULBACTAM SODIUM 100 GRAM(S): 250; 125 INJECTION, POWDER, FOR SUSPENSION INTRAMUSCULAR; INTRAVENOUS at 08:10

## 2018-07-31 RX ADMIN — SODIUM CHLORIDE 100 MILLILITER(S): 9 INJECTION, SOLUTION INTRAVENOUS at 21:23

## 2018-07-31 RX ADMIN — AMPICILLIN SODIUM AND SULBACTAM SODIUM 100 GRAM(S): 250; 125 INJECTION, POWDER, FOR SUSPENSION INTRAMUSCULAR; INTRAVENOUS at 00:29

## 2018-07-31 RX ADMIN — NYSTATIN CREAM 1 APPLICATION(S): 100000 CREAM TOPICAL at 17:18

## 2018-07-31 RX ADMIN — NYSTATIN CREAM 1 APPLICATION(S): 100000 CREAM TOPICAL at 06:58

## 2018-07-31 RX ADMIN — MORPHINE SULFATE 1 MILLIGRAM(S): 50 CAPSULE, EXTENDED RELEASE ORAL at 23:19

## 2018-07-31 RX ADMIN — AMPICILLIN SODIUM AND SULBACTAM SODIUM 100 GRAM(S): 250; 125 INJECTION, POWDER, FOR SUSPENSION INTRAMUSCULAR; INTRAVENOUS at 17:18

## 2018-07-31 NOTE — PROGRESS NOTE ADULT - SUBJECTIVE AND OBJECTIVE BOX
MEDICAL ATTENDING NOTE  Patient is a 82y old  Female who presents with a chief complaint of hypotension and fever (25 Jul 2018 21:56)      HPI:  82 F from home, lives with son, has HHA, nonverbal, bedbound on home hospice, DNR/DNI, with PMH Dementia brought in by daughter in law Carine Younger [phone] for hypotension and fever x 5 days associated w/ vaginal discharge white mucus characteristic. Pt is having 5 movements via the known rectovaginal fistula, small, and non watery - family stopped giving Lactulose and Ativan past few days. Spoke w/ daughter in law confirming DNR/DNI and no central line or pressors - family is currently considering a surgical intervention for the rectovaginal fistula once the patient is more stable. Otherwise family denies any recent travel, sick contacts, vomiting, SOB, or any other complaints. Pt recently discharged in June 2018 for sepsis 2/2 stercoral colitis - completed 7 days of Rocephin and Flagyl - Gynecology consulted regarding methylene blue test to be given as enema to r/o rectovaginal fistula but Pt had rectal edema and inflammation so risk of perforation was deemed too high and family decided not to pursue surgery. (25 Jul 2018 21:56)      INTERVAL HPI/OVERNIGHT EVENTS: no new complaints    MEDICATIONS  (STANDING):  ampicillin/sulbactam  IVPB      ampicillin/sulbactam  IVPB 1.5 Gram(s) IV Intermittent every 6 hours  dextrose 5% + sodium chloride 0.9%. 1000 milliLiter(s) (100 mL/Hr) IV Continuous <Continuous>  enoxaparin Injectable 40 milliGRAM(s) SubCutaneous daily  nystatin Cream 1 Application(s) Topical two times a day  risperiDONE   Tablet 1 milliGRAM(s) Oral daily    MEDICATIONS  (PRN):  acetaminophen    Suspension 650 milliGRAM(s) Oral every 6 hours PRN For Temp greater than 38 C (100.4 F)  morphine   Solution 10 milliGRAM(s) Oral every 6 hours PRN Moderate Pain (4 - 6)  morphine  - Injectable 1 milliGRAM(s) IV Push every 6 hours PRN Severe Pain (7 - 10)  temazepam 30 milliGRAM(s) Oral at bedtime PRN Insomnia      __________________________________________________  REVIEW OF SYSTEMS:    Unable to obtain      Vital Signs Last 24 Hrs  T(C): 36.9 (31 Jul 2018 05:35), Max: 37.4 (31 Jul 2018 00:13)  T(F): 98.5 (31 Jul 2018 05:35), Max: 99.4 (31 Jul 2018 00:13)  HR: 77 (31 Jul 2018 05:35) (77 - 104)  BP: 129/41 (31 Jul 2018 05:35) (128/93 - 157/86)  BP(mean): --  RR: 18 (31 Jul 2018 05:35) (17 - 18)  SpO2: 96% (31 Jul 2018 05:35) (95% - 98%)    ________________________________________________  PHYSICAL EXAM:  GENERAL: Sleeping, in NAD  HEENT: Normocephalic;  conjunctivae and sclerae clear; moist mucous membranes;   NECK : supple  CHEST/LUNG: Clear to auscultation bilaterally with good air entry   HEART: S1 S2  regular; no murmurs, gallops or rubs  ABDOMEN: Soft, Nontender, Nondistended; Bowel sounds present  EXTREMITIES: no cyanosis; no edema; no calf tenderness  External genitalia, not examined today.   SKIN: warm and dry; no rash  NERVOUS SYSTEM:  Asleep, calm; no new deficits    _________________________________________________  LABS:                        10.3   11.7  )-----------( 390      ( 31 Jul 2018 10:32 )             33.0     07-31    139  |  107  |  7   ----------------------------<  103<H>  3.6   |  25  |  0.44<L>    Ca    7.8<L>      31 Jul 2018 08:29  Phos  2.6     07-30      PT/INR - ( 31 Jul 2018 08:29 )   PT: 13.2 sec;   INR: 1.21 ratio         PTT - ( 31 Jul 2018 08:29 )  PTT:38.6 sec    CAPILLARY BLOOD GLUCOSE

## 2018-07-31 NOTE — PROGRESS NOTE ADULT - SUBJECTIVE AND OBJECTIVE BOX
81 y/o Female nonverbal with advanced dementia seen and examined at bedside s/p diverting loop colostomy for colovaginal fistula.     Patient currently sleeping, in no acute distress. Spoke to patient's daughter who stated that patient has been sleeping well since surgery. Not yet voiding but is incontinent. Not yet producing stool in colostomy bag. No nausea, no vomiting, not in pain.        T(F): 99 (07-31-18 @ 14:45), Max: 99.4 (07-31-18 @ 00:13)  HR: 65 (07-31-18 @ 14:45) (65 - 84)  BP: 127/55 (07-31-18 @ 14:45) (127/55 - 157/86)  RR: 21 (07-31-18 @ 14:45) (14 - 23)  SpO2: 98% (07-31-18 @ 14:45) (95% - 100%)        Physical Exam  General: AAOx0, non-verbal,  No acute distress, sleeping  Skin: No jaundice, no icterus  Abdomen: soft, nontender, nondistended, no rebound tenderness, no guarding, no palpable masses, positive bowel sounds. Colostomy appreciated without stool. Mucosa is pink in color. Small amount of blood noted in colostomy bag.

## 2018-07-31 NOTE — PROGRESS NOTE ADULT - PROBLEM SELECTOR PLAN 1
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     start unasyn 1.5 gms iv q6hrs on 07/27  surgery consulted Dr Lake  ID

## 2018-07-31 NOTE — BRIEF OPERATIVE NOTE - PROCEDURE
<<-----Click on this checkbox to enter Procedure Creation of diverting loop colostomy  07/31/2018    Active  [id number]

## 2018-08-01 PROCEDURE — 99232 SBSQ HOSP IP/OBS MODERATE 35: CPT

## 2018-08-01 RX ORDER — MORPHINE SULFATE 50 MG/1
2 CAPSULE, EXTENDED RELEASE ORAL EVERY 6 HOURS
Qty: 0 | Refills: 0 | Status: DISCONTINUED | OUTPATIENT
Start: 2018-08-01 | End: 2018-08-02

## 2018-08-01 RX ORDER — ACETAMINOPHEN 500 MG
1000 TABLET ORAL ONCE
Qty: 0 | Refills: 0 | Status: COMPLETED | OUTPATIENT
Start: 2018-08-01 | End: 2018-08-01

## 2018-08-01 RX ADMIN — RISPERIDONE 1 MILLIGRAM(S): 4 TABLET ORAL at 10:30

## 2018-08-01 RX ADMIN — MORPHINE SULFATE 1 MILLIGRAM(S): 50 CAPSULE, EXTENDED RELEASE ORAL at 10:26

## 2018-08-01 RX ADMIN — AMPICILLIN SODIUM AND SULBACTAM SODIUM 100 GRAM(S): 250; 125 INJECTION, POWDER, FOR SUSPENSION INTRAMUSCULAR; INTRAVENOUS at 23:07

## 2018-08-01 RX ADMIN — NYSTATIN CREAM 1 APPLICATION(S): 100000 CREAM TOPICAL at 18:14

## 2018-08-01 RX ADMIN — Medication 1000 MILLIGRAM(S): at 15:39

## 2018-08-01 RX ADMIN — MORPHINE SULFATE 2 MILLIGRAM(S): 50 CAPSULE, EXTENDED RELEASE ORAL at 23:00

## 2018-08-01 RX ADMIN — ENOXAPARIN SODIUM 40 MILLIGRAM(S): 100 INJECTION SUBCUTANEOUS at 14:10

## 2018-08-01 RX ADMIN — AMPICILLIN SODIUM AND SULBACTAM SODIUM 100 GRAM(S): 250; 125 INJECTION, POWDER, FOR SUSPENSION INTRAMUSCULAR; INTRAVENOUS at 14:09

## 2018-08-01 RX ADMIN — MORPHINE SULFATE 2 MILLIGRAM(S): 50 CAPSULE, EXTENDED RELEASE ORAL at 22:15

## 2018-08-01 RX ADMIN — Medication 400 MILLIGRAM(S): at 15:24

## 2018-08-01 RX ADMIN — TEMAZEPAM 30 MILLIGRAM(S): 15 CAPSULE ORAL at 23:05

## 2018-08-01 RX ADMIN — MORPHINE SULFATE 1 MILLIGRAM(S): 50 CAPSULE, EXTENDED RELEASE ORAL at 04:30

## 2018-08-01 RX ADMIN — NYSTATIN CREAM 1 APPLICATION(S): 100000 CREAM TOPICAL at 06:39

## 2018-08-01 RX ADMIN — AMPICILLIN SODIUM AND SULBACTAM SODIUM 100 GRAM(S): 250; 125 INJECTION, POWDER, FOR SUSPENSION INTRAMUSCULAR; INTRAVENOUS at 18:11

## 2018-08-01 RX ADMIN — AMPICILLIN SODIUM AND SULBACTAM SODIUM 100 GRAM(S): 250; 125 INJECTION, POWDER, FOR SUSPENSION INTRAMUSCULAR; INTRAVENOUS at 00:49

## 2018-08-01 RX ADMIN — AMPICILLIN SODIUM AND SULBACTAM SODIUM 100 GRAM(S): 250; 125 INJECTION, POWDER, FOR SUSPENSION INTRAMUSCULAR; INTRAVENOUS at 06:39

## 2018-08-01 RX ADMIN — MORPHINE SULFATE 1 MILLIGRAM(S): 50 CAPSULE, EXTENDED RELEASE ORAL at 10:41

## 2018-08-01 RX ADMIN — MORPHINE SULFATE 1 MILLIGRAM(S): 50 CAPSULE, EXTENDED RELEASE ORAL at 03:56

## 2018-08-01 NOTE — ADVANCED PRACTICE NURSE CONSULT - ASSESSMENT
This is a 82yr old female patient admitted for Acute Vulvitis, to which a consult was done for Ostomy teaching. The patient has a pink stoma with scant amount of liquid stool in the ostomy bag and intact peristomal skin. An ostomy education could not be done at this time due to the fact that the patient is not oriented and the aide was detached from the process. The patients stomal size is 4inch two piece ostomy. The entire bag was removed, clean the peristomal skin, prep the peristomal skin, attach the wafer to the ostomy bag, and apply the ostomy bag to the stomal site. An informational packet was given to the bedside aide for the patients family. I will continue to monitor.

## 2018-08-01 NOTE — PROGRESS NOTE ADULT - SUBJECTIVE AND OBJECTIVE BOX
MEDICAL ATTENDING NOTE  Patient is a 82y old  Female who presents with a chief complaint of hypotension and fever (25 Jul 2018 21:56)      HPI:  82 F from home, lives with son, has HHA, nonverbal, bedbound on home hospice, DNR/DNI, with PMH Dementia brought in by daughter in law Carine Younger [phone] for hypotension and fever x 5 days associated w/ vaginal discharge white mucus characteristic. Pt is having 5 movements via the known rectovaginal fistula, small, and non watery - family stopped giving Lactulose and Ativan past few days. Spoke w/ daughter in law confirming DNR/DNI and no central line or pressors - family is currently considering a surgical intervention for the rectovaginal fistula once the patient is more stable. Otherwise family denies any recent travel, sick contacts, vomiting, SOB, or any other complaints. Pt recently discharged in June 2018 for sepsis 2/2 stercoral colitis - completed 7 days of Rocephin and Flagyl - Gynecology consulted regarding methylene blue test to be given as enema to r/o rectovaginal fistula but Pt had rectal edema and inflammation so risk of perforation was deemed too high and family decided not to pursue surgery. (25 Jul 2018 21:56)      INTERVAL HPI/OVERNIGHT EVENTS: POD # 1 diverting colostomy as treatment for colo-vaginal fistula.    MEDICATIONS  (STANDING):  ampicillin/sulbactam  IVPB      ampicillin/sulbactam  IVPB 1.5 Gram(s) IV Intermittent every 6 hours  dextrose 5% + sodium chloride 0.9%. 1000 milliLiter(s) (100 mL/Hr) IV Continuous <Continuous>  enoxaparin Injectable 40 milliGRAM(s) SubCutaneous daily  nystatin Cream 1 Application(s) Topical two times a day  risperiDONE   Tablet 1 milliGRAM(s) Oral daily    MEDICATIONS  (PRN):  acetaminophen    Suspension 650 milliGRAM(s) Oral every 6 hours PRN For Temp greater than 38 C (100.4 F)  morphine   Solution 10 milliGRAM(s) Oral every 6 hours PRN Moderate Pain (4 - 6)  morphine  - Injectable 1 milliGRAM(s) IV Push every 6 hours PRN Severe Pain (7 - 10)  temazepam 30 milliGRAM(s) Oral at bedtime PRN Insomnia      __________________________________________________  REVIEW OF SYSTEMS:    Unable to obtain      Vital Signs Last 24 Hrs  T(C): 36.4 (01 Aug 2018 14:22), Max: 37.1 (01 Aug 2018 00:23)  T(F): 97.6 (01 Aug 2018 14:22), Max: 98.8 (01 Aug 2018 00:23)  HR: 67 (01 Aug 2018 14:22) (67 - 88)  BP: 135/55 (01 Aug 2018 14:22) (121/87 - 135/55)  BP(mean): --  RR: 18 (01 Aug 2018 14:22) (18 - 20)  SpO2: 93% (01 Aug 2018 14:22) (93% - 97%)    ________________________________________________  PHYSICAL EXAM:  GENERAL: Sleeping, in NAD  HEENT: Normocephalic;  conjunctivae and sclerae clear; moist mucous membranes;   NECK : supple  CHEST/LUNG: Clear to auscultation bilaterally with good air entry   HEART: S1 S2  regular; no murmurs, gallops or rubs  ABDOMEN: Soft, Nontender, Nondistended; Bowel sounds present  EXTREMITIES: no cyanosis; no edema; no calf tenderness  External genitalia, not examined today.   SKIN: warm and dry; no rash  NERVOUS SYSTEM:  Asleep, calm; no new deficits    _________________________________________________  LABS:                        10.3   11.7  )-----------( 390      ( 31 Jul 2018 10:32 )             33.0     07-31    139  |  107  |  7   ----------------------------<  103<H>  3.6   |  25  |  0.44<L>    Ca    7.8<L>      31 Jul 2018 08:29  Phos  2.6     07-30      PT/INR - ( 31 Jul 2018 08:29 )   PT: 13.2 sec;   INR: 1.21 ratio         PTT - ( 31 Jul 2018 08:29 )  PTT:38.6 sec    CAPILLARY BLOOD GLUCOSE

## 2018-08-01 NOTE — PROGRESS NOTE ADULT - PROBLEM SELECTOR PLAN 1
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

## 2018-08-01 NOTE — PROGRESS NOTE ADULT - SUBJECTIVE AND OBJECTIVE BOX
INTERVAL HPI/OVERNIGHT EVENTS:  Pt stable.   No flatus/BM.    Vital Signs Last 24 Hrs  T(C): 36.4 (01 Aug 2018 14:22), Max: 37.1 (31 Jul 2018 21:33)  T(F): 97.6 (01 Aug 2018 14:22), Max: 98.8 (01 Aug 2018 00:23)  HR: 67 (01 Aug 2018 14:22) (67 - 103)  BP: 135/55 (01 Aug 2018 14:22) (121/87 - 135/55)  BP(mean): --  RR: 18 (01 Aug 2018 14:22) (18 - 20)  SpO2: 93% (01 Aug 2018 14:22) (93% - 98%)    Physical:  Abdomen: Soft nondistended, nontender.  Colostomy viable, no output    I&O's Summary    31 Jul 2018 07:01  -  01 Aug 2018 07:00  --------------------------------------------------------  IN: 30 mL / OUT: 5 mL / NET: 25 mL        LABS:                        10.3   11.7  )-----------( 390      ( 31 Jul 2018 10:32 )             33.0             07-31    139  |  107  |  7   ----------------------------<  103<H>  3.6   |  25  |  0.44<L>    Ca    7.8<L>      31 Jul 2018 08:29

## 2018-08-01 NOTE — PROGRESS NOTE ADULT - SUBJECTIVE AND OBJECTIVE BOX
81 y/o female s/p loop colostomy POD # 1. Patient examined at bedside, aid at bedside, patient non verbal. Moaning       T(F): 97.8 (08-01-18 @ 05:35), Max: 99.4 (07-31-18 @ 13:28)  HR: 74 (08-01-18 @ 05:35) (65 - 103)  BP: 126/66 (08-01-18 @ 05:35) (124/62 - 145/61)  RR: 20 (08-01-18 @ 05:35) (14 - 23)  SpO2: 95% (08-01-18 @ 05:35) (95% - 100%)  Wt(kg): --      07-31 @ 07:01  -  08-01 @ 07:00  --------------------------------------------------------  IN:    lactated ringers.: 30 mL  Total IN: 30 mL    OUT:    Colostomy: 5 mL  Total OUT: 5 mL    Total NET: 25 mL                          10.3   11.7  )-----------( 390      ( 31 Jul 2018 10:32 )             33.0   07-31    139  |  107  |  7   ----------------------------<  103<H>  3.6   |  25  |  0.44<L>    Ca    7.8<L>      31 Jul 2018 08:29        Physical Exam  General: No acute distress  Skin: No jaundice, no icterus  Abdomen: soft, nondistended, + loop colostomy pink viable, no air or stool noted in bag   : Normal external genitalia  Extremities: non edematous, no calf pain bilaterally

## 2018-08-02 VITALS
SYSTOLIC BLOOD PRESSURE: 118 MMHG | DIASTOLIC BLOOD PRESSURE: 58 MMHG | TEMPERATURE: 98 F | RESPIRATION RATE: 18 BRPM | OXYGEN SATURATION: 98 % | HEART RATE: 76 BPM

## 2018-08-02 PROCEDURE — 82378 CARCINOEMBRYONIC ANTIGEN: CPT

## 2018-08-02 PROCEDURE — 93005 ELECTROCARDIOGRAM TRACING: CPT

## 2018-08-02 PROCEDURE — 82607 VITAMIN B-12: CPT

## 2018-08-02 PROCEDURE — 80048 BASIC METABOLIC PNL TOTAL CA: CPT

## 2018-08-02 PROCEDURE — 80053 COMPREHEN METABOLIC PANEL: CPT

## 2018-08-02 PROCEDURE — 87086 URINE CULTURE/COLONY COUNT: CPT

## 2018-08-02 PROCEDURE — 81001 URINALYSIS AUTO W/SCOPE: CPT

## 2018-08-02 PROCEDURE — 86901 BLOOD TYPING SEROLOGIC RH(D): CPT

## 2018-08-02 PROCEDURE — 83036 HEMOGLOBIN GLYCOSYLATED A1C: CPT

## 2018-08-02 PROCEDURE — 83605 ASSAY OF LACTIC ACID: CPT

## 2018-08-02 PROCEDURE — 99291 CRITICAL CARE FIRST HOUR: CPT | Mod: 25

## 2018-08-02 PROCEDURE — 86850 RBC ANTIBODY SCREEN: CPT

## 2018-08-02 PROCEDURE — 84484 ASSAY OF TROPONIN QUANT: CPT

## 2018-08-02 PROCEDURE — 84443 ASSAY THYROID STIM HORMONE: CPT

## 2018-08-02 PROCEDURE — 84100 ASSAY OF PHOSPHORUS: CPT

## 2018-08-02 PROCEDURE — 99238 HOSP IP/OBS DSCHRG MGMT 30/<: CPT

## 2018-08-02 PROCEDURE — 80061 LIPID PANEL: CPT

## 2018-08-02 PROCEDURE — 85730 THROMBOPLASTIN TIME PARTIAL: CPT

## 2018-08-02 PROCEDURE — 82962 GLUCOSE BLOOD TEST: CPT

## 2018-08-02 PROCEDURE — 83735 ASSAY OF MAGNESIUM: CPT

## 2018-08-02 PROCEDURE — 86900 BLOOD TYPING SEROLOGIC ABO: CPT

## 2018-08-02 PROCEDURE — 85610 PROTHROMBIN TIME: CPT

## 2018-08-02 PROCEDURE — 74176 CT ABD & PELVIS W/O CONTRAST: CPT

## 2018-08-02 PROCEDURE — 87040 BLOOD CULTURE FOR BACTERIA: CPT

## 2018-08-02 PROCEDURE — 85027 COMPLETE CBC AUTOMATED: CPT

## 2018-08-02 RX ORDER — ACETAMINOPHEN 500 MG
1000 TABLET ORAL ONCE
Qty: 0 | Refills: 0 | Status: COMPLETED | OUTPATIENT
Start: 2018-08-02 | End: 2018-08-02

## 2018-08-02 RX ADMIN — ENOXAPARIN SODIUM 40 MILLIGRAM(S): 100 INJECTION SUBCUTANEOUS at 11:22

## 2018-08-02 RX ADMIN — AMPICILLIN SODIUM AND SULBACTAM SODIUM 100 GRAM(S): 250; 125 INJECTION, POWDER, FOR SUSPENSION INTRAMUSCULAR; INTRAVENOUS at 06:03

## 2018-08-02 RX ADMIN — MORPHINE SULFATE 2 MILLIGRAM(S): 50 CAPSULE, EXTENDED RELEASE ORAL at 14:11

## 2018-08-02 RX ADMIN — Medication 1000 MILLIGRAM(S): at 11:34

## 2018-08-02 RX ADMIN — MORPHINE SULFATE 2 MILLIGRAM(S): 50 CAPSULE, EXTENDED RELEASE ORAL at 14:26

## 2018-08-02 RX ADMIN — MORPHINE SULFATE 2 MILLIGRAM(S): 50 CAPSULE, EXTENDED RELEASE ORAL at 08:11

## 2018-08-02 RX ADMIN — RISPERIDONE 1 MILLIGRAM(S): 4 TABLET ORAL at 08:17

## 2018-08-02 RX ADMIN — MORPHINE SULFATE 2 MILLIGRAM(S): 50 CAPSULE, EXTENDED RELEASE ORAL at 08:26

## 2018-08-02 RX ADMIN — Medication 400 MILLIGRAM(S): at 11:19

## 2018-08-02 RX ADMIN — NYSTATIN CREAM 1 APPLICATION(S): 100000 CREAM TOPICAL at 06:03

## 2018-08-02 RX ADMIN — AMPICILLIN SODIUM AND SULBACTAM SODIUM 100 GRAM(S): 250; 125 INJECTION, POWDER, FOR SUSPENSION INTRAMUSCULAR; INTRAVENOUS at 11:21

## 2018-08-02 NOTE — PROGRESS NOTE ADULT - PROVIDER SPECIALTY LIST ADULT
Internal Medicine
Surgery
Internal Medicine

## 2018-08-02 NOTE — PROGRESS NOTE ADULT - PROBLEM SELECTOR PLAN 3
C/w Temazepam nightly w/ Risperdal;   - Holding home laxatives Lactulose, Docusate, and Senna
IMPROVE VTE Individual Risk Assessment    RISK                                                          Points  [] Previous VTE                                           3  [] Thrombophilia                                        2  [] Lower limb paralysis                              2   [] Current Cancer                                       2   [x] Immobilization > 24 hrs                        1  [] ICU/CCU stay > 24 hours                       1  [x] Age > 60                                                   1    IMPROVE VTE Score: 2, DVT PPx w/ Lovenox
IMPROVE VTE Individual Risk Assessment    RISK                                                          Points  [] Previous VTE                                           3  [] Thrombophilia                                        2  [] Lower limb paralysis                              2   [] Current Cancer                                       2   [x] Immobilization > 24 hrs                        1  [] ICU/CCU stay > 24 hours                       1  [x] Age > 60                                                   1    IMPROVE VTE Score: 2, DVT PPx w/ Lovenox

## 2018-08-02 NOTE — DISCHARGE NOTE ADULT - MEDICATION SUMMARY - MEDICATIONS TO TAKE
I will START or STAY ON the medications listed below when I get home from the hospital:    morphine 15 mg oral tablet  -- 1 tab(s) by mouth every 6 hours  -- Indication: For Prn pain     acetaminophen 160 mg/5 mL oral suspension  -- 20.31 milliliter(s) by mouth every 6 hours, As needed, Moderate Pain (4 - 6)  -- Indication: For Pain     Ativan 1 mg oral tablet  -- 1 tab(s) by mouth every 6 hours, As Needed  -- Indication: For Anxiety     RisperDAL 1 mg oral tablet  -- 1 tab(s) by mouth once a day  -- Indication: For Dementia with behavioral disturbance, unspecified dementia type    temazepam 30 mg oral capsule  -- 1 cap(s) by mouth once a day (at bedtime)  -- Indication: For Dementia with behavioral disturbance, unspecified dementia type    magnesium citrate 1.745 g/30 mL oral liquid  -- 300 milliliter(s) by mouth once a week on monday  -- Indication: For Home med     MiraLax oral powder for reconstitution  -- 1 packet(s) by mouth once a day  -- Indication: For Home med     Senna 8.8 mg/5 mL oral syrup  -- 10 milliliter(s) by mouth once a day (at bedtime)  -- Indication: For Home med     docusate sodium 10 mg/mL oral liquid  -- 10 milliliter(s) by mouth 2 times a day  -- Indication: For Home med     lactulose 10 g/15 mL oral syrup  -- 15 milliliter(s) by mouth once a day  -- Indication: For Home med I will START or STAY ON the medications listed below when I get home from the hospital:    morphine 15 mg oral tablet  -- 1 tab(s) by mouth every 6 hours  -- Indication: For Prn pain     acetaminophen 160 mg/5 mL oral suspension  -- 20.31 milliliter(s) by mouth every 6 hours, As needed, Moderate Pain (4 - 6)  -- Indication: For Pain     Ativan 1 mg oral tablet  -- 1 tab(s) by mouth every 6 hours, As Needed  -- Indication: For Anxiety     RisperDAL 1 mg oral tablet  -- 1 tab(s) by mouth once a day  -- Indication: For Dementia with behavioral disturbance, unspecified dementia type    temazepam 30 mg oral capsule  -- 1 cap(s) by mouth once a day (at bedtime)  -- Indication: For Dementia with behavioral disturbance, unspecified dementia type    magnesium citrate 1.745 g/30 mL oral liquid  -- 300 milliliter(s) by mouth once a week on monday  -- Indication: For Home med     MiraLax oral powder for reconstitution  -- 1 packet(s) by mouth once a day  -- Indication: For Home med     Senna 8.8 mg/5 mL oral syrup  -- 10 milliliter(s) by mouth once a day (at bedtime)  -- Indication: For Home med     docusate sodium 10 mg/mL oral liquid  -- 10 milliliter(s) by mouth 2 times a day  -- Indication: For Home med     lactulose 10 g/15 mL oral syrup  -- 15 milliliter(s) by mouth once a day  -- Indication: For Home med     amoxicillin-clavulanate 875 mg-125 mg oral tablet  -- 875 milligram(s) by mouth every 12 hours   -- Finish all this medication unless otherwise directed by prescriber.  Take with food or milk.    -- Indication: For Vulvar cellulitis

## 2018-08-02 NOTE — PROGRESS NOTE ADULT - NSHPATTENDINGPLANDISCUSS_GEN_ALL_CORE
PGY2; RN; Sx KYLER Stephen; Family; Dr. Lake and Dr. Orta
RN, PA, family
Daughter
Dr. Esteban, daughter in law, GYN, GI, ID.
RN, PA
RN, PA
RN, PA,

## 2018-08-02 NOTE — DISCHARGE NOTE ADULT - PLAN OF CARE
Daily ostomy care please continue daily ostomy care  to resume hospice services   diet as tolerated continue with hospice services

## 2018-08-02 NOTE — PROGRESS NOTE ADULT - PROBLEM SELECTOR PLAN 2
start unasyn 1.5 gms iv q6hrs on 07/27  ID 
Augmentin x 5 days.   ID 
C/w Temazepam nightly w/ Risperdal;   - Holding home laxatives Lactulose, Docusate, and Senna
discontinued  rocephin and flagyl  start unasyn 1.5 gms iv q6hrs on 07/27  ID 
discontinued  rocephin and flagyl  start unasyn 1.5 gms iv q6hrs on 07/27  ID 
start unasyn 1.5 gms iv q6hrs on 07/27  ID 
start unasyn 1.5 gms iv q6hrs on 07/27  ID 
C/w Temazepam nightly w/ Risperdal;   - Holding home laxatives Lactulose, Docusate, and Senna

## 2018-08-02 NOTE — ADVANCED PRACTICE NURSE CONSULT - ASSESSMENT
This is a 82yr old female patient admitted for Acute Vulvitis, to which a consult was done for Ostomy. The patients family member was educated on the patients stomal size, how to empty the bag, how to change the entire system, how to maintain the peristomal skin, and how to spot potential complications. The patients family member returned demonstration and understanding of the teaching. I will continue to monitor

## 2018-08-02 NOTE — PROGRESS NOTE ADULT - PROBLEM SELECTOR PROBLEM 3
Dementia with behavioral disturbance, unspecified dementia type
Prophylactic measure
Prophylactic measure

## 2018-08-02 NOTE — PROGRESS NOTE ADULT - PROBLEM SELECTOR PLAN 1
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    start unasyn 1.5 gms iv q6hrs on 07/27  surgery consulted Dr Lake  ID

## 2018-08-02 NOTE — PROGRESS NOTE ADULT - SUBJECTIVE AND OBJECTIVE BOX
MEDICAL ATTENDING NOTE  Patient is a 82y old  Female who presents with a chief complaint of hypotension and fever (02 Aug 2018 11:31)      HPI:  82 F from home, lives with son, has HHA, nonverbal, bedbound on home hospice, DNR/DNI, with PMH Dementia brought in by daughter in law Carine Younger [phone] for hypotension and fever x 5 days associated w/ vaginal discharge white mucus characteristic. Pt is having 5 movements via the known rectovaginal fistula, small, and non watery - family stopped giving Lactulose and Ativan past few days. Spoke w/ daughter in law confirming DNR/DNI and no central line or pressors - family is currently considering a surgical intervention for the rectovaginal fistula once the patient is more stable. Otherwise family denies any recent travel, sick contacts, vomiting, SOB, or any other complaints. Pt recently discharged in June 2018 for sepsis 2/2 stercoral colitis - completed 7 days of Rocephin and Flagyl - Gynecology consulted regarding methylene blue test to be given as enema to r/o rectovaginal fistula but Pt had rectal edema and inflammation so risk of perforation was deemed too high and family decided not to pursue surgery. (25 Jul 2018 21:56)      INTERVAL HPI/OVERNIGHT EVENTS: no new complaints    MEDICATIONS  (STANDING):  ampicillin/sulbactam  IVPB      ampicillin/sulbactam  IVPB 1.5 Gram(s) IV Intermittent every 6 hours  dextrose 5% + sodium chloride 0.9%. 1000 milliLiter(s) (100 mL/Hr) IV Continuous <Continuous>  enoxaparin Injectable 40 milliGRAM(s) SubCutaneous daily  nystatin Cream 1 Application(s) Topical two times a day  risperiDONE   Tablet 1 milliGRAM(s) Oral daily    MEDICATIONS  (PRN):  acetaminophen    Suspension 650 milliGRAM(s) Oral every 6 hours PRN For Temp greater than 38 C (100.4 F)  morphine   Solution 10 milliGRAM(s) Oral every 6 hours PRN Moderate Pain (4 - 6)  morphine  - Injectable 2 milliGRAM(s) IV Push every 6 hours PRN Severe Pain (7 - 10)      __________________________________________________  REVIEW OF SYSTEMS:    CONSTITUTIONAL: No fever,   EYES: no acute visual disturbances  NECK: No pain or stiffness  RESPIRATORY: No cough; No shortness of breath  CARDIOVASCULAR: No chest pain, no palpitations  GASTROINTESTINAL: No pain. No nausea or vomiting; No diarrhea   NEUROLOGICAL: No headache or numbness, no tremors  MUSCULOSKELETAL: No joint pain, no muscle pain  GENITOURINARY: no dysuria, no frequency, no hesitancy  PSYCHIATRY: no depression , no anxiety  ALL OTHER  ROS negative        Vital Signs Last 24 Hrs  T(C): 36.4 (02 Aug 2018 14:22), Max: 36.4 (02 Aug 2018 05:25)  T(F): 97.5 (02 Aug 2018 14:22), Max: 97.5 (02 Aug 2018 05:25)  HR: 76 (02 Aug 2018 14:22) (62 - 76)  BP: 118/58 (02 Aug 2018 14:22) (118/58 - 161/86)  BP(mean): --  RR: 18 (02 Aug 2018 14:22) (18 - 18)  SpO2: 98% (02 Aug 2018 14:22) (95% - 98%)    ________________________________________________  PHYSICAL EXAM:  GENERAL: Awake, moaning   HEENT: Normocephalic;  conjunctivae and sclerae clear; moist mucous membranes;   NECK : supple  CHEST/LUNG: Clear to auscultation bilaterally with good air entry   HEART: S1 S2  regular; no murmurs, gallops or rubs  ABDOMEN: Soft, Nontender, Nondistended; Bowel sounds present  EXTREMITIES: no cyanosis; no edema; no calf tenderness  External genitalia, minimal erythema and swelling of labiae majorae.   SKIN: warm and dry; no rash  NERVOUS SYSTEM:  Awake, calm; no new deficits  _________________________________________________

## 2018-08-02 NOTE — PROGRESS NOTE ADULT - ATTENDING COMMENTS
Attending Medicine Covering Dr. Lennon    Patient seen and examined earlier this afternoon. Agree with PGY1 A/P above with editing as needed. Discussed with Dr. Esteban    Patient is demented, awake, non verbal HHA feeding liquid food. Spoke with Son and daughter with family friend KYLER Beasley form our Sx department;     Vital Signs Last 24 Hrs  T(C): 37 (26 Jul 2018 14:52), Max: 37.8 (25 Jul 2018 19:37)  T(F): 98.6 (26 Jul 2018 14:52), Max: 100 (25 Jul 2018 19:37)  HR: 76 (26 Jul 2018 14:52) (76 - 95)  BP: 104/57 (26 Jul 2018 14:52) (71/43 - 104/57)  RR: 16 (26 Jul 2018 14:52) (16 - 20)  SpO2: 98% (26 Jul 2018 14:52) (95% - 100%)  Lungs, clear  Cor, RRR  Abdomen, suprapubic tenderness  External genitalia, fullness of both labiae majorae with tenderness.  No stool seen.  Perianal area is negative.   Rectal, not performed.                         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  Agitation and pain are controlled.           Vulvar tenderness, possibly secondary to pressure from bladder, vs cellulitis secondary to stool contamination.           [place]-vaginal fistula, surgical option is being considered.   Surgical attending Dr. Lake evaluated and d/w family; Agreed for Palliative Diverting Colostomy scheduled for 7/31/18.   Low risk procedure as per Sx team      Plan: Antibiotics to treat likely polymicrobic cellulitis of the vulva.  ID consultation, Dr. Haley.   GI consultation for colonoscopy to identify colon lesion (Dr. Orta).; d/w GI no intervention indicated if Sx planning Diverting Colostomy; He spoke with Dr. Fox; No plan for Colonoscopy.     -Pt to benefit from diverting loop colostomy, plan for OR 7/31    Hospitalist on call to cover this weekend
Patient seen/evaluated at bedside.  Awake.  Family is present.                           10.3   11.7  )-----------( 390      ( 31 Jul 2018 10:32 )             33.0   07-31    139  |  107  |  7   ----------------------------<  103<H>  3.6   |  25  |  0.44<L>    Ca    7.8<L>      31 Jul 2018 08:29        1. vulvar cellulitis, responding to treatment.  Likely caused by stool in vagina.  2. colo-vaginal fistula, s/p diverting colostomy  3. advanced dementia w/ behavioral disturbance  4. functional quadriplegia  5. recurrent UTI  6. Hypokalemia has been supplemented; normokalemic today    -c/w unasyn  -feed w/ assistance, change positioning q 2 hours  -c/w outpt regimen of temazepam/risperdal  -dvt ppx
Patient seen/evaluated at bedside.  Asleep.  Family is present.     1. vulvar cellulitis, responding to treatment.  Likely caused by stool in vagina.  2. colo-vaginal fistula  3. advanced dementia w/ behavioral disturbance  4. functional quadriplegia  5. recurrent UTI  6. Hypokalemia has been supplemented; normokalemic today    -c/w unasyn  -feed w/ assistance, change positioning q 2 hours  -c/w outpt regimen of temazepam/risperdal  -dvt ppx    Labs, reviewed.   no medical contraindications to planned surgical intervention of diverting loop colostomy
Patient seen/evaluated at bedside.  Awake.                           10.3   11.7  )-----------( 390      ( 31 Jul 2018 10:32 )             33.0   07-31    139  |  107  |  7   ----------------------------<  103<H>  3.6   |  25  |  0.44<L>    Ca    7.8<L>      31 Jul 2018 08:29        1. vulvar cellulitis, responding to treatment.  Likely caused by stool in vagina.  Improving since diverting colostomy.   2. colo-vaginal fistula, s/p diverting colostomy  3. advanced dementia w/ behavioral disturbance  4. functional quadriplegia  5. recurrent UTI, treated  6. Hypokalemia has been supplemented; normokalemic yesterday.    Augmentin x 5 days.   -c/w outpt regimen of temazepam/risperdal
Patient seen/evaluated at bedside.  Pt awake/alert.     awake/alert, not responding  Vital Signs Last 24 Hrs  T(C): 37.3 (30 Jul 2018 13:13), Max: 37.3 (30 Jul 2018 05:45)  T(F): 99.2 (30 Jul 2018 13:13), Max: 99.2 (30 Jul 2018 13:13)  HR: 104 (30 Jul 2018 13:13) (64 - 104)  BP: 128/93 (30 Jul 2018 13:13) (91/71 - 128/93)  BP(mean): --  RR: 18 (30 Jul 2018 13:13) (18 - 18)  SpO2: 98% (30 Jul 2018 13:13) (98% - 99%)  S1S2 RRR  CTAB/l no accessory muscle use  soft, minimal suprapubic tenderness, + bs  labial tenderness, decrease in amount of induration of the vulvar tissue.  Still present.     1. vulvar cellulitis  2. colo-vaginal fistula  3. advanced dementia w/ behavioral disturbance  4. functional quadriplegia  5. recurrent UTI  6. Hypokalemia has been supplemented    -c/w unasyn  -feed w/ assistance, change positioning q 2 hours  -c/w outpt regimen of temazepam/risperdal  -dvt ppx  All labs will be repeated in AM.   no medical contraindications to planned surgical intervention on Tuesday of diverting loop colostomy
Patient seen/evaluated at bedside.  Pt awake/alert. Eating. Family at bedside including daughter.      awake/alert, responds to verbal stimuli  S1S2 RRR  CTAB/l no accessory muscle use  soft, minimal suprapubic tenderness, + bs  labial tenderness, decrease in amount of induration of the vulvar tissue.  Still present.     1. vulvar cellulitis  2. colo-vaginal fistula  3. advanced dementia w/ behavioral disturbance  4. functional quadriplegia  5. recurrent UTI    no medical contraindications to planned surgical intervention on Tuesday of diverting loop colostomy  -c/w unasyn  -feed w/ assistance, change positioning q 2 hours  -c/w outpt regimen of temazepam/risperdal  -dvt ppx
Patient was examined and discussed with Dr. Esteban.     She is alert, but non-verbal, though she vocalizes.   Vital Signs Last 24 Hrs  T(C): 37 (26 Jul 2018 14:52), Max: 37.8 (25 Jul 2018 19:37)  T(F): 98.6 (26 Jul 2018 14:52), Max: 100 (25 Jul 2018 19:37)  HR: 76 (26 Jul 2018 14:52) (76 - 95)  BP: 104/57 (26 Jul 2018 14:52) (71/43 - 104/57)  BP(mean): --  RR: 16 (26 Jul 2018 14:52) (16 - 20)  SpO2: 98% (26 Jul 2018 14:52) (95% - 100%)  Lungs, clear  Cor, RRR  Abdomen, suprapubic tenderness  External genitalia, fullness of both labiae majorae with tenderness.  No stool seen.  Perianal area is negative.   Rectal, not performed.                         9.4    11.2  )-----------( 325      ( 26 Jul 2018 08:48 )             30.2   07-26    144  |  113<H>  |  14  ----------------------------<  134<H>  3.3<L>   |  25  |  0.43<L>    Ca    7.9<L>      26 Jul 2018 08:48  Phos  2.0     07-26  Mg     2.1     07-26    TPro  6.3  /  Alb  1.7<L>  /  TBili  0.3  /  DBili  x   /  AST  10  /  ALT  12  /  AlkPhos  56  07-26    IMP:  Dementia, stable.  Agitation and pain are controlled.           Vulvar tenderness, possibly secondary to pressure from bladder, vs cellulitis secondary to stool contamination.           [place]-vaginal fistula, surgical option is being considered.   Plan: Antibiotics to treat likely polymicrobic cellulitis of the vulva.  ID consultation, Dr. Haley.           GYN consultation, Dr. White will see, to consider repair of fistula.           GI consultation for colonoscopy to identify colon lesion (Dr. Orta).
Patient seen/evaluated at bedside 7/29/18. I agree with the resident progress note/outlined plan of care. My independent findings and conclusions are documented.    Pt awake/alert. Eating. Family at bedside including daughter, reports patient appears much more comfortable this morning which she attributes to the consistent morphine administration. No fevers/chills/ nausea/vomiting. Family in agreement w/ plan for surgical intervention on Tuesday.     113/54 P71 97.7 99% on RA  awake/alert, responds to verbal stimuli  S1S2 RRR  CTAB/l no accessory muscle use  soft, minimal suprapubic tenderness, + bs  labial tenderness    1. vulvar cellulitis  2. colo-vaginal fistula  3. advanced dementia w/ behavioral disturbance  4. functional quadriplegia  5. recurrent UTI    no medical contraindications to planned surgical intervention on Tuesday of diverting loop colostomy  -c/w unasyn day 3  -feed w/ assistance, change positioning q 2 hours  -c/w outpt regimen of temazepam/risperdal  -dvt ppx

## 2018-08-02 NOTE — PROGRESS NOTE ADULT - ASSESSMENT
82 F from home, lives with son, has HHA, nonverbal, bedbound on home hospice, DNR/DNI, with PMH Dementia brought in by daughter in law Carine Younger [phone] for hypotension and fever x 5 days associated w/ vaginal discharge white mucus characteristics - admitted for concern of infectious colitis complicated w/ rectovaginal fistula.  She is now post diverting colostomy.
81 y/o F s/p diverting loop colostomy for colovaginal fistula. [place]-vaginal cellulitis       1. Continue antibiotics  2. Regular diet  3. Monitor or ostomy function  4. DVT PPx  5. Out of bed  6. If patient doesn't void then will need to be catheterized
81 y/o female with a colovaginal fistula, s/p Diverting Loop Colostomy    1. Pureed diet  2. d/c planning
82 F from home, lives with son, has HHA, nonverbal, bedbound on home hospice, DNR/DNI, with PMH Dementia brought in by daughter in law Carine Younger [phone] for hypotension and fever x 5 days associated w/ vaginal discharge white mucus characteristics - admitting for concern of infectious colitis complicated w/ rectovaginal fistula
82 F from home, lives with son, has HHA, nonverbal, bedbound on home hospice, DNR/DNI, with PMH Dementia brought in by daughter in law Carine Younger [phone] for hypotension and fever x 5 days associated w/ vaginal discharge white mucus characteristics - admitting for concern of infectious colitis complicated w/ rectovaginal fistula
82 F from home, lives with son, has HHA, nonverbal, bedbound on home hospice, DNR/DNI, with PMH Dementia brought in by daughter in law Carine Younger [phone] for hypotension and fever x 5 days associated w/ vaginal discharge white mucus characteristics - admitted for concern of infectious colitis complicated w/ rectovaginal fistula.  She is now post diverting colostomy.
82 F from home, lives with son, has HHA, nonverbal, bedbound on home hospice, DNR/DNI, with PMH Dementia brought in by daughter in law Carine Younger [phone] for hypotension and fever x 5 days associated w/ vaginal discharge white mucus characteristics - admitting for concern of infectious colitis complicated w/ rectovaginal fistula
82 F from home, lives with son, has HHA, nonverbal, bedbound on home hospice, DNR/DNI, with PMH Dementia brought in by daughter in law Carine Younger [phone] for hypotension and fever x 5 days associated w/ vaginal discharge white mucus characteristics - admitting for concern of infectious colitis complicated w/ rectovaginal fistula
82 F from home, lives with son, has HHA, nonverbal, bedbound on home hospice, DNR/DNI, with PMH Dementia brought in by daughter in law Carine Younger [phone] for hypotension and fever x 5 days associated w/ vaginal discharge white mucus characteristics - admitting for concern of infectious colitis complicated w/ rectovaginal fistula
83 y/o female non verbal, bedbound admitted with vulvar cellulitis from colovaginal fistula
83 y/o female s/p loop colostomy POD # 1.
Condition discussed with family, options risks and benefits explained  For loop colostomy in AM
Start PO diet  As per Medicine
82 F from home, lives with son, has HHA, nonverbal, bedbound on home hospice, DNR/DNI, with PMH Dementia brought in by daughter in law Carine Younger [phone] for hypotension and fever x 5 days associated w/ vaginal discharge white mucus characteristics - admitting for concern of infectious colitis complicated w/ rectovaginal fistula

## 2018-08-02 NOTE — PROGRESS NOTE ADULT - PROBLEM SELECTOR PROBLEM 1
Fistula, colovaginal
Colitis
Fistula, colovaginal
Colitis

## 2018-08-02 NOTE — DISCHARGE NOTE ADULT - CARE PLAN
Principal Discharge DX:	Fistula, colovaginal  Secondary Diagnosis:	Dementia with behavioral disturbance, unspecified dementia type Principal Discharge DX:	Fistula, colovaginal  Goal:	Daily ostomy care  Assessment and plan of treatment:	please continue daily ostomy care  to resume hospice services   diet as tolerated  Secondary Diagnosis:	Dementia with behavioral disturbance, unspecified dementia type  Goal:	continue with hospice services

## 2018-08-02 NOTE — DISCHARGE NOTE ADULT - HOSPITAL COURSE
82 F from home, lives with son, has HHA, nonverbal, bedbound on home hospice, DNR/DNI, with PMH Dementia brought in by daughter in law Carine Younger [phone] for hypotension and fever x 5 days associated w/ vaginal discharge white mucus characteristic. Pt is having 5 movements via the known rectovaginal fistula, small, and non watery - family stopped giving Lactulose and Ativan past few days. Spoke w/ daughter in law confirming DNR/DNI and no central line or pressors - family is currently considering a surgical intervention for the rectovaginal fistula once the patient is more stable. Otherwise family denies any recent travel, sick contacts, vomiting, SOB, or any other complaints. Pt recently discharged in June 2018 for sepsis 2/2 stercoral colitis - completed 7 days of Rocephin and Flagyl - Gynecology consulted regarding methylene blue test to be given as enema to r/o rectovaginal fistula but Pt had rectal edema and inflammation so risk of perforation was deemed too high and family decided not to pursue surgery.  Family decided to proceed with surgery and patient underwent loop colostomy on 7/31. Patient tolerated procedure well. ostomy functioning. for d/c home on home hospice with home care

## 2018-08-02 NOTE — DISCHARGE NOTE ADULT - PATIENT PORTAL LINK FT
You can access the [facility] Patient Portal, offered by [facility], by registering with the following website: [email or web address]

## 2018-08-02 NOTE — PROGRESS NOTE ADULT - SUBJECTIVE AND OBJECTIVE BOX
s/p Diverting Loop Colostomy for Colovaginal Fistula 7/31      Patient examined at bedside, she is nonverbal  No nausea, no vomiting  Tolerating diet  Air in ostomy, no stool        T(F): 97.5 (08-02-18 @ 05:25), Max: 97.6 (08-01-18 @ 14:22)  HR: 62 (08-02-18 @ 05:25) (62 - 73)  BP: 161/86 (08-02-18 @ 05:25) (121/87 - 161/86)  RR: 18 (08-02-18 @ 05:25) (18 - 20)  SpO2: 96% (08-02-18 @ 05:25) (93% - 96%)          Physical Exam  General: No acute distress  Skin: No jaundice, no icterus  Abdomen: soft, nontender, nondistended, no rebound tenderness, no guarding, no palpable masses, ostomy with air, mucosa pink

## 2018-08-02 NOTE — PROGRESS NOTE ADULT - PROBLEM SELECTOR PROBLEM 2
Vulvar cellulitis
Dementia with behavioral disturbance, unspecified dementia type
Vulvar cellulitis
Dementia with behavioral disturbance, unspecified dementia type

## 2018-09-01 PROCEDURE — G9001: CPT

## 2019-09-07 ENCOUNTER — EMERGENCY (EMERGENCY)
Facility: HOSPITAL | Age: 84
LOS: 1 days | Discharge: HOME | End: 2019-09-07
Attending: EMERGENCY MEDICINE
Payer: MEDICARE

## 2019-09-07 VITALS
DIASTOLIC BLOOD PRESSURE: 76 MMHG | SYSTOLIC BLOOD PRESSURE: 137 MMHG | TEMPERATURE: 98 F | RESPIRATION RATE: 20 BRPM | WEIGHT: 149.91 LBS | HEART RATE: 100 BPM | OXYGEN SATURATION: 95 % | HEIGHT: 65 IN

## 2019-09-07 LAB
ALBUMIN SERPL ELPH-MCNC: 3.4 G/DL — LOW (ref 3.5–5)
ALP SERPL-CCNC: 63 U/L — SIGNIFICANT CHANGE UP (ref 40–120)
ALT FLD-CCNC: 24 U/L DA — SIGNIFICANT CHANGE UP (ref 10–60)
ANION GAP SERPL CALC-SCNC: 5 MMOL/L — SIGNIFICANT CHANGE UP (ref 5–17)
AST SERPL-CCNC: 18 U/L — SIGNIFICANT CHANGE UP (ref 10–40)
BASOPHILS # BLD AUTO: 0.1 K/UL — SIGNIFICANT CHANGE UP (ref 0–0.2)
BASOPHILS NFR BLD AUTO: 0.9 % — SIGNIFICANT CHANGE UP (ref 0–2)
BILIRUB SERPL-MCNC: 0.3 MG/DL — SIGNIFICANT CHANGE UP (ref 0.2–1.2)
BUN SERPL-MCNC: 14 MG/DL — SIGNIFICANT CHANGE UP (ref 7–18)
CALCIUM SERPL-MCNC: 9.3 MG/DL — SIGNIFICANT CHANGE UP (ref 8.4–10.5)
CHLORIDE SERPL-SCNC: 101 MMOL/L — SIGNIFICANT CHANGE UP (ref 96–108)
CO2 SERPL-SCNC: 30 MMOL/L — SIGNIFICANT CHANGE UP (ref 22–31)
CREAT SERPL-MCNC: 0.7 MG/DL — SIGNIFICANT CHANGE UP (ref 0.5–1.3)
EOSINOPHIL # BLD AUTO: 0.56 K/UL — HIGH (ref 0–0.5)
EOSINOPHIL NFR BLD AUTO: 5.3 % — SIGNIFICANT CHANGE UP (ref 0–6)
GLUCOSE SERPL-MCNC: 124 MG/DL — HIGH (ref 70–99)
HCT VFR BLD CALC: 40.7 % — SIGNIFICANT CHANGE UP (ref 34.5–45)
HGB BLD-MCNC: 12.7 G/DL — SIGNIFICANT CHANGE UP (ref 11.5–15.5)
IMM GRANULOCYTES NFR BLD AUTO: 0.3 % — SIGNIFICANT CHANGE UP (ref 0–1.5)
LIDOCAIN IGE QN: 57 U/L — LOW (ref 73–393)
LYMPHOCYTES # BLD AUTO: 2.31 K/UL — SIGNIFICANT CHANGE UP (ref 1–3.3)
LYMPHOCYTES # BLD AUTO: 21.7 % — SIGNIFICANT CHANGE UP (ref 13–44)
MCHC RBC-ENTMCNC: 27 PG — SIGNIFICANT CHANGE UP (ref 27–34)
MCHC RBC-ENTMCNC: 31.2 GM/DL — LOW (ref 32–36)
MCV RBC AUTO: 86.4 FL — SIGNIFICANT CHANGE UP (ref 80–100)
MONOCYTES # BLD AUTO: 0.81 K/UL — SIGNIFICANT CHANGE UP (ref 0–0.9)
MONOCYTES NFR BLD AUTO: 7.6 % — SIGNIFICANT CHANGE UP (ref 2–14)
NEUTROPHILS # BLD AUTO: 6.82 K/UL — SIGNIFICANT CHANGE UP (ref 1.8–7.4)
NEUTROPHILS NFR BLD AUTO: 64.2 % — SIGNIFICANT CHANGE UP (ref 43–77)
NRBC # BLD: 0 /100 WBCS — SIGNIFICANT CHANGE UP (ref 0–0)
PLATELET # BLD AUTO: 409 K/UL — HIGH (ref 150–400)
POTASSIUM SERPL-MCNC: 4.1 MMOL/L — SIGNIFICANT CHANGE UP (ref 3.5–5.3)
POTASSIUM SERPL-SCNC: 4.1 MMOL/L — SIGNIFICANT CHANGE UP (ref 3.5–5.3)
PROT SERPL-MCNC: 8.3 G/DL — SIGNIFICANT CHANGE UP (ref 6–8.3)
RBC # BLD: 4.71 M/UL — SIGNIFICANT CHANGE UP (ref 3.8–5.2)
RBC # FLD: 12.7 % — SIGNIFICANT CHANGE UP (ref 10.3–14.5)
SODIUM SERPL-SCNC: 136 MMOL/L — SIGNIFICANT CHANGE UP (ref 135–145)
WBC # BLD: 10.63 K/UL — HIGH (ref 3.8–10.5)
WBC # FLD AUTO: 10.63 K/UL — HIGH (ref 3.8–10.5)

## 2019-09-07 PROCEDURE — 99285 EMERGENCY DEPT VISIT HI MDM: CPT

## 2019-09-07 RX ORDER — MORPHINE SULFATE 50 MG/1
2 CAPSULE, EXTENDED RELEASE ORAL ONCE
Refills: 0 | Status: DISCONTINUED | OUTPATIENT
Start: 2019-09-07 | End: 2019-09-07

## 2019-09-07 RX ADMIN — MORPHINE SULFATE 2 MILLIGRAM(S): 50 CAPSULE, EXTENDED RELEASE ORAL at 20:09

## 2019-09-07 NOTE — ED ADULT TRIAGE NOTE - CHIEF COMPLAINT QUOTE
BIBA for possible abd pain, pt is hospice care w/ severe dementia and is on cipro and flagyl as per family, family states pt is moaning and groaning and appears to have swollen stoma

## 2019-09-08 VITALS
OXYGEN SATURATION: 96 % | RESPIRATION RATE: 18 BRPM | SYSTOLIC BLOOD PRESSURE: 153 MMHG | HEART RATE: 94 BPM | TEMPERATURE: 98 F | DIASTOLIC BLOOD PRESSURE: 62 MMHG

## 2019-09-08 PROCEDURE — 74177 CT ABD & PELVIS W/CONTRAST: CPT

## 2019-09-08 PROCEDURE — 36415 COLL VENOUS BLD VENIPUNCTURE: CPT

## 2019-09-08 PROCEDURE — 83690 ASSAY OF LIPASE: CPT

## 2019-09-08 PROCEDURE — 80053 COMPREHEN METABOLIC PANEL: CPT

## 2019-09-08 PROCEDURE — 74177 CT ABD & PELVIS W/CONTRAST: CPT | Mod: 26,GV

## 2019-09-08 PROCEDURE — 96374 THER/PROPH/DIAG INJ IV PUSH: CPT | Mod: XU

## 2019-09-08 PROCEDURE — 99284 EMERGENCY DEPT VISIT MOD MDM: CPT | Mod: 25

## 2019-09-08 PROCEDURE — 85027 COMPLETE CBC AUTOMATED: CPT

## 2019-09-08 RX ADMIN — MORPHINE SULFATE 2 MILLIGRAM(S): 50 CAPSULE, EXTENDED RELEASE ORAL at 01:58

## 2019-09-08 NOTE — ED PROVIDER NOTE - OBJECTIVE STATEMENT
Patient is 82 y/o F with a significant PMHx of dementia and with no significant PSHx presents to the ED for worsening abdominal pian. Patient is a home hospice patient. Patient was diagnosed with infection a week ago and redness around stoma. Patient was given Cipro and then her fever improved. Patient reports recurring abdominal pain but no fever. Patient denies any vomiting or any other complaints. NKDA. Patient is 82 y/o F with a significant PMHx of dementia and with no significant PSHx presents to the ED for worsening abdominal pian. History provided by daughter since patient is unable to provide reliable history due to dementia. Patient is a home hospice patient. Patient was diagnosed with infection a week ago and redness around stoma. Patient was given Cipro and then her fever improved. Patient reports recurring abdominal pain but no fever. Patient denies any vomiting or any other complaints. NKDA.

## 2019-09-08 NOTE — ED PROVIDER NOTE - PHYSICAL EXAMINATION
General: pt lying in stretcher, appears stated age and is not in distress  HEENT: AT/NC, pink conjunctiva, anicteric sclerae, EOMI, PERRLA, TMs smooth, grey, intact, with normal landmarks, nasal mucosa pink, no discharge, turbinates not enlarged; moist mucus membranes, tongue well-papillated, good dentition; posterior pharynx shows no erythema or exudates;   Neck: supple, full ROM, trachea midline, no JVD, no cervical LAD, no midline ttp or stepoffs  Lungs: symmetric excursion, b/l clear vesicular breath sounds with no wheezes, crackles, or rhonchi  Heart: rrr, S1, S2 normal; no S3 or S4; no murmurs or rubs  Abd: normal bowel sounds; soft, nontender; negative McBurney's point tenderness, negative Sparrow's sign, no rebound, no guarding, spleen non-palpable; no hepatomegaly, no masses  Back: no midline spinal tenderness or stepoffs, no costovertebral angle tenderness  Extremities: no clubbing, cyanosis, or edema; no palpable deformities or fractures  Skin: good turgor; no rashes, petechiae, ecchymoses, or jaundice  Pulses: radial, posterior tibialis (PT), dorsalis pedis (DP) all 2+ & symmetric  Neuro: awake, alert, responsive; oriented to person, place and time; cranial nerves intact, EOMI, intact jaw movement, intact facial sensation, no facial asymmetry, hearing intact; no nystagmus, tongue midline; Motor: Normal tone in upper and lower extremities bilaterally strength 5/5; Sensory: intact to pinprick and light touch; Cerebellar: finger-to-nose intact; normal steady gait; negative Romberg’s sign; DTR: biceps, triceps, patellar, 2+, no pronator drift General: pt lying in stretcher, appears stated age and is not in distress  HEENT: AT/NC, pink conjunctiva, anicteric sclerae, EOMI, PERRLA, moist mucus membranes, tongue well-papillated  Neck: supple, full ROM, trachea midline, no JVD, no cervical LAD, no midline ttp or stepoffs  Lungs: symmetric excursion, b/l clear vesicular breath sounds with no wheezes, crackles, or rhonchi  Heart: rrr, S1, S2 normal; no S3 or S4; no murmurs or rubs  Abd: normal bowel sounds; soft, colostomy bag w/ liquid stool and surrounding tenderness; no rebound, no guarding, spleen non-palpable; no hepatomegaly, no masses  Back: no midline spinal tenderness or stepoffs, no costovertebral angle tenderness  Extremities: no palpable deformities or fractures  Skin: see abd  Pulses: radial, posterior tibialis (PT), dorsalis pedis (DP) all 2+ & symmetric  Neuro: awake, alert, cranial nerves grossly intact, EOMI, intact jaw movement, intact facial sensation, no facial asymmetry, Motor: Normal tone in upper and lower extremities bilaterally Sensory: intact

## 2019-09-08 NOTE — ED PROVIDER NOTE - PATIENT PORTAL LINK FT
You can access the FollowMyHealth Patient Portal offered by [facility] by registering at the following website: [email or web address]. By joining [facility]’s FollowMyHealth portal, you will also be able to view your health information using other applications (apps) compatible with our system.

## 2019-09-08 NOTE — ED PROVIDER NOTE - CLINICAL SUMMARY MEDICAL DECISION MAKING FREE TEXT BOX
Pt w/ aforementioned presentation concerning for but not limited to infection versus obstruction versus complication w/ colostomy. Will get labs, give pain meds, get CT a/p, monitor and reassess.     Pt signed out to Dr. Adames pending CT a/p results.

## 2020-03-01 NOTE — ED ADULT NURSE NOTE - CAS EDN DISCHARGE ASSESSMENT
Patient requesting to be disconnected from PCA and IV fluids to go walk outside   Alert and oriented to person, place and time

## 2020-07-03 NOTE — ED ADULT NURSE NOTE - CHIEF COMPLAINT
Called patient to switch visit to a telephone visit with photos to be reviewed first before coming into the clinic for further treatment. No answer, left voicemail and call back number.  
The patient is a 83y Female complaining of abdominal pain.

## 2020-07-06 NOTE — DISCHARGE NOTE ADULT - NURSING SECTION COMPLETE
Nano Pichardo received a viral test for COVID-19. They were educated on isolation and quarantine as appropriate. For any symptoms, they were directed to seek care from their PCP, given contact information to establish with a doctor, directed to an urgent care or the emergency room. Patient/Caregiver provided printed discharge information.

## 2021-01-19 NOTE — DIETITIAN INITIAL EVALUATION ADULT. - DIET TYPE
Rounded on pt, resting in bed with parent at bedside.  IVF infusing at this time.    
dysphagia 1, pureed, honey consistency fluid

## 2022-05-27 NOTE — PATIENT PROFILE ADULT. - AS SC BRADEN SENSORY
Routing refill request to provider for review/approval because:  Drug not on the [facility] refill protocol     Yohan Sanchez RN  [facility] Home Triage Nurse    (3) slightly limited

## 2022-07-08 NOTE — ED ADULT NURSE NOTE - TEMPERATURE IN FAHRENHEIT (DEGREES F)
LVMTCB and reschedule her 8/8/22 apointment with Dr. Pito Zuñiga Dr. out of office this day, called 7/8/22
98

## 2022-08-02 NOTE — PATIENT PROFILE ADULT. - SUPPORT PERSON PHONE
Attending and PA/NP shared services statement (NON-critical care): Attending and PA/NP shared services statement (NON-critical care): [phone]

## 2023-01-10 NOTE — ED ADULT NURSE NOTE - RELATIONSHIP TO PATIENT
DAUGHTER IN LAW Drysol Counseling:  I discussed with the patient the risks of drysol/aluminum chloride including but not limited to skin rash, itching, irritation, burning.

## 2023-02-15 NOTE — DIETITIAN INITIAL EVALUATION ADULT. - 25 CAL
8799 Azelaic Acid Counseling: Patient counseled that medicine may cause skin irritation and to avoid applying near the eyes.  In the event of skin irritation, the patient was advised to reduce the amount of the drug applied or use it less frequently.   The patient verbalized understanding of the proper use and possible adverse effects of azelaic acid.  All of the patient's questions and concerns were addressed.

## 2023-06-15 NOTE — ED PROVIDER NOTE - CHIEF COMPLAINT
I have reviewed the note and assessment with the Resident and I agree with the documentation and plan of care.    Patient called again and is in pain. She did call into work today   The patient is a 83y Female complaining of abdominal pain.

## 2023-10-19 NOTE — DISCHARGE NOTE ADULT - CLICK TO LAUNCH ORM
Lifestyle Recommendations:  Eat 10 servings of combined of vegetables and fruits each day (more veggies than fruits!)  Eat 3 balanced meals per day and 2 snacks per day.  Eat at least 20-25 grams of protein per meal.  Avoid or minimize refined carbs (anything made with sugar, white flour, white rice, white potatoes, regular pasta).  Be active! Make an effort to do one movement based activity each day. I recommend a minimum of 150 minutes of moderate intensity physical activity each week. This could be walking, yoga, running, biking, hiking, jumping rope - and the list goes on!   Wear sunscreen (SPF 15-30 or greater) on sun exposed areas year-round. It is also very important to get 20 minutes of sun exposure every day. This stimulates your body's production of vitamin D.   Get 25-30 grams of fiber daily.  Aim for seven to eight hours of sleep nightly.  Manage stress: engage in one activity daily to reduce your level of stress - this can be anything that helps you relax! Yoga, walking, taking a bath, reading etc.  Always wear a seatbelt and motorcycle helmet.  Have regular dental exam twice a year and eye exam once a year.  Do regular self skin exam and look for suspicious moles based on ABCDE of skin cancer.  A- for Asymmetry  B- for Border - irregular, jagged, poorly defined  C- for Color  D- for Diameter - 6 mm or more  E- for Evolving - change in size, color, bleeding, healing poorly or itching for no apparent reason     Vitamins and Supplements:  You should be getting 1200 mg of calcium each day.  One serving of calcium (yogurt, 2 ounces of cheese, glass of milk) has about 250-300 mg of calcium.   Other sources that are high in calcium are dark green leafy vegetables, bone broth, and sardines.  Ideal source of calcium is dietary, but if needed a supplement of calcium must be taken together with the vitamin D.  You should take a multivitamin daily.  You should take an omega-3 supplement daily (fish or krill oil,  flax/hemp/susan seeds).      For Your Information      If you are in need of a medication refill please use one of the following options:  1. Call your pharmacy if there are refills remaining.  2. [email or web address]- [email or web address]  3. Call your providers office  4. Use the Medication Refill Hotline: [phone]) [phone]     If your provider ordered testing today, you will be notified of your test results within 3-5 business days unless specified otherwise. If you have not received your results within 5 business days please call your provider's office.    I do know that your time is valuable and I respect your time and your trust. I very much appreciate your continued trust in me and in my team to care for you and your health needs. If you have any needs that should arise before our next visit together please contact my office at any time to set up a visit.  Please aim to arrive 15 minutes before your scheduled appointment time so I am able to spend the entirety of your visit discussing your concerns.   .

## 2024-05-06 NOTE — ED ADULT NURSE REASSESSMENT NOTE - NS ED NURSE REASSESS COMMENT FT1
Pt reassessed, observed laying in bed, breathing room air, in no respiratory distress at this time. All meds administered as ordered. Skin is intact, B/L forearm 18Ga intact. Pt assisted with care as needed, had no bowel movement, minute crumbs of stools noted on diaper, C-diff lab cancelled. Pt straight cath for urine. Admitted to [facility], report given to JAQUI Fry by JAQUI Landaverde (ED RN). Transported to floor on stretcher accompanied by family members.
How Severe Is Your Skin Lesion?: moderate
Has Your Skin Lesion Been Treated?: not been treated
Is This A New Presentation, Or A Follow-Up?: Skin Lesion

## 2024-11-08 NOTE — H&P ADULT. - BACK EXAM
Jacoby Cabrera had an appointment with us today 11/8/2024. Please excuse her mother from work today as they had to accompany the patient to their appointment.        Thank you,         Jamey Galindo M.D.  Electronically Signed   no tenderness appreciated/normal shape

## 2025-06-09 NOTE — ED ADULT NURSE NOTE - NS ED NURSE LEVEL OF CONSCIOUSNESS AFFECT
We will start patient on Abilify 5 mg daily for treatment of her depressive symptoms.  Continue with Wellbutrin  mg daily, Lexapro 20 mg daily.  Continue with Lamictal 200 mg daily.  Orders:    ARIPiprazole (ABILIFY) 5 mg tablet; Take 1 tablet (5 mg total) by mouth daily    
Anxious/Agitated